# Patient Record
Sex: MALE | Race: BLACK OR AFRICAN AMERICAN | Employment: FULL TIME | URBAN - METROPOLITAN AREA
[De-identification: names, ages, dates, MRNs, and addresses within clinical notes are randomized per-mention and may not be internally consistent; named-entity substitution may affect disease eponyms.]

---

## 2017-01-12 ENCOUNTER — OFFICE VISIT (OUTPATIENT)
Dept: DERMATOLOGY | Age: 52
End: 2017-01-12

## 2017-01-12 DIAGNOSIS — L20.84 INTRINSIC ATOPIC DERMATITIS: Primary | ICD-10-CM

## 2017-01-12 PROCEDURE — 99202 OFFICE O/P NEW SF 15 MIN: CPT | Performed by: DERMATOLOGY

## 2017-01-12 RX ORDER — BETAMETHASONE DIPROPIONATE 0.05 %
OINTMENT (GRAM) TOPICAL
Qty: 45 G | Refills: 2 | Status: SHIPPED | OUTPATIENT
Start: 2017-01-12 | End: 2018-02-23 | Stop reason: SDUPTHER

## 2017-03-02 ENCOUNTER — TELEPHONE (OUTPATIENT)
Dept: CARDIOLOGY CLINIC | Age: 52
End: 2017-03-02

## 2017-08-03 DIAGNOSIS — I10 ESSENTIAL HYPERTENSION: ICD-10-CM

## 2017-08-04 RX ORDER — LOSARTAN POTASSIUM 100 MG/1
100 TABLET ORAL DAILY
Qty: 90 TABLET | Refills: 0 | Status: SHIPPED | OUTPATIENT
Start: 2017-08-04 | End: 2017-08-25 | Stop reason: SDUPTHER

## 2017-08-23 DIAGNOSIS — E79.0 ELEVATED URIC ACID IN BLOOD: ICD-10-CM

## 2017-08-23 RX ORDER — ALLOPURINOL 100 MG/1
TABLET ORAL
Qty: 90 TABLET | Refills: 2 | Status: SHIPPED | OUTPATIENT
Start: 2017-08-23 | End: 2018-09-26 | Stop reason: SDUPTHER

## 2017-08-23 RX ORDER — ASPIRIN 81 MG/1
TABLET, CHEWABLE ORAL
Qty: 90 TABLET | Refills: 3 | Status: SHIPPED | OUTPATIENT
Start: 2017-08-23 | End: 2019-01-25

## 2017-08-25 ENCOUNTER — OFFICE VISIT (OUTPATIENT)
Dept: FAMILY MEDICINE CLINIC | Age: 52
End: 2017-08-25

## 2017-08-25 VITALS
BODY MASS INDEX: 39.99 KG/M2 | OXYGEN SATURATION: 97 % | HEART RATE: 66 BPM | WEIGHT: 270 LBS | SYSTOLIC BLOOD PRESSURE: 120 MMHG | DIASTOLIC BLOOD PRESSURE: 84 MMHG | TEMPERATURE: 96.1 F | HEIGHT: 69 IN | RESPIRATION RATE: 16 BRPM

## 2017-08-25 DIAGNOSIS — Z76.0 ENCOUNTER FOR MEDICATION REFILL: ICD-10-CM

## 2017-08-25 DIAGNOSIS — E78.2 MIXED HYPERLIPIDEMIA: ICD-10-CM

## 2017-08-25 DIAGNOSIS — I10 ESSENTIAL HYPERTENSION: Primary | ICD-10-CM

## 2017-08-25 PROCEDURE — 99213 OFFICE O/P EST LOW 20 MIN: CPT | Performed by: FAMILY MEDICINE

## 2017-08-25 RX ORDER — ASPIRIN 325 MG
325 TABLET ORAL DAILY
COMMUNITY
End: 2017-08-25 | Stop reason: ALTCHOICE

## 2017-08-25 RX ORDER — ATORVASTATIN CALCIUM 10 MG/1
10 TABLET, FILM COATED ORAL DAILY
Qty: 90 TABLET | Refills: 3 | Status: SHIPPED | OUTPATIENT
Start: 2017-08-25 | End: 2018-09-27 | Stop reason: SDUPTHER

## 2017-08-25 RX ORDER — LOSARTAN POTASSIUM 100 MG/1
100 TABLET ORAL DAILY
Qty: 90 TABLET | Refills: 3 | Status: SHIPPED | OUTPATIENT
Start: 2017-08-25 | End: 2018-09-26 | Stop reason: SDUPTHER

## 2017-08-25 RX ORDER — PANTOPRAZOLE SODIUM 40 MG/1
40 TABLET, DELAYED RELEASE ORAL
Qty: 90 TABLET | Refills: 1 | Status: SHIPPED | OUTPATIENT
Start: 2017-08-25 | End: 2018-06-25 | Stop reason: SDUPTHER

## 2018-02-23 ENCOUNTER — OFFICE VISIT (OUTPATIENT)
Dept: FAMILY MEDICINE CLINIC | Age: 53
End: 2018-02-23

## 2018-02-23 VITALS
SYSTOLIC BLOOD PRESSURE: 122 MMHG | HEIGHT: 69 IN | DIASTOLIC BLOOD PRESSURE: 82 MMHG | WEIGHT: 285.4 LBS | BODY MASS INDEX: 42.27 KG/M2 | OXYGEN SATURATION: 97 % | HEART RATE: 66 BPM

## 2018-02-23 DIAGNOSIS — R10.32 LEFT LOWER QUADRANT ABDOMINAL PAIN OF UNKNOWN ETIOLOGY: Primary | ICD-10-CM

## 2018-02-23 DIAGNOSIS — I10 ESSENTIAL HYPERTENSION: Chronic | ICD-10-CM

## 2018-02-23 LAB
BILIRUBIN, POC: NORMAL
BLOOD URINE, POC: NORMAL
CLARITY, POC: NORMAL
COLOR, POC: YELLOW
GLUCOSE URINE, POC: NORMAL
KETONES, POC: NORMAL
LEUKOCYTE EST, POC: NORMAL
NITRITE, POC: NORMAL
PH, POC: 5.5
PROTEIN, POC: NORMAL
SPECIFIC GRAVITY, POC: 1.03
UROBILINOGEN, POC: 0.2

## 2018-02-23 PROCEDURE — G8427 DOCREV CUR MEDS BY ELIG CLIN: HCPCS | Performed by: FAMILY MEDICINE

## 2018-02-23 PROCEDURE — 1036F TOBACCO NON-USER: CPT | Performed by: FAMILY MEDICINE

## 2018-02-23 PROCEDURE — 3017F COLORECTAL CA SCREEN DOC REV: CPT | Performed by: FAMILY MEDICINE

## 2018-02-23 PROCEDURE — G8417 CALC BMI ABV UP PARAM F/U: HCPCS | Performed by: FAMILY MEDICINE

## 2018-02-23 PROCEDURE — G8484 FLU IMMUNIZE NO ADMIN: HCPCS | Performed by: FAMILY MEDICINE

## 2018-02-23 PROCEDURE — 99213 OFFICE O/P EST LOW 20 MIN: CPT | Performed by: FAMILY MEDICINE

## 2018-02-23 PROCEDURE — 81002 URINALYSIS NONAUTO W/O SCOPE: CPT | Performed by: FAMILY MEDICINE

## 2018-02-23 RX ORDER — BETAMETHASONE DIPROPIONATE 0.05 %
OINTMENT (GRAM) TOPICAL
Qty: 45 G | Refills: 2 | Status: SHIPPED | OUTPATIENT
Start: 2018-02-23 | End: 2018-12-14 | Stop reason: SDUPTHER

## 2018-02-23 ASSESSMENT — PATIENT HEALTH QUESTIONNAIRE - PHQ9
SUM OF ALL RESPONSES TO PHQ9 QUESTIONS 1 & 2: 0
SUM OF ALL RESPONSES TO PHQ QUESTIONS 1-9: 0
2. FEELING DOWN, DEPRESSED OR HOPELESS: 0
1. LITTLE INTEREST OR PLEASURE IN DOING THINGS: 0

## 2018-02-23 NOTE — PROGRESS NOTES
Rodrigo Pagan is a 47 y.o. male. HPI:  LLQ abd discomfort since wed, better today , seems resolved  No fever, no nausea or vomiting or diarrhea, further abdominal pain  Colonoscopy in 2015 showed left sided mild diverticulosis  Diverticulosis and diverticulitis discussed with patient  Taking meds  Pressure well controlled  Weight is up he is aware and going to work on it      [id number] Readings from Last 3 Encounters:   02/23/18 285 lb 6.4 oz (129.5 kg)   08/25/17 270 lb (122.5 kg)   11/21/16 275 lb (124.7 kg)     Meds, vitamins and allergies reviewed with Patient    ROS:  Gen:  No fever  HEENT:  No cold symptoms, sore throat. CV:  Denies chest pain or palpitations. Pulm:  Denies shortness of breath, cough. Abd:  Denies abdominal pain, nausea and vomiting today  Skin: no rash    Allergies   Allergen Reactions    Aleve [Naproxen Sodium] Rash    Cherry Swelling    Norvasc [Amlodipine Besylate] Other (See Comments)     Palpitations/chest pressure       Prior to Visit Medications    Medication Sig Taking? Authorizing Provider   pantoprazole (PROTONIX) 40 MG tablet Take 1 tablet by mouth every morning (before breakfast) Yes Ivon Burns MD   losartan (COZAAR) 100 MG tablet Take 1 tablet by mouth daily Yes Ivon Burns MD   atorvastatin (LIPITOR) 10 MG tablet Take 1 tablet by mouth daily Yes Ivon Burns MD   allopurinol (ZYLOPRIM) 100 MG tablet TAKE ONE TABLET BY MOUTH DAILY Yes Ivon Burns MD   aspirin 81 MG chewable tablet CHEW ONE TABLET BY MOUTH DAILY Yes Ivon Burns MD   triamcinolone (KENALOG) 0.1 % ointment Apply to affected areas on the chin twice daily for up to 2 weeks or until improved. Yes Flex Ty MD   betamethasone dipropionate (DIPROLENE) 0.05 % ointment Apply to affected areas on the arms, chest and thighs twice daily for up to 2 weeks or until improved.  Yes Flex Ty MD   sildenafil (VIAGRA) 50 MG tablet Take 50 mg by mouth as needed  Yes Historical Provider, MD

## 2018-04-03 ENCOUNTER — TELEPHONE (OUTPATIENT)
Dept: FAMILY MEDICINE CLINIC | Age: 53
End: 2018-04-03

## 2018-06-25 RX ORDER — PANTOPRAZOLE SODIUM 40 MG/1
40 TABLET, DELAYED RELEASE ORAL
Qty: 90 TABLET | Refills: 1 | Status: SHIPPED | OUTPATIENT
Start: 2018-06-25 | End: 2019-01-25 | Stop reason: SDUPTHER

## 2018-07-23 ENCOUNTER — OFFICE VISIT (OUTPATIENT)
Dept: FAMILY MEDICINE CLINIC | Age: 53
End: 2018-07-23

## 2018-07-23 VITALS
RESPIRATION RATE: 16 BRPM | BODY MASS INDEX: 39.55 KG/M2 | HEART RATE: 72 BPM | WEIGHT: 267 LBS | HEIGHT: 69 IN | OXYGEN SATURATION: 98 % | SYSTOLIC BLOOD PRESSURE: 130 MMHG | DIASTOLIC BLOOD PRESSURE: 80 MMHG

## 2018-07-23 DIAGNOSIS — I10 ESSENTIAL HYPERTENSION: ICD-10-CM

## 2018-07-23 DIAGNOSIS — M17.0 PRIMARY OSTEOARTHRITIS OF BOTH KNEES: Primary | ICD-10-CM

## 2018-07-23 PROCEDURE — 3017F COLORECTAL CA SCREEN DOC REV: CPT | Performed by: FAMILY MEDICINE

## 2018-07-23 PROCEDURE — 1036F TOBACCO NON-USER: CPT | Performed by: FAMILY MEDICINE

## 2018-07-23 PROCEDURE — G8427 DOCREV CUR MEDS BY ELIG CLIN: HCPCS | Performed by: FAMILY MEDICINE

## 2018-07-23 PROCEDURE — 99213 OFFICE O/P EST LOW 20 MIN: CPT | Performed by: FAMILY MEDICINE

## 2018-07-23 PROCEDURE — G8417 CALC BMI ABV UP PARAM F/U: HCPCS | Performed by: FAMILY MEDICINE

## 2018-07-23 NOTE — PROGRESS NOTES
Shama Oconnell is a 47 y.o. male. HPI:  At work Wednesday last week 7/18/18 - hose let loose and was sprayed in face by a chemical /cancer med ? Went to [facility] - flushed eye /decontamninated   Has eye appt today at   Mild HA today  Asking for renewal of his disability placard/letter  Working on weight loss    Wt Readings from Last 3 Encounters:   07/23/18 267 lb (121.1 kg)   02/23/18 285 lb 6.4 oz (129.5 kg)   08/25/17 270 lb (122.5 kg)     Meds, vitamins and allergies reviewed with Patient    ROS:  Gen:  No fever  HEENT:  No cold symptoms, sore throat. CV:  Denies chest pain or palpitations. Pulm:  Denies shortness of breath, cough. Abd:  Denies abdominal pain, nausea and vomiting. Skin: no rash    Allergies   Allergen Reactions    Aleve [Naproxen Sodium] Rash    Cherry Swelling    Norvasc [Amlodipine Besylate] Other (See Comments)     Palpitations/chest pressure       Prior to Visit Medications    Medication Sig Taking? Authorizing Provider   pantoprazole (PROTONIX) 40 MG tablet Take 1 tablet by mouth every morning (before breakfast) Yes Kentrell Cazares MD   triamcinolone (KENALOG) 0.1 % ointment Apply to affected areas on the chin twice daily for up to 2 weeks or until improved. Yes Kentrell Cazares MD   betamethasone dipropionate (DIPROLENE) 0.05 % ointment Apply to affected areas on the arms, chest and thighs twice daily for up to 2 weeks or until improved.  Yes Kentrell Cazares MD   aspirin 81 MG chewable tablet CHEW ONE TABLET BY MOUTH DAILY Yes Kentrell Cazares MD   losartan (COZAAR) 100 MG tablet Take 1 tablet by mouth daily Yes Kentrell Cazares MD   atorvastatin (LIPITOR) 10 MG tablet Take 1 tablet by mouth daily Yes Kentrell Cazares MD   allopurinol (ZYLOPRIM) 100 MG tablet TAKE ONE TABLET BY MOUTH DAILY Yes Kentrell Cazares MD   aspirin 81 MG chewable tablet CHEW ONE TABLET BY MOUTH DAILY Yes Kentrell Cazares MD   sildenafil (VIAGRA) 50 MG tablet Take 50 mg by mouth as needed  Yes Historical Provider, MD

## 2018-08-03 ENCOUNTER — TELEPHONE (OUTPATIENT)
Dept: FAMILY MEDICINE CLINIC | Age: 53
End: 2018-08-03

## 2018-08-30 ENCOUNTER — HOSPITAL ENCOUNTER (OUTPATIENT)
Dept: ULTRASOUND IMAGING | Age: 53
Discharge: HOME | End: 2018-08-30
Attending: INTERNAL MEDICINE | Admitting: INTERNAL MEDICINE

## 2018-08-30 DIAGNOSIS — N18.30 CHRONIC KIDNEY DISEASE, STAGE III (MODERATE) (HCC): ICD-10-CM

## 2018-08-30 DIAGNOSIS — N18.30 CKD (CHRONIC KIDNEY DISEASE), STAGE III (HCC): ICD-10-CM

## 2018-08-30 DIAGNOSIS — I10 ESSENTIAL HYPERTENSION: ICD-10-CM

## 2018-08-30 LAB
ANION GAP SERPL CALCULATED.3IONS-SCNC: 12 MMOL/L (ref 3–16)
BUN BLDV-MCNC: 12 MG/DL (ref 7–20)
CALCIUM SERPL-MCNC: 8.6 MG/DL (ref 8.3–10.6)
CHLORIDE BLD-SCNC: 103 MMOL/L (ref 99–110)
CO2: 28 MMOL/L (ref 21–32)
CREAT SERPL-MCNC: 1.7 MG/DL (ref 0.9–1.3)
CREATININE URINE: 288.3 MG/DL (ref 39–259)
GFR AFRICAN AMERICAN: 51
GFR NON-AFRICAN AMERICAN: 43
GLUCOSE BLD-MCNC: 82 MG/DL (ref 70–99)
POTASSIUM SERPL-SCNC: 4.2 MMOL/L (ref 3.5–5.1)
PROTEIN PROTEIN: 18 MG/DL
SODIUM BLD-SCNC: 143 MMOL/L (ref 136–145)

## 2018-09-26 DIAGNOSIS — I10 ESSENTIAL HYPERTENSION: ICD-10-CM

## 2018-09-26 DIAGNOSIS — E79.0 ELEVATED URIC ACID IN BLOOD: ICD-10-CM

## 2018-09-26 NOTE — TELEPHONE ENCOUNTER
Medication and Quantity requested: Atorvastatin 10 MG Qty 90     Last Visit  7/23/18    Pharmacy and phone number updated in EPIC:  yes

## 2018-09-27 RX ORDER — ATORVASTATIN CALCIUM 10 MG/1
10 TABLET, FILM COATED ORAL DAILY
Qty: 90 TABLET | Refills: 3 | Status: SHIPPED | OUTPATIENT
Start: 2018-09-27 | End: 2020-02-21 | Stop reason: SDUPTHER

## 2018-09-28 RX ORDER — ALLOPURINOL 100 MG/1
TABLET ORAL
Qty: 90 TABLET | Refills: 2 | Status: SHIPPED | OUTPATIENT
Start: 2018-09-28 | End: 2019-01-28 | Stop reason: SDUPTHER

## 2018-09-28 RX ORDER — LOSARTAN POTASSIUM 100 MG/1
TABLET ORAL
Qty: 90 TABLET | Refills: 2 | Status: SHIPPED | OUTPATIENT
Start: 2018-09-28 | End: 2019-08-19 | Stop reason: SDUPTHER

## 2018-11-06 RX ORDER — ASPIRIN 81 MG/1
TABLET, CHEWABLE ORAL
Qty: 90 TABLET | Refills: 1 | Status: SHIPPED | OUTPATIENT
Start: 2018-11-06 | End: 2019-08-19 | Stop reason: SDUPTHER

## 2018-12-14 RX ORDER — BETAMETHASONE DIPROPIONATE 0.05 %
OINTMENT (GRAM) TOPICAL
Qty: 45 G | Refills: 2 | Status: ON HOLD | OUTPATIENT
Start: 2018-12-14 | End: 2022-03-01

## 2019-01-24 ENCOUNTER — TELEPHONE (OUTPATIENT)
Dept: FAMILY MEDICINE CLINIC | Age: 54
End: 2019-01-24

## 2019-01-24 ENCOUNTER — NURSE TRIAGE (OUTPATIENT)
Dept: OTHER | Facility: CLINIC | Age: 54
End: 2019-01-24

## 2019-01-25 ENCOUNTER — OFFICE VISIT (OUTPATIENT)
Dept: FAMILY MEDICINE CLINIC | Age: 54
End: 2019-01-25
Payer: COMMERCIAL

## 2019-01-25 VITALS
BODY MASS INDEX: 41.03 KG/M2 | DIASTOLIC BLOOD PRESSURE: 60 MMHG | HEIGHT: 69 IN | SYSTOLIC BLOOD PRESSURE: 110 MMHG | OXYGEN SATURATION: 93 % | HEART RATE: 70 BPM | TEMPERATURE: 97.5 F | WEIGHT: 277 LBS

## 2019-01-25 DIAGNOSIS — Z11.4 ENCOUNTER FOR SCREENING FOR HIV: ICD-10-CM

## 2019-01-25 DIAGNOSIS — E78.2 MIXED HYPERLIPIDEMIA: ICD-10-CM

## 2019-01-25 DIAGNOSIS — E79.0 ELEVATED URIC ACID IN BLOOD: ICD-10-CM

## 2019-01-25 DIAGNOSIS — Z23 FLU VACCINE NEED: ICD-10-CM

## 2019-01-25 DIAGNOSIS — R07.89 CHEST PAIN, ATYPICAL: Primary | Chronic | ICD-10-CM

## 2019-01-25 LAB
CHOLESTEROL, TOTAL: 157 MG/DL (ref 0–199)
HDLC SERPL-MCNC: 48 MG/DL (ref 40–60)
LDL CHOLESTEROL CALCULATED: 97 MG/DL
TRIGL SERPL-MCNC: 62 MG/DL (ref 0–150)
URIC ACID, SERUM: 7.3 MG/DL (ref 3.5–7.2)
VLDLC SERPL CALC-MCNC: 12 MG/DL

## 2019-01-25 PROCEDURE — 90471 IMMUNIZATION ADMIN: CPT | Performed by: FAMILY MEDICINE

## 2019-01-25 PROCEDURE — 90682 RIV4 VACC RECOMBINANT DNA IM: CPT | Performed by: FAMILY MEDICINE

## 2019-01-25 PROCEDURE — 99214 OFFICE O/P EST MOD 30 MIN: CPT | Performed by: FAMILY MEDICINE

## 2019-01-25 RX ORDER — PANTOPRAZOLE SODIUM 40 MG/1
40 TABLET, DELAYED RELEASE ORAL
Qty: 90 TABLET | Refills: 3 | Status: SHIPPED | OUTPATIENT
Start: 2019-01-25 | End: 2020-02-06

## 2019-01-26 LAB
HIV AG/AB: NORMAL
HIV ANTIGEN: NORMAL
HIV-1 ANTIBODY: NORMAL
HIV-2 AB: NORMAL

## 2019-01-28 DIAGNOSIS — E79.0 ELEVATED URIC ACID IN BLOOD: ICD-10-CM

## 2019-01-28 RX ORDER — ALLOPURINOL 100 MG/1
200 TABLET ORAL DAILY
Qty: 180 TABLET | Refills: 3 | Status: SHIPPED | OUTPATIENT
Start: 2019-01-28 | End: 2020-02-21 | Stop reason: SDUPTHER

## 2019-02-04 ENCOUNTER — TELEPHONE (OUTPATIENT)
Dept: FAMILY MEDICINE CLINIC | Age: 54
End: 2019-02-04

## 2019-10-31 ENCOUNTER — TELEPHONE (OUTPATIENT)
Dept: FAMILY MEDICINE CLINIC | Age: 54
End: 2019-10-31

## 2020-02-21 ENCOUNTER — HOSPITAL ENCOUNTER (OUTPATIENT)
Dept: GENERAL RADIOLOGY | Age: 55
Discharge: HOME | End: 2020-02-21
Payer: COMMERCIAL

## 2020-02-21 ENCOUNTER — OFFICE VISIT (OUTPATIENT)
Dept: FAMILY MEDICINE CLINIC | Age: 55
End: 2020-02-21
Payer: COMMERCIAL

## 2020-02-21 ENCOUNTER — HOSPITAL ENCOUNTER (OUTPATIENT)
Age: 55
Discharge: HOME | End: 2020-02-21
Payer: COMMERCIAL

## 2020-02-21 VITALS
WEIGHT: 267 LBS | SYSTOLIC BLOOD PRESSURE: 118 MMHG | BODY MASS INDEX: 40.47 KG/M2 | OXYGEN SATURATION: 98 % | DIASTOLIC BLOOD PRESSURE: 76 MMHG | HEIGHT: 68 IN | HEART RATE: 69 BPM

## 2020-02-21 DIAGNOSIS — E78.2 MIXED HYPERLIPIDEMIA: ICD-10-CM

## 2020-02-21 DIAGNOSIS — I10 ESSENTIAL HYPERTENSION: ICD-10-CM

## 2020-02-21 DIAGNOSIS — E79.0 ELEVATED URIC ACID IN BLOOD: ICD-10-CM

## 2020-02-21 LAB
A/G RATIO: 1.7 (ref 1.1–2.2)
ALBUMIN SERPL-MCNC: 4 G/DL (ref 3.4–5)
ALP BLD-CCNC: 56 U/L (ref 40–129)
ALT SERPL-CCNC: 19 U/L (ref 10–40)
ANION GAP SERPL CALCULATED.3IONS-SCNC: 12 MMOL/L (ref 3–16)
AST SERPL-CCNC: 25 U/L (ref 15–37)
BACTERIA URINE, POC: NORMAL
BASOPHILS ABSOLUTE: 0 K/UL (ref 0–0.2)
BASOPHILS RELATIVE PERCENT: 0.4 %
BILIRUB SERPL-MCNC: 1.5 MG/DL (ref 0–1)
BILIRUBIN URINE: 0 MG/DL
BLOOD, URINE: NORMAL
BUN BLDV-MCNC: 14 MG/DL (ref 7–20)
CALCIUM SERPL-MCNC: 9.3 MG/DL (ref 8.3–10.6)
CASTS URINE, POC: NORMAL
CHLORIDE BLD-SCNC: 103 MMOL/L (ref 99–110)
CHOLESTEROL, TOTAL: 160 MG/DL (ref 0–199)
CLARITY: CLEAR
CO2: 26 MMOL/L (ref 21–32)
COLOR: YELLOW
CREAT SERPL-MCNC: 1.5 MG/DL (ref 0.9–1.3)
CRYSTALS URINE, POC: NORMAL
EOSINOPHILS ABSOLUTE: 0.1 K/UL (ref 0–0.6)
EOSINOPHILS RELATIVE PERCENT: 1.5 %
EPI CELLS URINE, POC: NORMAL
GFR AFRICAN AMERICAN: 59
GFR NON-AFRICAN AMERICAN: 49
GLOBULIN: 2.4 G/DL
GLUCOSE BLD-MCNC: 86 MG/DL (ref 70–99)
GLUCOSE URINE: NORMAL
HCT VFR BLD CALC: 41.7 % (ref 40.5–52.5)
HDLC SERPL-MCNC: 54 MG/DL (ref 40–60)
HEMOGLOBIN: 14.2 G/DL (ref 13.5–17.5)
KETONES, URINE: NEGATIVE
LDL CHOLESTEROL CALCULATED: 92 MG/DL
LEUKOCYTE EST, POC: NORMAL
LYMPHOCYTES ABSOLUTE: 3.4 K/UL (ref 1–5.1)
LYMPHOCYTES RELATIVE PERCENT: 44.3 %
MCH RBC QN AUTO: 32.7 PG (ref 26–34)
MCHC RBC AUTO-ENTMCNC: 34.2 G/DL (ref 31–36)
MCV RBC AUTO: 95.6 FL (ref 80–100)
MONOCYTES ABSOLUTE: 0.7 K/UL (ref 0–1.3)
MONOCYTES RELATIVE PERCENT: 8.4 %
NEUTROPHILS ABSOLUTE: 3.5 K/UL (ref 1.7–7.7)
NEUTROPHILS RELATIVE PERCENT: 45.4 %
NITRITE, URINE: NEGATIVE
PDW BLD-RTO: 14.3 % (ref 12.4–15.4)
PH UA: 6 (ref 4.5–8)
PLATELET # BLD: 194 K/UL (ref 135–450)
PMV BLD AUTO: 9 FL (ref 5–10.5)
POTASSIUM SERPL-SCNC: 4.4 MMOL/L (ref 3.5–5.1)
PROTEIN UA: NORMAL
RBC # BLD: 4.36 M/UL (ref 4.2–5.9)
RBC URINE, POC: NORMAL
SODIUM BLD-SCNC: 141 MMOL/L (ref 136–145)
SPECIFIC GRAVITY UA: 1.02 (ref 1–1.03)
TOTAL PROTEIN: 6.4 G/DL (ref 6.4–8.2)
TRIGL SERPL-MCNC: 71 MG/DL (ref 0–150)
TSH REFLEX: 1.67 UIU/ML (ref 0.27–4.2)
URIC ACID, SERUM: 5.7 MG/DL (ref 3.5–7.2)
UROBILINOGEN, URINE: NORMAL
VLDLC SERPL CALC-MCNC: 14 MG/DL
WBC # BLD: 7.8 K/UL (ref 4–11)
WBC URINE, POC: NORMAL
YEAST URINE, POC: NORMAL

## 2020-02-21 PROCEDURE — 90471 IMMUNIZATION ADMIN: CPT | Performed by: FAMILY MEDICINE

## 2020-02-21 PROCEDURE — 90686 IIV4 VACC NO PRSV 0.5 ML IM: CPT | Performed by: FAMILY MEDICINE

## 2020-02-21 PROCEDURE — 73502 X-RAY EXAM HIP UNI 2-3 VIEWS: CPT

## 2020-02-21 PROCEDURE — 99396 PREV VISIT EST AGE 40-64: CPT | Performed by: FAMILY MEDICINE

## 2020-02-21 PROCEDURE — 81000 URINALYSIS NONAUTO W/SCOPE: CPT | Performed by: FAMILY MEDICINE

## 2020-02-21 RX ORDER — ATORVASTATIN CALCIUM 10 MG/1
10 TABLET, FILM COATED ORAL NIGHTLY
Qty: 90 TABLET | Refills: 3 | Status: SHIPPED | OUTPATIENT
Start: 2020-02-21 | End: 2021-03-04

## 2020-02-21 RX ORDER — LOSARTAN POTASSIUM 100 MG/1
100 TABLET ORAL DAILY
Qty: 90 TABLET | Refills: 3 | Status: SHIPPED | OUTPATIENT
Start: 2020-02-21 | End: 2020-05-21

## 2020-02-21 RX ORDER — ALLOPURINOL 100 MG/1
200 TABLET ORAL DAILY
Qty: 180 TABLET | Refills: 3 | Status: SHIPPED | OUTPATIENT
Start: 2020-02-21 | End: 2021-03-04

## 2020-02-21 ASSESSMENT — PATIENT HEALTH QUESTIONNAIRE - PHQ9
SUM OF ALL RESPONSES TO PHQ QUESTIONS 1-9: 0
SUM OF ALL RESPONSES TO PHQ QUESTIONS 1-9: 0
1. LITTLE INTEREST OR PLEASURE IN DOING THINGS: 0
SUM OF ALL RESPONSES TO PHQ9 QUESTIONS 1 & 2: 0
2. FEELING DOWN, DEPRESSED OR HOPELESS: 0

## 2020-02-21 NOTE — PROGRESS NOTES
Vaccine Information Sheet, \"Influenza - Inactivated\"  given to Brandi Jenkins, or parent/legal guardian of  Brandi Jenkins and verbalized understanding. Patient responses:    Have you ever had a reaction to a flu vaccine? No  Do you have any current illness? No  Have you ever had Guillian Burket Syndrome? No  Do you have a serious allergy to any of the follow: Neomycin, Polymyxin, Thimerosal, eggs or egg products? No    Flu vaccine given per order. Please see immunization tab. Risks and benefits explained. Current VIS given.       Immunizations Administered     Name Date Dose Route    Influenza, Quadv, IM, PF (6 mo and older Fluzone, Flulaval, Fluarix, and 3 yrs and older Afluria) 2/21/2020 0.5 mL Intramuscular    Site: Deltoid- Right    Lot: [id number]    [address]: 89034-403-23
(ZYLOPRIM) 100 MG tablet; Take 2 tablets by mouth daily  Dispense: 180 tablet; Refill: 3  - URIC ACID; Future    6. Mixed hyperlipidemia  Stable, continue Lipitor  - atorvastatin (LIPITOR) 10 MG tablet; Take 1 tablet by mouth nightly  Dispense: 90 tablet; Refill: 3  - CBC Auto Differential; Future  - Comprehensive Metabolic Panel; Future  - Lipid Panel; Future    7. BMI 39.0-39.9,adult  Healthy diet, regular exercise  - Hemoglobin A1C; Future  - TSH with Reflex; Future    8. Encounter for medication refill  reFill meds    9.  Flu vaccine need  Vaccine today  - INFLUENZA, QUADV, 3 YRS AND OLDER, IM PF, PREFILL SYR OR SDV, 0.5ML (AFLURIA QUADV, PF)

## 2020-02-21 NOTE — PATIENT INSTRUCTIONS
[facility]  Multiple locations  [place] office      [phone]  Fax  [phone]    [facility] office  Phone  [phone]  Fax       [phone]    [facility]  Phone  [phone]  Fax       [phone]    [employer]  [phone]  Fax      [phone]

## 2020-02-22 LAB
ESTIMATED AVERAGE GLUCOSE: 96.8 MG/DL
HBA1C MFR BLD: 5 %

## 2020-05-21 RX ORDER — LOSARTAN POTASSIUM 100 MG/1
TABLET ORAL
Qty: 90 TABLET | Refills: 1 | Status: SHIPPED | OUTPATIENT
Start: 2020-05-21 | End: 2021-03-04

## 2020-05-21 NOTE — TELEPHONE ENCOUNTER
Medication:   Requested Prescriptions     Pending Prescriptions Disp Refills    losartan (COZAAR) 100 MG tablet [Pharmacy Med Name: LOSARTAN POTASSIUM 100 MG TAB] 30 tablet 2     Sig: TAKE ONE TABLET BY MOUTH DAILY       Last Filled:  2/21/20 #90, 3 - per pharmacist no refill on file    Patient Phone Number: [phone] (home) [phone] (work)    Last appt: 2/21/2020   Next appt: Visit date not found    Lab Results   Component Value Date     02/21/2020    K 4.4 02/21/2020     02/21/2020    CO2 26 02/21/2020    BUN 14 02/21/2020    CREATININE 1.5 (H) 02/21/2020    GLUCOSE 86 02/21/2020    CALCIUM 9.3 02/21/2020    PROT 6.4 02/21/2020    LABALBU 4.0 02/21/2020    BILITOT 1.5 (H) 02/21/2020    ALKPHOS 56 02/21/2020    AST 25 02/21/2020    ALT 19 02/21/2020    LABGLOM 49 (A) 02/21/2020    GFRAA 59 (A) 02/21/2020    AGRATIO 1.7 02/21/2020    GLOB 2.4 02/21/2020

## 2020-07-30 RX ORDER — ASPIRIN 81 MG/1
TABLET, CHEWABLE ORAL
Qty: 30 TABLET | Refills: 0 | Status: SHIPPED | OUTPATIENT
Start: 2020-07-30 | End: 2020-08-31

## 2020-08-31 RX ORDER — PANTOPRAZOLE SODIUM 40 MG/1
TABLET, DELAYED RELEASE ORAL
Qty: 30 TABLET | Refills: 4 | Status: SHIPPED | OUTPATIENT
Start: 2020-08-31 | End: 2021-03-04

## 2020-08-31 RX ORDER — ASPIRIN 81 MG/1
TABLET, CHEWABLE ORAL
Qty: 30 TABLET | Refills: 5 | Status: SHIPPED | OUTPATIENT
Start: 2020-08-31 | End: 2021-04-01

## 2020-08-31 NOTE — TELEPHONE ENCOUNTER
Medication:   Requested Prescriptions     Pending Prescriptions Disp Refills    pantoprazole (PROTONIX) 40 MG tablet [Pharmacy Med Name: PANTOPRAZOLE SOD DR 40 MG TAB] 30 tablet 4     Sig: TAKE ONE TABLET BY MOUTH EVERY MORNING BEFORE BREAKFAST        Last Filled:  02/06/2020  #30 5 rf     Patient Phone Number: [phone] (home) [phone] (work)    Last appt: 2/21/2020   Next appt: Visit date not found    Last OARRS: No flowsheet data found.

## 2020-08-31 NOTE — TELEPHONE ENCOUNTER
Medication:   Requested Prescriptions     Pending Prescriptions Disp Refills    aspirin 81 MG chewable tablet [Pharmacy Med Name: ASPIRIN 81 MG CHEWABLE TABLET] 30 tablet 0     Sig: CHEW ONE TABLET BY MOUTH DAILY        Last Filled:  07/30/2020 #30 0 rf     Patient Phone Number: [phone] (home) [phone] (work)    Last appt: 02/21/2020  Next appt: Visit date not found    Last OARRS: No flowsheet data found.

## 2020-09-16 ENCOUNTER — TELEPHONE (OUTPATIENT)
Dept: FAMILY MEDICINE CLINIC | Age: 55
End: 2020-09-16

## 2020-09-16 NOTE — TELEPHONE ENCOUNTER
Patient called to schedule appt. for medication check, and check on symptoms listed below  Patient is having a lot of drainage, cough-slight productive-clear in color  No fever, no chills  Patient has not been in contact with anyone having Covid  Patient is also due for a medication check  Contact patient to sched. appt

## 2020-09-17 ENCOUNTER — VIRTUAL VISIT (OUTPATIENT)
Dept: FAMILY MEDICINE CLINIC | Age: 55
End: 2020-09-17
Payer: COMMERCIAL

## 2020-09-17 ENCOUNTER — TELEPHONE (OUTPATIENT)
Dept: FAMILY MEDICINE CLINIC | Age: 55
End: 2020-09-17

## 2020-09-17 PROCEDURE — 99213 OFFICE O/P EST LOW 20 MIN: CPT | Performed by: FAMILY MEDICINE

## 2020-09-17 RX ORDER — LORATADINE 10 MG/1
10 TABLET ORAL DAILY
Qty: 30 TABLET | Refills: 2 | Status: SHIPPED | OUTPATIENT
Start: 2020-09-17

## 2020-09-17 RX ORDER — FLUTICASONE PROPIONATE 50 MCG
1 SPRAY, SUSPENSION (ML) NASAL DAILY
Qty: 2 BOTTLE | Refills: 2 | Status: ON HOLD | OUTPATIENT
Start: 2020-09-17 | End: 2022-03-01

## 2020-09-17 NOTE — PROGRESS NOTES
Osorio Bardales is a 49 y.o. male. HPI:  Due to the current coronavirus pandemic, this telephone/video visit was insisted, with patient's consent, to reduce the patient's risk of exposure to COVID-19 and provide continuity of care for an established patient. The patient was at home while the provider was either at home or at the clinic. Services were provided through a synchronous discussion over the telephone and/or video chat to substitute for in person clinic visit, and coded as such. Video visit with this patient  Started last Wednesday with URI sxs associated with drainage and hoarseness  Minimal cough may be some body aches  Has had no covid  exposure    Improved today  Breathing okay, no fever, minimal sinus congestion    Meds, vitamins and allergies reviewed with pt  Doing well on all medication  Last labs 2/2020   all acceptable, monitoring creatinine of 1.5    Wt Readings from Last 3 Encounters:   02/21/20 267 lb (121.1 kg)   01/25/19 277 lb (125.6 kg)   10/22/18 276 lb 12.8 oz (125.6 kg)       REVIEW OF SYSTEMS:   CONSTITUTIONAL: See history of present illness,   Weight noted   HEENT: No new vision difficulties or ringing in the ears. RESPIRATORY: No new SOB, PND, orthopnea or cough. CARDIOVASCULAR: no CP, palpitations or SOB with exertion  GI: No nausea, vomiting, diarrhea, constipation, abdominal pain or changes in bowel habits. : No urinary frequency, urgency, incontinence hematuria or dysuria. SKIN: No cyanosis or skin lesions. MUSCULOSKELETAL: No new muscle or joint pain. NEUROLOGICAL: No syncope or TIA-like symptoms. PSYCHIATRIC: No anxiety, insomnia or depression     Allergies   Allergen Reactions    Aleve [Naproxen Sodium] Rash    Cherry Swelling    Norvasc [Amlodipine Besylate] Other (See Comments)     Palpitations/chest pressure       Prior to Visit Medications    Medication Sig Taking?  Authorizing Provider   fluticasone (FLONASE) 50 MCG/ACT nasal spray 1 spray by Each Nostril route daily Yes Mary Aguirre MD   loratadine (CLARITIN) 10 MG tablet Take 1 tablet by mouth daily Yes Mary Aguirre MD   pantoprazole (PROTONIX) 40 MG tablet TAKE ONE TABLET BY MOUTH EVERY MORNING BEFORE BREAKFAST Yes Mary Aguirre MD   aspirin 81 MG chewable tablet CHEW ONE TABLET BY MOUTH DAILY Yes Mary Aguirre MD   losartan (COZAAR) 100 MG tablet TAKE ONE TABLET BY MOUTH DAILY Yes Mary Aguirre MD   allopurinol (ZYLOPRIM) 100 MG tablet Take 2 tablets by mouth daily Yes Mary Aguirre MD   atorvastatin (LIPITOR) 10 MG tablet Take 1 tablet by mouth nightly Yes Mary Aguirre MD   mometasone (ASMANEX 30 METERED DOSES) 110 MCG/INH AEPB Inhale 2 puffs into the lungs 2 times daily Yes Mary Aguirre MD   betamethasone dipropionate (DIPROLENE) 0.05 % ointment Apply to affected areas on the arms, chest and thighs twice daily for up to 2 weeks or until improved. Yes Mary Aguirre MD   triamcinolone (KENALOG) 0.1 % ointment Apply to affected areas on the chin twice daily for up to 2 weeks or until improved. Yes Mary Aguirre MD   sildenafil (VIAGRA) 50 MG tablet Take 50 mg by mouth as needed  Yes Historical Provider, MD   acetaminophen (TYLENOL) 325 MG tablet Take 650 mg by mouth every 6 hours as needed for Pain (last dose 8am) Yes Historical Provider, MD   hydrocortisone 2.5 % cream Apply topically 2 times daily.  Yes Mary Aguirre MD       Past Medical History:   Diagnosis Date    Arthritis     Bilateral renal cysts 6/3/2013    US   6/3/13     DJD (degenerative joint disease) 8/23/2011    Elevated uric acid in blood 5/22/2013    GERD (gastroesophageal reflux disease)     Gout 2013    HTN (hypertension) 5/22/2013    Hypertension     Movement disorder     gout; left knee along w/generallized arthritis    Prostate cancer ([facility] [address] 2/24/16    Renal insufficiency 6/1/2013    TIA (transient ischemic attack)        Social History     Tobacco Use    Smoking status: Never Smoker    Smokeless tobacco:

## 2020-12-01 ENCOUNTER — OFFICE VISIT (OUTPATIENT)
Dept: PRIMARY CARE CLINIC | Age: 55
End: 2020-12-01
Payer: COMMERCIAL

## 2020-12-01 PROCEDURE — 99211 OFF/OP EST MAY X REQ PHY/QHP: CPT | Performed by: NURSE PRACTITIONER

## 2020-12-02 ENCOUNTER — TELEPHONE (OUTPATIENT)
Dept: FAMILY MEDICINE CLINIC | Age: 55
End: 2020-12-02

## 2020-12-02 NOTE — TELEPHONE ENCOUNTER
As long as he is doing okay, no need to see him  Push fluids, rest, alternate Tylenol with Advil for any body aches or fever  He should quarantine so not to infect anyone else until he knows his test is positive

## 2020-12-02 NOTE — TELEPHONE ENCOUNTER
Patient had a Covid test done yesterday  Patient has chills/off and on  Fever has gone, achy has gone  Patient's coworker tested positive, last week  Patient is asking if Dr. Cheryl Dean needs to see him?   Contact patient

## 2020-12-04 ENCOUNTER — TELEPHONE (OUTPATIENT)
Dept: FAMILY MEDICINE CLINIC | Age: 55
End: 2020-12-04

## 2020-12-04 LAB — SARS-COV-2, NAA: DETECTED

## 2020-12-04 NOTE — TELEPHONE ENCOUNTER
Covid test is positive    Patient should isolate so not to infect anyone else    Follow CDC guidelines  Rest, fluids, alternate Tylenol with Advil for any fever or aches    Follow-up as needed

## 2020-12-07 ENCOUNTER — NURSE TRIAGE (OUTPATIENT)
Dept: OTHER | Facility: CLINIC | Age: 55
End: 2020-12-07

## 2020-12-07 ENCOUNTER — CARE COORDINATION (OUTPATIENT)
Dept: CARE COORDINATION | Age: 55
End: 2020-12-07

## 2020-12-07 ENCOUNTER — VIRTUAL VISIT (OUTPATIENT)
Dept: FAMILY MEDICINE CLINIC | Age: 55
End: 2020-12-07
Payer: COMMERCIAL

## 2020-12-07 PROCEDURE — 99213 OFFICE O/P EST LOW 20 MIN: CPT | Performed by: FAMILY MEDICINE

## 2020-12-07 RX ORDER — METHYLPREDNISOLONE 4 MG/1
TABLET ORAL
Qty: 1 KIT | Refills: 0 | Status: SHIPPED | OUTPATIENT
Start: 2020-12-07 | End: 2020-12-13

## 2020-12-07 NOTE — PROGRESS NOTES
Abigail Treviño is a 48 y.o. male. HPI:  Due to the current coronavirus pandemic, this telephone/video visit was insisted, with patient's consent, to reduce the patient's risk of exposure to COVID-19 and provide continuity of care for an established patient. The patient was at home while the provider was either at home or at the clinic. Services were provided through a synchronous discussion over the telephone and/or video chat to substitute for in person clinic visit, and coded as such. Vv , + covid has questions     + covid TEST 12/1/2020    Over the weekend felt like he got more short of breath with more cough and body aches  Fever off and on  Isolating at home, trying to push fluids and eat healthy  Rest does not feel that well  Meds, vitamins and allergies reviewed with pt    Wt Readings from Last 3 Encounters:   02/21/20 267 lb (121.1 kg)   01/25/19 277 lb (125.6 kg)   10/22/18 276 lb 12.8 oz (125.6 kg)       REVIEW OF SYSTEMS:   CONSTITUTIONAL: See history of present illness,   Weight noted   HEENT: No new vision difficulties or ringing in the ears. RESPIRATORY: No new SOB, PND, orthopnea or cough. CARDIOVASCULAR: no CP, palpitations or SOB with exertion  GI: No nausea, vomiting, diarrhea, constipation, abdominal pain or changes in bowel habits. : No urinary frequency, urgency, incontinence hematuria or dysuria. SKIN: No cyanosis or skin lesions. MUSCULOSKELETAL: No new muscle or joint pain. NEUROLOGICAL: No syncope or TIA-like symptoms. PSYCHIATRIC: No anxiety, insomnia or depression     Allergies   Allergen Reactions    Aleve [Naproxen Sodium] Rash    Cherry Swelling    Norvasc [Amlodipine Besylate] Other (See Comments)     Palpitations/chest pressure       Prior to Visit Medications    Medication Sig Taking? Authorizing Provider   methylPREDNISolone (MEDROL DOSEPACK) 4 MG tablet Take by mouth.  Yes Marc Alonso MD   fluticasone (FLONASE) 50 MCG/ACT nasal spray 1 spray by Each Nostril route daily Yes Ajit Martinez MD   loratadine (CLARITIN) 10 MG tablet Take 1 tablet by mouth daily Yes Ajit Martinez MD   pantoprazole (PROTONIX) 40 MG tablet TAKE ONE TABLET BY MOUTH EVERY MORNING BEFORE BREAKFAST Yes Ajit Martinez MD   aspirin 81 MG chewable tablet CHEW ONE TABLET BY MOUTH DAILY Yes Ajit Martinez MD   losartan (COZAAR) 100 MG tablet TAKE ONE TABLET BY MOUTH DAILY Yes Ajit Martinez MD   allopurinol (ZYLOPRIM) 100 MG tablet Take 2 tablets by mouth daily Yes Ajit Martinez MD   atorvastatin (LIPITOR) 10 MG tablet Take 1 tablet by mouth nightly Yes Ajit Martinez MD   mometasone (ASMANEX 30 METERED DOSES) 110 MCG/INH AEPB Inhale 2 puffs into the lungs 2 times daily Yes Ajit Martinez MD   betamethasone dipropionate (DIPROLENE) 0.05 % ointment Apply to affected areas on the arms, chest and thighs twice daily for up to 2 weeks or until improved. Yes Ajit Martinez MD   triamcinolone (KENALOG) 0.1 % ointment Apply to affected areas on the chin twice daily for up to 2 weeks or until improved. Yes Ajit Martinez MD   sildenafil (VIAGRA) 50 MG tablet Take 50 mg by mouth as needed  Yes Historical Provider, MD   acetaminophen (TYLENOL) 325 MG tablet Take 650 mg by mouth every 6 hours as needed for Pain (last dose 8am) Yes Historical Provider, MD   hydrocortisone 2.5 % cream Apply topically 2 times daily.  Yes Ajit Martinez MD       Past Medical History:   Diagnosis Date    Arthritis     Bilateral renal cysts 6/3/2013    US   6/3/13     DJD (degenerative joint disease) 8/23/2011    Elevated uric acid in blood 5/22/2013    GERD (gastroesophageal reflux disease)     Gout 2013    HTN (hypertension) 5/22/2013    Hypertension     Movement disorder     gout; left knee along w/generallized arthritis    Prostate cancer ([facility] [address] 2/24/16    Renal insufficiency 6/1/2013    TIA (transient ischemic attack)        Social History     Tobacco Use    Smoking status: Never Smoker    Smokeless tobacco:

## 2020-12-07 NOTE — CARE COORDINATION
Date/Time:  12/7/2020 8:48 AM  RN attempted to reach patient by telephone regarding red alert in [facility] remote symptom monitoring program. Left HIPPA compliant message requesting a return call. Will attempt to reach patient again. Comment left in Loop monitoring program with contact information.  Hello, my name is Theresa Worthington, RN with the [facility] [address] [place] [address] Loop Care Team. Thank you for checking in with us. I see that you triggered an Alert. I tried calling you, but it went to voicemail. Please let us know if your symptoms are worse today than yesterday or if you wish to speak to a nurse. You can either call me at [phone] or message us back. We are available between 8 am to 4 pm Mon-Fri. and 9 am to 1 pm on the weekends. Please also call our [facility] ([facility]) Nurse Triage Line (available 24/7) at [phone] any time for any worsening or new symptoms.  Thank you, Solis Alarcon

## 2020-12-14 ENCOUNTER — TELEPHONE (OUTPATIENT)
Dept: FAMILY MEDICINE CLINIC | Age: 55
End: 2020-12-14

## 2020-12-15 NOTE — PROGRESS NOTES
 Alcohol use: No       Family History   Problem Relation Age of Onset    Lupus Mother     Stroke Mother     Other Mother         COPD       OBJECTIVE:  There were no vitals taken for this visit. GEN:  alert and pleasant , in NAD, hoarseness over the phone and occasional cough  Oriented x3  Normal respiratory rate    ASSESSMENT/PLAN:  1.  COVID-19 virus infection  Overall improved,  Tested positive end of November  Return to work 12/21/2020    Follow up if sxs persist or worsen and for routine care with PCP  Flu vaccine when feeling better    12 minutes spent on telephone with this patient

## 2020-12-16 ENCOUNTER — TELEPHONE (OUTPATIENT)
Dept: FAMILY MEDICINE CLINIC | Age: 55
End: 2020-12-16

## 2020-12-16 ENCOUNTER — VIRTUAL VISIT (OUTPATIENT)
Dept: FAMILY MEDICINE CLINIC | Age: 55
End: 2020-12-16
Payer: COMMERCIAL

## 2020-12-16 PROCEDURE — 99442 PR PHYS/QHP TELEPHONE EVALUATION 11-20 MIN: CPT | Performed by: FAMILY MEDICINE

## 2020-12-16 NOTE — LETTER
NOTIFICATION RETURN TO WORK / SCHOOL    12/16/2020    Mr. Brandi Quijano [address]      To Whom It May Concern:    Brandi Montemayor was tested for COVID-19 on 12/1, and the result was positive     He may return to work on 12/21/2020. I recommend:return without restrictions    If there are questions or concerns, please have the patient contact our office.         Sincerely,      Duarte Martino MD

## 2021-03-04 DIAGNOSIS — E79.0 ELEVATED URIC ACID IN BLOOD: ICD-10-CM

## 2021-03-04 DIAGNOSIS — E78.2 MIXED HYPERLIPIDEMIA: ICD-10-CM

## 2021-03-04 DIAGNOSIS — I10 ESSENTIAL HYPERTENSION: ICD-10-CM

## 2021-03-04 RX ORDER — ALLOPURINOL 100 MG/1
TABLET ORAL
Qty: 180 TABLET | Refills: 2 | Status: SHIPPED | OUTPATIENT
Start: 2021-03-04 | End: 2022-02-15 | Stop reason: SDUPTHER

## 2021-03-04 RX ORDER — LOSARTAN POTASSIUM 100 MG/1
TABLET ORAL
Qty: 90 TABLET | Refills: 2 | Status: SHIPPED | OUTPATIENT
Start: 2021-03-04 | End: 2022-02-15 | Stop reason: SDUPTHER

## 2021-03-04 RX ORDER — ATORVASTATIN CALCIUM 10 MG/1
TABLET, FILM COATED ORAL
Qty: 90 TABLET | Refills: 2 | Status: SHIPPED | OUTPATIENT
Start: 2021-03-04 | End: 2022-02-15 | Stop reason: SDUPTHER

## 2021-03-04 RX ORDER — PANTOPRAZOLE SODIUM 40 MG/1
TABLET, DELAYED RELEASE ORAL
Qty: 90 TABLET | Refills: 2 | Status: SHIPPED | OUTPATIENT
Start: 2021-03-04 | End: 2022-02-15 | Stop reason: SDUPTHER

## 2021-03-04 NOTE — TELEPHONE ENCOUNTER
Medication:   Requested Prescriptions     Pending Prescriptions Disp Refills    atorvastatin (LIPITOR) 10 MG tablet [Pharmacy Med Name: ATORVASTATIN 10 MG TABLET] 30 tablet 2     Sig: TAKE ONE TABLET BY MOUTH ONCE NIGHTLY    losartan (COZAAR) 100 MG tablet [Pharmacy Med Name: LOSARTAN POTASSIUM 100 MG TAB] 30 tablet 2     Sig: TAKE ONE TABLET BY MOUTH DAILY    pantoprazole (PROTONIX) 40 MG tablet [Pharmacy Med Name: PANTOPRAZOLE SOD DR 40 MG TAB] 30 tablet 3     Sig: TAKE ONE TABLET BY MOUTH EVERY MORNING BEFORE BREAKFAST    allopurinol (ZYLOPRIM) 100 MG tablet [Pharmacy Med Name: ALLOPURINOL 100 MG TABLET] 60 tablet 2     Sig: TAKE TWO TABLETS BY MOUTH DAILY       Last Filled:  Allopurinol 2/21/20 #180, 3 RF  protonix 8/31/2020 #30, 4 RF  Losartan 5/21/2020 #90, 1 RF  Atorvastatin 2/21/20 #90, 3 RF     Patient Phone Number: [phone] (home) [phone] (work)    Last appt: 12/16/2020 COVID   Next appt: Visit date not found    Lab Results   Component Value Date     02/21/2020    K 4.4 02/21/2020     02/21/2020    CO2 26 02/21/2020    BUN 14 02/21/2020    CREATININE 1.5 (H) 02/21/2020    GLUCOSE 86 02/21/2020    CALCIUM 9.3 02/21/2020    PROT 6.4 02/21/2020    LABALBU 4.0 02/21/2020    BILITOT 1.5 (H) 02/21/2020    ALKPHOS 56 02/21/2020    AST 25 02/21/2020    ALT 19 02/21/2020    LABGLOM 49 (A) 02/21/2020    GFRAA 59 (A) 02/21/2020    AGRATIO 1.7 02/21/2020    GLOB 2.4 02/21/2020     Lab Results   Component Value Date    CHOL 160 02/21/2020    CHOL 157 01/25/2019    CHOL 152 07/23/2017     Lab Results   Component Value Date    TRIG 71 02/21/2020    TRIG 62 01/25/2019    TRIG 114 07/23/2017     Lab Results   Component Value Date    HDL 54 02/21/2020    HDL 48 01/25/2019    HDL 36 (L) 07/23/2017     Lab Results   Component Value Date    LDLCHOLESTEROL 106 (H) 10/19/2015    LDLCALC 92 02/21/2020    LDLCALC 97 01/25/2019    LDLCALC 93 07/23/2017     Lab Results   Component Value Date    LABVLDL 14

## 2021-03-09 ENCOUNTER — HOSPITAL ENCOUNTER (EMERGENCY)
Age: 56
Discharge: HOME | End: 2021-03-10
Attending: STUDENT IN AN ORGANIZED HEALTH CARE EDUCATION/TRAINING PROGRAM
Payer: COMMERCIAL

## 2021-03-09 DIAGNOSIS — M79.662 PAIN OF LEFT CALF: Primary | ICD-10-CM

## 2021-03-09 PROCEDURE — 99283 EMERGENCY DEPT VISIT LOW MDM: CPT

## 2021-03-09 ASSESSMENT — PAIN SCALES - GENERAL: PAINLEVEL_OUTOF10: 7

## 2021-03-10 VITALS
SYSTOLIC BLOOD PRESSURE: 124 MMHG | WEIGHT: 267 LBS | TEMPERATURE: 97 F | DIASTOLIC BLOOD PRESSURE: 85 MMHG | BODY MASS INDEX: 40.6 KG/M2 | RESPIRATION RATE: 18 BRPM | HEART RATE: 98 BPM | OXYGEN SATURATION: 100 %

## 2021-03-10 PROCEDURE — 6370000000 HC RX 637 (ALT 250 FOR IP): Performed by: STUDENT IN AN ORGANIZED HEALTH CARE EDUCATION/TRAINING PROGRAM

## 2021-03-10 RX ADMIN — APIXABAN 10 MG: 5 TABLET, FILM COATED ORAL at 00:33

## 2021-03-10 ASSESSMENT — ENCOUNTER SYMPTOMS
SHORTNESS OF BREATH: 0
CHOKING: 0
CHEST TIGHTNESS: 0

## 2021-03-10 NOTE — ED PROVIDER NOTES
[address] [facility]      Pt Name: Rupinder Panchal  MRN: [id number]  [place] 1965  Date of evaluation: 3/9/2021  Provider: Romeo Oscar MD    [address] [place] [address]       Chief Complaint   Patient presents with    Leg Pain     pt with pain to his left calf for several days now. pt states pain all the time. denies any injury. HISTORY OF PRESENT ILLNESS   (Location/Symptom, Timing/Onset, Context/Setting, Quality, Duration, Modifying Factors, Severity)  Note limiting factors. Rupinder Panchal is a 48 y.o. male who presents to the emergency department with left calf pain for the last 1 week. States that the pain is described as a sharp cramping pain localized to the mid left calf. Not identify any known trigger. He denies any known strain or injury. He has been able to walk on the calf without difficulty. He has been using massage at home without much relief. He is concerned about a blood clot. He is on aspirin therapy only. He denies any history of blood clots. He denies any history of severe bleeding. No associated chest pain, hemoptysis or shortness of breath. Nursing Notes were reviewed. REVIEW OF SYSTEMS    (2-9 systems for level 4, 10 or more for level 5)     Review of Systems   Respiratory: Negative for choking, chest tightness and shortness of breath. Cardiovascular: Positive for leg swelling. Negative for chest pain. Musculoskeletal: Positive for arthralgias. Negative for gait problem and joint swelling. Skin: Negative for rash and wound. All other systems reviewed and are negative. Except as noted above the remainder of the review of systems was reviewed and negative.        PAST MEDICAL HISTORY     Past Medical History:   Diagnosis Date    Arthritis     Bilateral renal cysts 6/3/2013       6/3/13     DJD (degenerative joint disease) 8/23/2011    Elevated uric acid in blood 5/22/2013    GERD (gastroesophageal reflux disease)     Gout 2013    HTN (hypertension) 5/22/2013    Hypertension     Movement disorder     gout; left knee along w/generallized arthritis    Prostate cancer ([facility] [address] 2/24/16    Renal insufficiency 6/1/2013    TIA (transient ischemic attack)          SURGICAL HISTORY       Past Surgical History:   Procedure Laterality Date    COLONOSCOPY  3/10/16    repeat 10 yrs    ELBOW SURGERY  2010    [facility]    ENDOSCOPY, COLON, DIAGNOSTIC      FOOT SURGERY  1999    left    PROSTATECTOMY  2016    [place]    PROSTATECTOMY  3/28/16    robotic laparoscopic radical     WRIST SURGERY           CURRENT MEDICATIONS       Previous Medications    ACETAMINOPHEN (TYLENOL) 325 MG TABLET    Take 650 mg by mouth every 6 hours as needed for Pain (last dose 8am)    ALLOPURINOL (ZYLOPRIM) 100 MG TABLET    TAKE TWO TABLETS BY MOUTH DAILY    ASPIRIN 81 MG CHEWABLE TABLET    CHEW ONE TABLET BY MOUTH DAILY    ATORVASTATIN (LIPITOR) 10 MG TABLET    TAKE ONE TABLET BY MOUTH ONCE NIGHTLY    BETAMETHASONE DIPROPIONATE (DIPROLENE) 0.05 % OINTMENT    Apply to affected areas on the arms, chest and thighs twice daily for up to 2 weeks or until improved. FLUTICASONE (FLONASE) 50 MCG/ACT NASAL SPRAY    1 spray by Each Nostril route daily    HYDROCORTISONE 2.5 % CREAM    Apply topically 2 times daily. LORATADINE (CLARITIN) 10 MG TABLET    Take 1 tablet by mouth daily    LOSARTAN (COZAAR) 100 MG TABLET    TAKE ONE TABLET BY MOUTH DAILY    MOMETASONE (ASMANEX, 30 METERED DOSES,) 110 MCG/INH AEPB    Inhale 2 puffs into the lungs 2 times daily    PANTOPRAZOLE (PROTONIX) 40 MG TABLET    TAKE ONE TABLET BY MOUTH EVERY MORNING BEFORE BREAKFAST    SILDENAFIL (VIAGRA) 50 MG TABLET    Take 50 mg by mouth as needed     TRIAMCINOLONE (KENALOG) 0.1 % OINTMENT    Apply to affected areas on the chin twice daily for up to 2 weeks or until improved.        ALLERGIES     Aleve [naproxen sodium], Orellana, and Norvasc [amlodipine besylate]    FAMILY HISTORY       Family History   Problem Relation Age of Onset    Lupus Mother     Stroke Mother     Other Mother         COPD          SOCIAL HISTORY       Social History     Socioeconomic History    Marital status: Legally      Spouse name: Mckenna Castelan Number of children: 2    Years of education: None    Highest education level: None   Occupational History    None   Social Needs    Financial resource strain: None    Food insecurity     Worry: None     Inability: None    Transportation needs     Medical: None     Non-medical: None   Tobacco Use    Smoking status: Never Smoker    Smokeless tobacco: Never Used   Substance and Sexual Activity    Alcohol use: No    Drug use: No    Sexual activity: Yes     Partners: Female   Lifestyle    Physical activity     Days per week: None     Minutes per session: None    Stress: None   Relationships    Social connections     Talks on phone: None     Gets together: None     Attends Jain service: None     Active member of club or organization: None     Attends meetings of clubs or organizations: None     Relationship status: None    Intimate partner violence     Fear of current or ex partner: None     Emotionally abused: None     Physically abused: None     Forced sexual activity: None   Other Topics Concern    None   Social History Narrative    None       SCREENINGS                        PHYSICAL EXAM    (up to 7 for level 4, 8 or more for level 5)     ED Triage Vitals [03/09/21 2258]   BP Temp Temp Source Pulse Resp SpO2 Height Weight   (!) 145/100 97 °F (36.1 °C) Oral 79 18 98 % -- 267 lb (121.1 kg)       Physical Exam  Constitutional:       Appearance: Normal appearance. HENT:      Head: Normocephalic and atraumatic. Eyes:      General:         Right eye: No discharge. Left eye: No discharge. Conjunctiva/sclera: Conjunctivae normal.   Cardiovascular:      Rate and Rhythm: Normal rate and regular rhythm.       Heart sounds: Normal heart sounds. No murmur. Pulmonary:      Effort: Pulmonary effort is normal. No respiratory distress. Breath sounds: Normal breath sounds. Abdominal:      Palpations: Abdomen is soft. Tenderness: There is no abdominal tenderness. Musculoskeletal:         General: Tenderness present. No swelling. Right lower leg: No edema. Left lower leg: Edema present. Comments: Mild left calf edema when compared to the right. +Left calf tenderness. Positive Homans' sign on left. 2+ PT and DP pulses on the left. Able to range the L knee and ankle without difficulty. Able to bear weight on the left extremity without difficulty. Calf compartment is soft    Skin:     General: Skin is warm and dry. Capillary Refill: Capillary refill takes less than 2 seconds. Neurological:      Mental Status: He is alert and oriented to person, place, and time. Psychiatric:         Mood and Affect: Mood normal.         Behavior: Behavior normal.         DIAGNOSTIC RESULTS         RADIOLOGY:   Non-plain film images such as CT, Ultrasound and MRI are read by the radiologist. Plain radiographic images are visualized and preliminarily interpreted by the emergency physician with the below findings:        Interpretation per the Radiologist below, if available at the time of this note:    US DUP LOWER EXTREMITY LEFT FABIAN    (Results Pending)         LABS:  Labs Reviewed - No data to display    All other labs were within normal range or not returned as of this dictation.     EMERGENCY DEPARTMENT COURSE and DIFFERENTIAL DIAGNOSIS/MDM:   Vitals:    Vitals:    03/09/21 2258   BP: (!) 145/100   Pulse: 79   Resp: 18   Temp: 97 °F (36.1 °C)   TempSrc: Oral   SpO2: 98%   Weight: 267 lb (121.1 kg)     Medications   apixaban (ELIQUIS) tablet 10 mg (has no administration in time range)     Followed by   apixaban (ELIQUIS) tablet 10 mg (has no administration in time range)     Course and MDM:  Patient is 60-year-old male presenting to the emergency room for left lower leg pain and swelling over the last 1 week. On my exam he is comfortable and hemodynamically stable. There is no overlying signs of trauma. The left lower extremity is neurovascularly intact and is able to range the joints without difficulty. Lollie Wendi' sign is positive. No history of blood clots or significant bleeding. There is  significant suspicion for DVT and I start Eliquis therapy here and provided the starter pack. Presentation is not consistent with compartment syndrome, acute fracture or dislocation, cellulitis, arterial occlusion or a septic joint. Unfortunately I am unable to obtain a DVT study at this hour and this was ordered for him to obtain as an outpatient this week. He understands he must return to the ER if any bleeding, shortness of breath or chest pain. Otherwise he is stable for PCP follow-up within the next 3 days. He is agreeable to plan. PROCEDURES:  Unless otherwise noted below, none     Procedures        FINAL IMPRESSION      1. Pain of left calf          DISPOSITION/PLAN   DISPOSITION Discharge - Pending Orders Complete 03/10/2021 12:21:46 AM      PATIENT REFERRED TO:  David Lewis MD  [address] [place] [address]. [address] [place], [place] [address]    In 1 week        DISCHARGE MEDICATIONS:  New Prescriptions    No medications on file     Controlled Substances Monitoring:     No flowsheet data found.     (Please note that portions of this note were completed with a voice recognition program.  Efforts were made to edit the dictations but occasionally words are mis-transcribed.)    Ammon Metz MD (electronically signed)  Attending Emergency Physician         Willie Ruiz MD  03/10/21 9188

## 2021-03-10 NOTE — PROGRESS NOTES
Please call this patient and make sure he will proceed with vascular study to rule out blood clot in his leg

## 2021-03-15 ENCOUNTER — TELEPHONE (OUTPATIENT)
Dept: FAMILY MEDICINE CLINIC | Age: 56
End: 2021-03-15

## 2021-03-15 NOTE — TELEPHONE ENCOUNTER
If he has had Covid, he should isolate for 1 week to make sure he does not develop symptoms    Has  patient had his Doppler study to rule out blood clot in his leg?

## 2021-03-15 NOTE — LETTER
[address]  Phone: [phone]  Fax: [phone]    Haritha Reaves MD        March 15, 2021     Patient: Sruthi Laws   YOB: 1965   Date of Visit: 3/15/2021       To Whom It May Concern: It is my medical opinion that Peterson Scott needs to isolate for one week from 03/15/2021 thru 03/22/2021 due to him being exposed to Covid. If you have any questions or concerns, please don't hesitate to call.     Sincerely,        Haritha Reaves MD Jael Nino is a 7year old male. HPI:     HPI     EP/ 7 yr old here for a complete exam. LDE 3/23/15 with mild hyperopia OU; no glasses needed. Pt denies any blurred vision and mom has not noticed any vision problems.      Last edited by Angelito Duarte 7/9            Slit Lamp and Fundus Exam     External Exam       Right Left    External Normal Normal          Slit Lamp Exam       Right Left    Lids/Lashes Normal Normal    Conjunctiva/Sclera Normal Normal    Cornea Clear Clear    Anterior Chamber Deep a

## 2021-03-15 NOTE — TELEPHONE ENCOUNTER
Pt letter is printed needs to be signed.   Once letter is signed need to e-mail to patient  Also, pt did not get his vaccine yet

## 2021-03-15 NOTE — TELEPHONE ENCOUNTER
Pt needs a note for work and e-mail the note [email or web address]  Also, patient had to cancel the test it was scheduled for tomorrow and he will call them back to reschedule the test for a different date.  Please advise

## 2021-03-15 NOTE — TELEPHONE ENCOUNTER
Write a note that patient needs to isolate for 1 week . ..as he was exposed to Covid again     He did have covid in December. .. Has he had vaccine ?

## 2021-03-19 ENCOUNTER — OFFICE VISIT (OUTPATIENT)
Dept: PRIMARY CARE CLINIC | Age: 56
End: 2021-03-19
Payer: COMMERCIAL

## 2021-03-19 DIAGNOSIS — Z20.828 EXPOSURE TO SARS-ASSOCIATED CORONAVIRUS: Primary | ICD-10-CM

## 2021-03-19 LAB — SARS-COV-2: NOT DETECTED

## 2021-03-19 PROCEDURE — 99211 OFF/OP EST MAY X REQ PHY/QHP: CPT | Performed by: NURSE PRACTITIONER

## 2021-03-22 NOTE — PATIENT INSTRUCTIONS

## 2021-03-22 NOTE — PROGRESS NOTES
Maribell Eisenberg received a viral test for COVID-19. They were educated on isolation and quarantine as appropriate. For any symptoms, they were directed to seek care from their PCP, given contact information to establish with a doctor, directed to an urgent care or the emergency room.

## 2021-03-24 ENCOUNTER — TELEPHONE (OUTPATIENT)
Dept: FAMILY MEDICINE CLINIC | Age: 56
End: 2021-03-24

## 2021-03-29 ENCOUNTER — TELEPHONE (OUTPATIENT)
Dept: FAMILY MEDICINE CLINIC | Age: 56
End: 2021-03-29

## 2021-03-29 DIAGNOSIS — E78.2 MIXED HYPERLIPIDEMIA: ICD-10-CM

## 2021-03-29 DIAGNOSIS — C61 PROSTATE CANCER (HCC): ICD-10-CM

## 2021-03-29 DIAGNOSIS — I10 ESSENTIAL HYPERTENSION: ICD-10-CM

## 2021-03-29 DIAGNOSIS — I15.0 RENOVASCULAR HYPERTENSION: Primary | Chronic | ICD-10-CM

## 2021-03-29 NOTE — TELEPHONE ENCOUNTER
Lvmtcb. Needs appointment with Dr. Jennifer Fairchild in the next 2 weeks for yearly check up and labs. Also, ask patient if he was able to get his doppler done? Please schedule pt when he calls back.

## 2021-12-09 ENCOUNTER — NURSE ONLY (OUTPATIENT)
Dept: FAMILY MEDICINE CLINIC | Age: 56
End: 2021-12-09

## 2021-12-09 ENCOUNTER — TELEPHONE (OUTPATIENT)
Dept: FAMILY MEDICINE CLINIC | Age: 56
End: 2021-12-09

## 2021-12-09 DIAGNOSIS — R68.89 FLU-LIKE SYMPTOMS: Primary | ICD-10-CM

## 2021-12-09 DIAGNOSIS — R09.81 NASAL SINUS CONGESTION: Primary | ICD-10-CM

## 2021-12-09 DIAGNOSIS — R09.81 NASAL SINUS CONGESTION: ICD-10-CM

## 2021-12-09 DIAGNOSIS — R05.9 COUGH: ICD-10-CM

## 2021-12-09 DIAGNOSIS — R52 BODY ACHES: ICD-10-CM

## 2021-12-09 LAB
INFLUENZA A ANTIGEN, POC: NORMAL
INFLUENZA B ANTIGEN, POC: NORMAL

## 2021-12-09 PROCEDURE — 87804 INFLUENZA ASSAY W/OPTIC: CPT | Performed by: FAMILY MEDICINE

## 2021-12-09 NOTE — TELEPHONE ENCOUNTER
Pt would like to receive a Covid test. He is congested, has a sore throat, and some one in his household testes positive for Covid. Please advise. Aurora Abhilashies

## 2021-12-10 LAB — SARS-COV-2: NOT DETECTED

## 2022-02-15 ENCOUNTER — NURSE TRIAGE (OUTPATIENT)
Dept: OTHER | Facility: CLINIC | Age: 57
End: 2022-02-15

## 2022-02-15 ENCOUNTER — OFFICE VISIT (OUTPATIENT)
Dept: FAMILY MEDICINE CLINIC | Age: 57
End: 2022-02-15
Payer: COMMERCIAL

## 2022-02-15 ENCOUNTER — TELEPHONE (OUTPATIENT)
Dept: FAMILY MEDICINE CLINIC | Age: 57
End: 2022-02-15

## 2022-02-15 VITALS
DIASTOLIC BLOOD PRESSURE: 88 MMHG | WEIGHT: 294 LBS | OXYGEN SATURATION: 99 % | BODY MASS INDEX: 44.7 KG/M2 | SYSTOLIC BLOOD PRESSURE: 137 MMHG | HEART RATE: 68 BPM

## 2022-02-15 DIAGNOSIS — E79.0 ELEVATED URIC ACID IN BLOOD: ICD-10-CM

## 2022-02-15 DIAGNOSIS — I10 ESSENTIAL HYPERTENSION: ICD-10-CM

## 2022-02-15 DIAGNOSIS — F43.23 ADJUSTMENT REACTION WITH ANXIETY AND DEPRESSION: Primary | ICD-10-CM

## 2022-02-15 DIAGNOSIS — E78.2 MIXED HYPERLIPIDEMIA: ICD-10-CM

## 2022-02-15 PROCEDURE — 99214 OFFICE O/P EST MOD 30 MIN: CPT | Performed by: FAMILY MEDICINE

## 2022-02-15 RX ORDER — ATORVASTATIN CALCIUM 10 MG/1
10 TABLET, FILM COATED ORAL NIGHTLY
Qty: 90 TABLET | Refills: 2 | Status: SHIPPED | OUTPATIENT
Start: 2022-02-15 | End: 2022-03-16

## 2022-02-15 RX ORDER — ALLOPURINOL 100 MG/1
200 TABLET ORAL DAILY
Qty: 180 TABLET | Refills: 2 | Status: SHIPPED | OUTPATIENT
Start: 2022-02-15

## 2022-02-15 RX ORDER — TRAZODONE HYDROCHLORIDE 50 MG/1
50 TABLET ORAL NIGHTLY PRN
Qty: 30 TABLET | Refills: 5 | Status: SHIPPED
Start: 2022-02-15 | End: 2022-03-29 | Stop reason: ALTCHOICE

## 2022-02-15 RX ORDER — PANTOPRAZOLE SODIUM 40 MG/1
40 TABLET, DELAYED RELEASE ORAL
Qty: 90 TABLET | Refills: 3 | Status: SHIPPED | OUTPATIENT
Start: 2022-02-15 | End: 2022-03-16

## 2022-02-15 RX ORDER — LOSARTAN POTASSIUM 100 MG/1
100 TABLET ORAL DAILY
Qty: 90 TABLET | Refills: 3 | Status: SHIPPED | OUTPATIENT
Start: 2022-02-15 | End: 2022-03-16

## 2022-02-15 SDOH — ECONOMIC STABILITY: TRANSPORTATION INSECURITY
IN THE PAST 12 MONTHS, HAS THE LACK OF TRANSPORTATION KEPT YOU FROM MEDICAL APPOINTMENTS OR FROM GETTING MEDICATIONS?: NO

## 2022-02-15 SDOH — ECONOMIC STABILITY: TRANSPORTATION INSECURITY
IN THE PAST 12 MONTHS, HAS LACK OF TRANSPORTATION KEPT YOU FROM MEETINGS, WORK, OR FROM GETTING THINGS NEEDED FOR DAILY LIVING?: NO

## 2022-02-15 SDOH — ECONOMIC STABILITY: FOOD INSECURITY: WITHIN THE PAST 12 MONTHS, YOU WORRIED THAT YOUR FOOD WOULD RUN OUT BEFORE YOU GOT MONEY TO BUY MORE.: NEVER TRUE

## 2022-02-15 SDOH — ECONOMIC STABILITY: FOOD INSECURITY: WITHIN THE PAST 12 MONTHS, THE FOOD YOU BOUGHT JUST DIDN'T LAST AND YOU DIDN'T HAVE MONEY TO GET MORE.: NEVER TRUE

## 2022-02-15 ASSESSMENT — PATIENT HEALTH QUESTIONNAIRE - PHQ9
7. TROUBLE CONCENTRATING ON THINGS, SUCH AS READING THE NEWSPAPER OR WATCHING TELEVISION: 3
SUM OF ALL RESPONSES TO PHQ QUESTIONS 1-9: 25
3. TROUBLE FALLING OR STAYING ASLEEP: 3
8. MOVING OR SPEAKING SO SLOWLY THAT OTHER PEOPLE COULD HAVE NOTICED. OR THE OPPOSITE, BEING SO FIGETY OR RESTLESS THAT YOU HAVE BEEN MOVING AROUND A LOT MORE THAN USUAL: 3
2. FEELING DOWN, DEPRESSED OR HOPELESS: 3
10. IF YOU CHECKED OFF ANY PROBLEMS, HOW DIFFICULT HAVE THESE PROBLEMS MADE IT FOR YOU TO DO YOUR WORK, TAKE CARE OF THINGS AT HOME, OR GET ALONG WITH OTHER PEOPLE: 3
9. THOUGHTS THAT YOU WOULD BE BETTER OFF DEAD, OR OF HURTING YOURSELF: 1
5. POOR APPETITE OR OVEREATING: 3
SUM OF ALL RESPONSES TO PHQ QUESTIONS 1-9: 25
SUM OF ALL RESPONSES TO PHQ QUESTIONS 1-9: 24
4. FEELING TIRED OR HAVING LITTLE ENERGY: 3
6. FEELING BAD ABOUT YOURSELF - OR THAT YOU ARE A FAILURE OR HAVE LET YOURSELF OR YOUR FAMILY DOWN: 3
SUM OF ALL RESPONSES TO PHQ QUESTIONS 1-9: 25
SUM OF ALL RESPONSES TO PHQ9 QUESTIONS 1 & 2: 6
1. LITTLE INTEREST OR PLEASURE IN DOING THINGS: 3

## 2022-02-15 ASSESSMENT — SOCIAL DETERMINANTS OF HEALTH (SDOH): HOW HARD IS IT FOR YOU TO PAY FOR THE VERY BASICS LIKE FOOD, HOUSING, MEDICAL CARE, AND HEATING?: NOT HARD AT ALL

## 2022-02-15 NOTE — LETTER
[address]  Phone: [phone]  Fax: [phone]    Jerrell Lundborg, MD        February 15, 2022     Patient: Sal Contreras   YOB: 1965   Date of Visit: 2/15/2022       To Whom It May Concern: It is my medical opinion that Ras Conde should remain out of work until further notice , grieving death of his mother. .    If you have any questions or concerns, please don't hesitate to call.     Sincerely,        Jerrell Lundborg, MD

## 2022-02-15 NOTE — PROGRESS NOTES
breakfast) Yes Lynne Escobar MD   allopurinol (ZYLOPRIM) 100 MG tablet Take 2 tablets by mouth daily Yes Lynne Escobar MD   ASPIRIN LOW DOSE 81 MG chewable tablet CHEW ONE TABLET BY MOUTH DAILY Yes Rosa Maria Eduardo MD   mometasone (ASMANEX, 30 METERED DOSES,) 110 MCG/INH AEPB Inhale 2 puffs into the lungs 2 times daily Yes Lynne Escobar MD   fluticasone (FLONASE) 50 MCG/ACT nasal spray 1 spray by Each Nostril route daily Yes Lynne Escobar MD   loratadine (CLARITIN) 10 MG tablet Take 1 tablet by mouth daily Yes Lynne Escobar MD   betamethasone dipropionate (DIPROLENE) 0.05 % ointment Apply to affected areas on the arms, chest and thighs twice daily for up to 2 weeks or until improved. Yes Lynne Escobar MD   triamcinolone (KENALOG) 0.1 % ointment Apply to affected areas on the chin twice daily for up to 2 weeks or until improved. Yes Lynne Escobar MD   sildenafil (VIAGRA) 50 MG tablet Take 50 mg by mouth as needed  Yes Historical Provider, MD   acetaminophen (TYLENOL) 325 MG tablet Take 650 mg by mouth every 6 hours as needed for Pain (last dose 8am) Yes Historical Provider, MD   hydrocortisone 2.5 % cream Apply topically 2 times daily.  Yes Lynne Escobar MD       Past Medical History:   Diagnosis Date    Arthritis     Bilateral renal cysts 6/3/2013    US   6/3/13     DJD (degenerative joint disease) 8/23/2011    Elevated uric acid in blood 5/22/2013    GERD (gastroesophageal reflux disease)     Gout 2013    HTN (hypertension) 5/22/2013    Hypertension     Movement disorder     gout; left knee along w/generallized arthritis    Prostate cancer ([facility] [address] 2/24/16    Renal insufficiency 6/1/2013    TIA (transient ischemic attack)        Social History     Tobacco Use    Smoking status: Never Smoker    Smokeless tobacco: Never Used   Substance Use Topics    Alcohol use: No       Family History   Problem Relation Age of Onset    Lupus Mother     Stroke Mother    Bhatt Other Mother         COPD OBJECTIVE:  /88   Pulse 68   Wt 294 lb (133.4 kg)   SpO2 99%   BMI 44.70 kg/m²   GEN:  in NAD  HEENT:  NCAT, TMs:normal and throat: clear  NECK:  Supple without adenopathy. CV:  Regular rate and rhythm, S1 and S2 normal, no murmurs, clicks  PULM:  Chest is clear, no wheezing ,  symmetric air entry throughout both lung fields. ABD: Soft, NT  EXT: No rash or edema  NEURO: Alert oriented ×3, nonfocal     ASHLI 7=17    PHQ9= 25    ASSESSMENT/PLAN:  1. Adjustment reaction with anxiety and depression  Counseling ASAP through insurance, 1 800 number or teledoc number on his insurance   Trazodone at bedtime for sleep    Close follow-up  [facility] Amando Diallo end of the week or early next  Note given To continue to be out of work until feeling well enough to work    2. Mixed hyperlipidemia  Stable continue, refill  - atorvastatin (LIPITOR) 10 MG tablet; Take 1 tablet by mouth nightly  Dispense: 90 tablet; Refill: 2    3. Essential hypertension  Stable continue, refill   - losartan (COZAAR) 100 MG tablet; Take 1 tablet by mouth daily  Dispense: 90 tablet; Refill: 3    4. Elevated uric acid in blood  Stable continue,  refill  - allopurinol (ZYLOPRIM) 100 MG tablet; Take 2 tablets by mouth daily  Dispense: 180 tablet;  Refill: 2      30 Total Minutes spent pre charting (reviewing problem list, meds, any test results, consultant and hospital notes ) and  obtaining present visit history, performing appropriate medical exam/evaluation, counseling and educating the patient (and family), ordering medications ,tests, and procedures as needed, refilling medication(s), placing referral(s) when needed in addition to coordinating care for this patient and documenting in electronic health record

## 2022-02-15 NOTE — TELEPHONE ENCOUNTER
.Received call from Teo Gabriel at [facility] with Red Flag Complaint. Subjective: Caller states \"Going through a major depression right now\"   Has suicidal ideations    Current Symptoms: Buried mother this past week and that has opened up old wounds  No counseling in the past and none now  Mother was the one that I would talk to, don't have anyone   Pt crying  Does not feel like he wants to go to the ER  Father was killed, sister is dead and now mom. Has a brother in [place] to go back to work yesterday and ended up having to leave, overwhelming    Seeing as Inder Ansari is refusing the ER I am going to try and get him into the office today as soon as possible. He is agreeable to this and also agrees to wait   Call to [facility] and explained that I need to talk to the office urgently @ [phone]  Call picked up by Robert Urbina and she will get the nurse for me to speak to, speaking with Jolene Booker RN and she is aware of the situation and trying to get him squeezed into the schedule. Office was able to speak with Dr Tammy Khan and she is able to approve an appointment for [phone] today. Informed Inder Ansari of the appt time and also discussed with him some national hotlines if he needs immediate help. He states that he is better since the call started and he will be fine to wait for the 1115 appt. Reassured him that he can call nurse triage of one of the hotlines anytime. Recommended disposition: ER    Care advice provided, patient verbalizes understanding; denies any other questions or concerns; instructed to call back for any new or worsening symptoms. Patient/caller to follow-up with PCP Inder Fripedroharriet has an appt today at 1115     Attention Provider: Thank you for allowing me to participate in the care of your patient. The patient was connected to triage in response to information provided to the ECC/PSC. Please do not respond through this encounter as the response is not directed to a shared pool.     Reason for Disposition   [1]

## 2022-02-16 NOTE — TELEPHONE ENCOUNTER
Call received from nurse triage patient with SI thoughts and depression no plans and wanting an appointment  MD informed patient had appt scheduled to be seen

## 2022-02-21 ENCOUNTER — TELEMEDICINE (OUTPATIENT)
Dept: FAMILY MEDICINE CLINIC | Age: 57
End: 2022-02-21
Payer: COMMERCIAL

## 2022-02-21 DIAGNOSIS — F43.23 ADJUSTMENT DISORDER WITH MIXED ANXIETY AND DEPRESSED MOOD: ICD-10-CM

## 2022-02-21 DIAGNOSIS — F51.02 ADJUSTMENT INSOMNIA: ICD-10-CM

## 2022-02-21 PROCEDURE — 99442 PR PHYS/QHP TELEPHONE EVALUATION 11-20 MIN: CPT | Performed by: FAMILY MEDICINE

## 2022-02-21 NOTE — PROGRESS NOTES
Kerwin Vyas is a 55 y.o. male. HPI:  Kerwin Vyas, was evaluated through a synchronous (real-time) audio-video encounter. The patient (or guardian if applicable) is aware that this is a billable service, which includes applicable co-pays. This Virtual Visit was conducted with patient's (and/or legal guardian's) consent. The visit was conducted pursuant to the emergency declaration under the [address] [place] [address], [address] [place] [address] authority and the [employer] Act. Patient identification was verified, and a caregiver was present when appropriate. The patient was located in a state where the provider was licensed to provide care. --Dominic Rivero MD on 2/21/2022 at 12:24 PM    An electronic signature was used to authenticate this note. Seeing counselor . .. went ok /first visit, encouraged him to keep going   trazodone starting to help with sleep . Mari Hernandez 4-5 hrs of sleep last night      Wt Readings from Last 3 Encounters:   02/15/22 294 lb (133.4 kg)   03/09/21 267 lb (121.1 kg)   02/21/20 267 lb (121.1 kg)       REVIEW OF SYSTEMS:   CONSTITUTIONAL: See history of present illness,   Weight noted   HEENT: No new vision difficulties or ringing in the ears. RESPIRATORY: No new SOB, PND, orthopnea or cough. CARDIOVASCULAR: no CP, palpitations or SOB with exertion  GI: No nausea, vomiting, diarrhea, constipation, abdominal pain or changes in bowel habits. : No urinary frequency, urgency, incontinence hematuria or dysuria. SKIN: No cyanosis or skin lesions. MUSCULOSKELETAL: No new muscle or joint pain. NEUROLOGICAL: No syncope or TIA-like symptoms.    PSYCHIATRIC: Depressed about mother's recent death, not sleeping    Allergies   Allergen Reactions    Aleve [Naproxen Sodium] Rash    Cherry Swelling    Norvasc [Amlodipine Besylate] Other (See Comments)     Palpitations/chest pressure       Prior to Visit Medications Medication Sig Taking? Authorizing Provider   traZODone (DESYREL) 50 MG tablet Take 1 tablet by mouth nightly as needed for Sleep Yes Delroy Frank MD   atorvastatin (LIPITOR) 10 MG tablet Take 1 tablet by mouth nightly Yes Delroy Frank MD   losartan (COZAAR) 100 MG tablet Take 1 tablet by mouth daily Yes Delroy Frank MD   pantoprazole (PROTONIX) 40 MG tablet Take 1 tablet by mouth every morning (before breakfast) Yes Delroy Frank MD   allopurinol (ZYLOPRIM) 100 MG tablet Take 2 tablets by mouth daily Yes Delroy Frank MD   ASPIRIN LOW DOSE 81 MG chewable tablet CHEW ONE TABLET BY MOUTH DAILY Yes Delroy Frank MD   mometasone (ASMANEX, 30 METERED DOSES,) 110 MCG/INH AEPB Inhale 2 puffs into the lungs 2 times daily Yes Delroy Frank MD   fluticasone (FLONASE) 50 MCG/ACT nasal spray 1 spray by Each Nostril route daily Yes Delroy Frank MD   loratadine (CLARITIN) 10 MG tablet Take 1 tablet by mouth daily Yes Delroy Frank MD   betamethasone dipropionate (DIPROLENE) 0.05 % ointment Apply to affected areas on the arms, chest and thighs twice daily for up to 2 weeks or until improved. Yes Delroy Frank MD   triamcinolone (KENALOG) 0.1 % ointment Apply to affected areas on the chin twice daily for up to 2 weeks or until improved. Yes Delroy Frank MD   sildenafil (VIAGRA) 50 MG tablet Take 50 mg by mouth as needed  Yes Historical Provider, MD   acetaminophen (TYLENOL) 325 MG tablet Take 650 mg by mouth every 6 hours as needed for Pain (last dose 8am) Yes Historical Provider, MD   hydrocortisone 2.5 % cream Apply topically 2 times daily.  Yes Delroy Frank MD       Past Medical History:   Diagnosis Date    Arthritis     Bilateral renal cysts 6/3/2013    US   6/3/13     DJD (degenerative joint disease) 8/23/2011    Elevated uric acid in blood 5/22/2013    GERD (gastroesophageal reflux disease)     Gout 2013    HTN (hypertension) 5/22/2013    Hypertension     Movement disorder     gout; left knee along w/generallized arthritis    Prostate cancer ([facility] [address] 2/24/16    Renal insufficiency 6/1/2013    TIA (transient ischemic attack)        Social History     Tobacco Use    Smoking status: Never Smoker    Smokeless tobacco: Never Used   Substance Use Topics    Alcohol use: No       Family History   Problem Relation Age of Onset    Lupus Mother     Stroke Mother     Other Mother         COPD       OBJECTIVE:  There were no vitals taken for this visit. GEN:  alert and pleasant , more vocal over the phone   Normal  respiratory distress, normal breath sounds, no rales, no wheezing    ASSESSMENT/PLAN:  1. Adjustment disorder with mixed anxiety and depressed mood  Continue with counseling    2.  Adjustment insomnia  Increase trazodone to 100 mg nightly  Recheck with Dr. Dia Davis this week thru mychart       15 Total Minutes spent pre charting (reviewing problem list, reviewing health maintenance items , meds, any test results, consultant and hospital notes ) and  obtaining present visit history, performing appropriate medical exam/evaluation, counseling and educating the patient (and family), ordering medications ,tests, and procedures as needed, refilling medication(s), placing referral(s) when needed in addition to coordinating care for this patient and documenting in electronic health record

## 2022-02-25 ENCOUNTER — TELEPHONE (OUTPATIENT)
Dept: FAMILY MEDICINE CLINIC | Age: 57
End: 2022-02-25

## 2022-02-25 NOTE — TELEPHONE ENCOUNTER
Left voicemail to callback. Please transfer patient to [facility] when he calls back.  Need to ask patient how he is feeling and follow up with patient

## 2022-02-28 ENCOUNTER — HOSPITAL ENCOUNTER (EMERGENCY)
Age: 57
Discharge: PSYCHIATRIC OR SUBSTANCE USE FACILITY | End: 2022-02-28
Attending: EMERGENCY MEDICINE
Payer: COMMERCIAL

## 2022-02-28 ENCOUNTER — HOSPITAL ENCOUNTER (INPATIENT)
Age: 57
LOS: 2 days | Discharge: HOME | DRG: 885 | End: 2022-03-02
Attending: PSYCHIATRY & NEUROLOGY | Admitting: PSYCHIATRY & NEUROLOGY
Payer: COMMERCIAL

## 2022-02-28 ENCOUNTER — APPOINTMENT (OUTPATIENT)
Dept: GENERAL RADIOLOGY | Age: 57
End: 2022-02-28
Payer: COMMERCIAL

## 2022-02-28 VITALS
HEART RATE: 61 BPM | SYSTOLIC BLOOD PRESSURE: 151 MMHG | BODY MASS INDEX: 45.03 KG/M2 | HEIGHT: 69 IN | WEIGHT: 304 LBS | RESPIRATION RATE: 14 BRPM | TEMPERATURE: 98 F | DIASTOLIC BLOOD PRESSURE: 95 MMHG | OXYGEN SATURATION: 98 %

## 2022-02-28 DIAGNOSIS — F32.A DEPRESSION WITH SUICIDAL IDEATION: Primary | ICD-10-CM

## 2022-02-28 DIAGNOSIS — I10 ESSENTIAL HYPERTENSION: ICD-10-CM

## 2022-02-28 DIAGNOSIS — R45.851 DEPRESSION WITH SUICIDAL IDEATION: Primary | ICD-10-CM

## 2022-02-28 PROBLEM — F33.9 MDD (MAJOR DEPRESSIVE DISORDER), RECURRENT EPISODE (HCC): Status: ACTIVE | Noted: 2022-02-28

## 2022-02-28 PROBLEM — F32.9 MAJOR DEPRESSIVE DISORDER, SINGLE EPISODE: Status: ACTIVE | Noted: 2022-02-28

## 2022-02-28 LAB
ACETAMINOPHEN LEVEL: <5 UG/ML (ref 10–30)
AMPHETAMINE SCREEN, URINE: NORMAL
ANION GAP SERPL CALCULATED.3IONS-SCNC: 9 MMOL/L (ref 3–16)
BARBITURATE SCREEN URINE: NORMAL
BASOPHILS ABSOLUTE: 0 K/UL (ref 0–0.2)
BASOPHILS RELATIVE PERCENT: 0.5 %
BENZODIAZEPINE SCREEN, URINE: NORMAL
BILIRUBIN URINE: NEGATIVE
BLOOD, URINE: ABNORMAL
BUN BLDV-MCNC: 19 MG/DL (ref 7–20)
CALCIUM SERPL-MCNC: 9.3 MG/DL (ref 8.3–10.6)
CANNABINOID SCREEN URINE: NORMAL
CHLORIDE BLD-SCNC: 103 MMOL/L (ref 99–110)
CLARITY: CLEAR
CO2: 25 MMOL/L (ref 21–32)
COCAINE METABOLITE SCREEN URINE: NORMAL
COLOR: YELLOW
CREAT SERPL-MCNC: 1.4 MG/DL (ref 0.9–1.3)
EKG ATRIAL RATE: 63 BPM
EKG DIAGNOSIS: NORMAL
EKG P AXIS: 36 DEGREES
EKG P-R INTERVAL: 204 MS
EKG Q-T INTERVAL: 376 MS
EKG QRS DURATION: 86 MS
EKG QTC CALCULATION (BAZETT): 384 MS
EKG R AXIS: 21 DEGREES
EKG T AXIS: 11 DEGREES
EKG VENTRICULAR RATE: 63 BPM
EOSINOPHILS ABSOLUTE: 0.1 K/UL (ref 0–0.6)
EOSINOPHILS RELATIVE PERCENT: 1.2 %
EPITHELIAL CELLS, UA: 0 /HPF (ref 0–5)
ETHANOL: NORMAL MG/DL (ref 0–0.08)
GFR AFRICAN AMERICAN: >60
GFR NON-AFRICAN AMERICAN: 53
GLUCOSE BLD-MCNC: 105 MG/DL (ref 70–99)
GLUCOSE URINE: NEGATIVE MG/DL
HCT VFR BLD CALC: 40.6 % (ref 40.5–52.5)
HEMOGLOBIN: 13.5 G/DL (ref 13.5–17.5)
HYALINE CASTS: 0 /LPF (ref 0–8)
KETONES, URINE: NEGATIVE MG/DL
LEUKOCYTE ESTERASE, URINE: NEGATIVE
LYMPHOCYTES ABSOLUTE: 2.6 K/UL (ref 1–5.1)
LYMPHOCYTES RELATIVE PERCENT: 44.7 %
Lab: NORMAL
MCH RBC QN AUTO: 31.8 PG (ref 26–34)
MCHC RBC AUTO-ENTMCNC: 33.3 G/DL (ref 31–36)
MCV RBC AUTO: 95.6 FL (ref 80–100)
METHADONE SCREEN, URINE: NORMAL
MICROSCOPIC EXAMINATION: YES
MONOCYTES ABSOLUTE: 0.6 K/UL (ref 0–1.3)
MONOCYTES RELATIVE PERCENT: 11 %
NEUTROPHILS ABSOLUTE: 2.4 K/UL (ref 1.7–7.7)
NEUTROPHILS RELATIVE PERCENT: 42.6 %
NITRITE, URINE: NEGATIVE
OPIATE SCREEN URINE: NORMAL
OXYCODONE URINE: NORMAL
PDW BLD-RTO: 13.2 % (ref 12.4–15.4)
PH UA: 6.5
PH UA: 6.5 (ref 5–8)
PHENCYCLIDINE SCREEN URINE: NORMAL
PLATELET # BLD: 190 K/UL (ref 135–450)
PMV BLD AUTO: 8.6 FL (ref 5–10.5)
POTASSIUM REFLEX MAGNESIUM: 4.8 MMOL/L (ref 3.5–5.1)
PRO-BNP: 26 PG/ML (ref 0–124)
PROPOXYPHENE SCREEN: NORMAL
PROTEIN UA: NEGATIVE MG/DL
RBC # BLD: 4.25 M/UL (ref 4.2–5.9)
RBC UA: 5 /HPF (ref 0–4)
SALICYLATE, SERUM: <0.3 MG/DL (ref 15–30)
SARS-COV-2, NAAT: NOT DETECTED
SODIUM BLD-SCNC: 137 MMOL/L (ref 136–145)
SPECIFIC GRAVITY UA: 1.02 (ref 1–1.03)
TSH REFLEX: 1.39 UIU/ML (ref 0.27–4.2)
URINE REFLEX TO CULTURE: ABNORMAL
URINE TYPE: ABNORMAL
UROBILINOGEN, URINE: 1 E.U./DL
WBC # BLD: 5.7 K/UL (ref 4–11)
WBC UA: 1 /HPF (ref 0–5)

## 2022-02-28 PROCEDURE — 6370000000 HC RX 637 (ALT 250 FOR IP): Performed by: PHYSICIAN ASSISTANT

## 2022-02-28 PROCEDURE — 99285 EMERGENCY DEPT VISIT HI MDM: CPT

## 2022-02-28 PROCEDURE — 93010 ELECTROCARDIOGRAM REPORT: CPT | Performed by: INTERNAL MEDICINE

## 2022-02-28 PROCEDURE — 84443 ASSAY THYROID STIM HORMONE: CPT

## 2022-02-28 PROCEDURE — 80048 BASIC METABOLIC PNL TOTAL CA: CPT

## 2022-02-28 PROCEDURE — 80179 DRUG ASSAY SALICYLATE: CPT

## 2022-02-28 PROCEDURE — 80143 DRUG ASSAY ACETAMINOPHEN: CPT

## 2022-02-28 PROCEDURE — 85025 COMPLETE CBC W/AUTO DIFF WBC: CPT

## 2022-02-28 PROCEDURE — 83880 ASSAY OF NATRIURETIC PEPTIDE: CPT

## 2022-02-28 PROCEDURE — 93005 ELECTROCARDIOGRAM TRACING: CPT | Performed by: EMERGENCY MEDICINE

## 2022-02-28 PROCEDURE — 1240000000 HC EMOTIONAL WELLNESS R&B

## 2022-02-28 PROCEDURE — 87635 SARS-COV-2 COVID-19 AMP PRB: CPT

## 2022-02-28 PROCEDURE — 36415 COLL VENOUS BLD VENIPUNCTURE: CPT

## 2022-02-28 PROCEDURE — 80307 DRUG TEST PRSMV CHEM ANLYZR: CPT

## 2022-02-28 PROCEDURE — 71045 X-RAY EXAM CHEST 1 VIEW: CPT

## 2022-02-28 PROCEDURE — 81001 URINALYSIS AUTO W/SCOPE: CPT

## 2022-02-28 PROCEDURE — 82077 ASSAY SPEC XCP UR&BREATH IA: CPT

## 2022-02-28 RX ORDER — DIPHENHYDRAMINE HYDROCHLORIDE 50 MG/ML
50 INJECTION INTRAMUSCULAR; INTRAVENOUS EVERY 4 HOURS PRN
Status: DISCONTINUED | OUTPATIENT
Start: 2022-02-28 | End: 2022-03-02 | Stop reason: HOSPADM

## 2022-02-28 RX ORDER — HYDROXYZINE PAMOATE 25 MG/1
25 CAPSULE ORAL ONCE
Status: COMPLETED | OUTPATIENT
Start: 2022-02-28 | End: 2022-02-28

## 2022-02-28 RX ORDER — ACETAMINOPHEN 325 MG/1
650 TABLET ORAL EVERY 4 HOURS PRN
Status: DISCONTINUED | OUTPATIENT
Start: 2022-02-28 | End: 2022-03-02 | Stop reason: HOSPADM

## 2022-02-28 RX ORDER — POLYETHYLENE GLYCOL 3350 17 G
2 POWDER IN PACKET (EA) ORAL
Status: DISCONTINUED | OUTPATIENT
Start: 2022-02-28 | End: 2022-03-02 | Stop reason: HOSPADM

## 2022-02-28 RX ORDER — OLANZAPINE 10 MG/1
10 INJECTION, POWDER, LYOPHILIZED, FOR SOLUTION INTRAMUSCULAR EVERY 8 HOURS PRN
Status: DISCONTINUED | OUTPATIENT
Start: 2022-02-28 | End: 2022-03-02 | Stop reason: HOSPADM

## 2022-02-28 RX ORDER — MAGNESIUM HYDROXIDE/ALUMINUM HYDROXICE/SIMETHICONE 120; 1200; 1200 MG/30ML; MG/30ML; MG/30ML
30 SUSPENSION ORAL EVERY 6 HOURS PRN
Status: DISCONTINUED | OUTPATIENT
Start: 2022-02-28 | End: 2022-03-02 | Stop reason: HOSPADM

## 2022-02-28 RX ORDER — TRAZODONE HYDROCHLORIDE 50 MG/1
50 TABLET ORAL NIGHTLY PRN
Status: DISCONTINUED | OUTPATIENT
Start: 2022-02-28 | End: 2022-03-02 | Stop reason: HOSPADM

## 2022-02-28 RX ORDER — HYDROXYZINE 50 MG/1
50 TABLET, FILM COATED ORAL 3 TIMES DAILY PRN
Status: DISCONTINUED | OUTPATIENT
Start: 2022-02-28 | End: 2022-03-02 | Stop reason: HOSPADM

## 2022-02-28 RX ORDER — OLANZAPINE 10 MG/1
10 TABLET ORAL EVERY 8 HOURS PRN
Status: DISCONTINUED | OUTPATIENT
Start: 2022-02-28 | End: 2022-03-02 | Stop reason: HOSPADM

## 2022-02-28 RX ADMIN — HYDROXYZINE PAMOATE 25 MG: 25 CAPSULE ORAL at 10:13

## 2022-02-28 ASSESSMENT — LIFESTYLE VARIABLES: HISTORY_ALCOHOL_USE: YES

## 2022-02-28 ASSESSMENT — SLEEP AND FATIGUE QUESTIONNAIRES
DIFFICULTY STAYING ASLEEP: YES
DO YOU HAVE DIFFICULTY SLEEPING: YES
RESTFUL SLEEP: NO
DO YOU USE A SLEEP AID: YES
SLEEP PATTERN: DIFFICULTY FALLING ASLEEP;DIFFICULTY ARISING
AVERAGE NUMBER OF SLEEP HOURS: 3
DIFFICULTY ARISING: YES
DIFFICULTY FALLING ASLEEP: YES

## 2022-02-28 ASSESSMENT — PAIN SCALES - GENERAL
PAINLEVEL_OUTOF10: 4
PAINLEVEL_OUTOF10: 0
PAINLEVEL_OUTOF10: 0

## 2022-02-28 ASSESSMENT — PAIN DESCRIPTION - PAIN TYPE: TYPE: CHRONIC PAIN

## 2022-02-28 NOTE — ED PROVIDER NOTES
In addition to the advanced practice provider, I personally saw Facundo Castellanos and performed a substantive portion of the visit including all aspects of the medical decision making. Briefly, this is a 55 y.o. male here for depressed mood. Patient states his mother recently passed away and he has been having difficulty with worsening symptoms since then. He has been feeling increasingly hopeless. Patient reports thoughts of suicide, although he cannot describe a plan to me. He denies any history of suicide attempts in the past.  Patient also reports feeling generally weak and is concerned about leg swelling. On exam, patient afebrile and nontoxic. No distress. Heart RRR. Lungs CTAB. Abdomen soft, nondistended, nontender to palpation in all quadrants. A&Ox4. Speech clear, face symmetric. CN 2-12 intact. 5/5 motor and sensation grossly intact all extremities. No pronator drift. Intermittently tearful during evaluation. EKG  EKG was reviewed by emergency department physician in the absence of a cardiologist    Narrow complex sinus rhythm, rate 63, normal axis, mildly prolonged PA and normal QRS intervals, normal Qtc, no ST elevations or depressions, normal t-wave morphology, impression SR with borderline 1st degree AV block, no STEMI, no significant change from comparison 7/23/17      Screenings   [place] Coma Scale  Eye Opening: Spontaneous  Best Verbal Response: Oriented  Best Motor Response: Obeys commands  [place] Coma Scale Score: 15        MDM    Patient afebrile and nontoxic. He appears with greatly depressed mood and tearful during evaluation, however in no distress. He is calm and cooperative. EKG without evidence of acute ischemia. No obvious lower extremity edema on my exam, nothing to suggest CHF exacerbation. No evidence of infection. No clinical evidence of intoxication. Toxicology work-up is unremarkable.   Patient unable to contract for safety with ongoing thoughts of suicide, although no plan. Patient was placed on a psychiatric hold. He is medically cleared for transfer for psychiatric evaluation. Patient Referrals:  No follow-up provider specified. Discharge Medications:  New Prescriptions    No medications on file       FINAL IMPRESSION  1. Depression with suicidal ideation    2. Essential hypertension        Blood pressure (!) 151/95, pulse 61, temperature 98 °F (36.7 °C), resp. rate 14, height 5' 9\" (1.753 m), weight (!) 304 lb (137.9 kg), SpO2 98 %. For further details of [facility] emergency department encounter, please see documentation by advanced practice provider, DIMITRI Medina.     Chet Leal DO (electronically signed)  Attending Emergency Physician       Chet Leal DO  02/28/22 3731

## 2022-02-28 NOTE — PLAN OF CARE
[facility]  Admission Note     Admission Type: involuntary       Reason for admission: Major Depression SI       PATIENT STRENGTHS: Desire to seek treatment       Patient Strengths and Limitations: Emotional state       Addictive Behavior:  Deferred       Medical Problems:   Past Medical History:   Diagnosis Date    Arthritis     Bilateral renal cysts 6/3/2013    US   6/3/13     DJD (degenerative joint disease) 8/23/2011    Elevated uric acid in blood 5/22/2013    GERD (gastroesophageal reflux disease)     Gout 2013    HTN (hypertension) 5/22/2013    Hypertension     Movement disorder     gout; left knee along w/generallized arthritis    Prostate cancer ([facility] [address] 2/24/16    Renal insufficiency 6/1/2013    TIA (transient ischemic attack)        Status EXAM:       Tobacco Screening:  Practical Counseling, on admission, teresa X, if applicable and completed (first 3 are required if patient doesn't refuse):            ( )  Recognizing danger situations (included triggers and roadblocks)                    ( )  Coping skills (new ways to manage stress, exercise, relaxation techniques, changing routine, distraction)                                                           ( )  Basic information about quitting (benefits of quitting, techniques in how to quit, available resources  ( ) Referral for counseling faxed to [facility]                                           ( ) Patient refused counseling  ( ) Patient has not smoked in the last 30 days    Metabolic Screening:    Lab Results   Component Value Date    LABA1C 5.0 02/21/2020       Lab Results   Component Value Date    CHOL 160 02/21/2020    CHOL 157 01/25/2019    CHOL 152 07/23/2017    CHOL 167 08/11/2016    CHOL 176 11/05/2015    CHOL 173 10/19/2015    CHOL 158 01/23/2015    CHOL 132 09/26/2014    CHOL 155 09/12/2014    CHOL 163 06/02/2013    CHOL 155 06/04/2012     Lab Results   Component Value Date    TRIG 71 02/21/2020 TRIG 62 01/25/2019    TRIG 114 07/23/2017    TRIG 69 08/11/2016    TRIG 58 11/05/2015    TRIG 62 10/19/2015    TRIG 50 01/23/2015    TRIG 48 09/26/2014    TRIG 66 09/12/2014    TRIG 108 06/02/2013    TRIG 110 06/04/2012     Lab Results   Component Value Date    HDL 54 02/21/2020    HDL 48 01/25/2019    HDL 36 (L) 07/23/2017    HDL 51 08/11/2016    HDL 57 11/05/2015    HDL 55 10/19/2015    HDL 45 01/23/2015    HDL 41 09/26/2014    HDL 50 09/12/2014    HDL 41 06/02/2013    HDL 51 06/04/2012     No components found for: LDLCAL  Lab Results   Component Value Date    LABVLDL 14 02/21/2020    LABVLDL 12 01/25/2019    LABVLDL 12 11/05/2015    LABVLDL 10 01/23/2015    LABVLDL 13 09/12/2014    LABVLDL 22 06/02/2013    LABVLDL 22 06/04/2012         There is no height or weight on file to calculate BMI. BP Readings from Last 2 Encounters:   02/28/22 (!) 151/95   02/15/22 137/88           Pt admitted with followings belongings:        Patient's home medications were n/a. Patient oriented to surroundings and program expectations and copy of patient rights given. Received admission packet:  yes. Consents reviewed, signed n/a. Refused n/a. Patient verbalize understanding:  yes.   Patient education on precautions: yes                   Tre Justin RN

## 2022-02-28 NOTE — PLAN OF CARE
Pt. Presented from [facility] via ambulance service with EMTs with his belongings with him accounted for and documented. Pt presented to [facility] stating he was experiencing Suicidal Ideation and depressed since his mother  a few days ago. Pt. Is tearful, quiet and wants to be in his bedroom. Orders received assessments started.

## 2022-02-28 NOTE — ED PROVIDER NOTES
[address] ENCOUNTER        Pt Name: Sangeetha Cruz  MRN: [id number]  [place] 1965  Date of evaluation: 2022  Provider: Camila Henry PA-C  PCP: Kathrene Dandy, MD  Note Started: 9:50 AM EST        I have seen and evaluated this patient with my supervising physician Giovany Marcano DO.    CHIEF COMPLAINT       Chief Complaint   Patient presents with   [address] Problem     patient states worsening depression and increasing thoughts of self harm       HISTORY OF PRESENT ILLNESS   (Location, Timing/Onset, Context/Setting, Quality, Duration, Modifying Factors, Severity, Associated Signs and Symptoms)  Note limiting factors. Chief Complaint: Mental health issue    Sangeetha Cruz is a 55 y.o. male who presents complaining concern depression and suicidal thoughts. Patient states he is done well throughout life. Rare ups and downs. Patient reports that his mother for whom he cared passed away a couple weeks ago. Patient has had increasing sadness and depression since her passing. He also indicates that her brought up his father's death in  and he  at this facility. The patient is tearful, despondent, gaze downward. He continuously is rubbing his right thigh with right hand. He is obviously emotional.  He is not aggressive. There is no homicidal thoughts or concerns. Patient had phone call to PCP on February 15 and physically seen by office visit by PCP on February 15. Started on trazodone 50 mg at bedtime. There was a phone call to PCP on  with a virtual visit on . Follow-up phone call to PCP on . The patient presents to ED today because of increasing suicidal thought without plan. The cannot tearful, flat affect, soft-spoken voice appears genuinely struggling with depression. This likely secondary to loss of his remaining parent.         Nursing Notes were all reviewed and agreed with or any disagreements were addressed in the HPI. REVIEW OF SYSTEMS    (2-9 systems for level 4, 10 or more for level 5)     Review of Systems    Positives and Pertinent negatives as per HPI. Except as noted above in the ROS, all other systems were reviewed and negative.        PAST MEDICAL HISTORY     Past Medical History:   Diagnosis Date    Arthritis     Bilateral renal cysts 6/3/2013    US   6/3/13     DJD (degenerative joint disease) 8/23/2011    Elevated uric acid in blood 5/22/2013    GERD (gastroesophageal reflux disease)     Gout 2013    HTN (hypertension) 5/22/2013    Hypertension     Movement disorder     gout; left knee along w/generallized arthritis    Prostate cancer ([facility] [address] 2/24/16    Renal insufficiency 6/1/2013    TIA (transient ischemic attack)          SURGICAL HISTORY     Past Surgical History:   Procedure Laterality Date    COLONOSCOPY  3/10/16    repeat 10 yrs    ELBOW SURGERY  2010    [facility]    ENDOSCOPY, COLON, DIAGNOSTIC      FOOT SURGERY  1999    left    PROSTATECTOMY  2016    [place]    PROSTATECTOMY  3/28/16    robotic laparoscopic radical    [facility]       Discharge Medication List as of 2/28/2022  5:16 PM      CONTINUE these medications which have NOT CHANGED    Details   traZODone (DESYREL) 50 MG tablet Take 1 tablet by mouth nightly as needed for Sleep, Disp-30 tablet, R-5Normal      atorvastatin (LIPITOR) 10 MG tablet Take 1 tablet by mouth nightly, Disp-90 tablet, R-2Normal      losartan (COZAAR) 100 MG tablet Take 1 tablet by mouth daily, Disp-90 tablet, R-3Normal      pantoprazole (PROTONIX) 40 MG tablet Take 1 tablet by mouth every morning (before breakfast), Disp-90 tablet, R-3Normal      allopurinol (ZYLOPRIM) 100 MG tablet Take 2 tablets by mouth daily, Disp-180 tablet, R-2Normal      ASPIRIN LOW DOSE 81 MG chewable tablet CHEW ONE TABLET BY MOUTH DAILY, Disp-90 tablet, R-3Normal      mometasone (ASMANEX, 30 METERED DOSES,) 110 MCG/INH AEPB Inhale 2 puffs into the lungs 2 times daily, Inhalation, 2 TIMES DAILY Starting Tue 1/26/2021, Disp-1 Inhaler, R-2, Normal      loratadine (CLARITIN) 10 MG tablet Take 1 tablet by mouth daily, Disp-30 tablet,R-2Normal      fluticasone (FLONASE) 50 MCG/ACT nasal spray 1 spray by Each Nostril route daily, Disp-2 Bottle,R-2Normal      betamethasone dipropionate (DIPROLENE) 0.05 % ointment Apply to affected areas on the arms, chest and thighs twice daily for up to 2 weeks or until improved. , Disp-45 g, R-2, Normal      triamcinolone (KENALOG) 0.1 % ointment Apply to affected areas on the chin twice daily for up to 2 weeks or until improved. , Disp-30 g, R-2, Normal      sildenafil (VIAGRA) 50 MG tablet Take 50 mg by mouth as needed       acetaminophen (TYLENOL) 325 MG tablet Take 650 mg by mouth every 6 hours as needed for Pain (last dose 8am)      hydrocortisone 2.5 % cream Apply topically 2 times daily. , Disp-60 g, R-1, Normal               ALLERGIES     Aleve [naproxen sodium], Orellana, and Norvasc [amlodipine besylate]    FAMILYHISTORY       Family History   Problem Relation Age of Onset    Lupus Mother     Stroke Mother     Other Mother         COPD          SOCIAL HISTORY       Social History     Tobacco Use    Smoking status: Never Smoker    Smokeless tobacco: Never Used   Vaping Use    Vaping Use: Never used   Substance Use Topics    Alcohol use: No    Drug use: No       SCREENINGS    [place] Coma Scale  Eye Opening: Spontaneous  Best Verbal Response: Oriented  Best Motor Response: Obeys commands  [place] Coma Scale Score: 15        PHYSICAL EXAM    (up to 7 for level 4, 8 or more for level 5)     ED Triage Vitals [02/28/22 0934]   BP Temp Temp src Pulse Resp SpO2 Height Weight   (!) 156/106 98 °F (36.7 °C) -- 70 16 99 % 5' 9\" (1.753 m) (!) 304 lb (137.9 kg)       Physical Exam  Vitals and nursing note reviewed. Constitutional:       Appearance: Normal appearance.  He is well-developed. He is obese. HENT:      Head: Normocephalic and atraumatic. Right Ear: External ear normal.      Left Ear: External ear normal.   Eyes:      General: No scleral icterus. Right eye: No discharge. Left eye: No discharge. Conjunctiva/sclera: Conjunctivae normal.   Cardiovascular:      Rate and Rhythm: Normal rate and regular rhythm. Heart sounds: Normal heart sounds. Pulmonary:      Effort: Pulmonary effort is normal.      Breath sounds: Normal breath sounds. Musculoskeletal:         General: Normal range of motion. Cervical back: Normal range of motion and neck supple. Skin:     General: Skin is warm and dry. Neurological:      General: No focal deficit present. Mental Status: He is alert and oriented to person, place, and time. Mental status is at baseline. Psychiatric:         Mood and Affect: Mood normal.         Behavior: Behavior normal.         Thought Content:  Thought content normal.         Judgment: Judgment normal.         DIAGNOSTIC RESULTS   LABS:    Labs Reviewed   BASIC METABOLIC PANEL W/ REFLEX TO MG FOR LOW K - Abnormal; Notable for the following components:       Result Value    Glucose 105 (*)     CREATININE 1.4 (*)     GFR Non- 53 (*)     All other components within normal limits    Narrative:     Performed at:  [facility]  [address] [facility] [employer]   Phone [phone]   URINALYSIS WITH REFLEX TO CULTURE - Abnormal; Notable for the following components:    Blood, Urine TRACE (*)     All other components within normal limits    Narrative:     Performed at:  [facility]  [address] [facility], [address]   Phone [phone]   ACETAMINOPHEN LEVEL - Abnormal; Notable for the following components:    Acetaminophen Level <5 (*)     All other components within normal limits    Narrative:     Performed at:  [facility]  [address],  [place], [address]   Phone [phone]   SALICYLATE LEVEL - Abnormal; Notable for the following components:    Salicylate, Serum <4.8 (*)     All other components within normal limits    Narrative:     Performed at:  [facility]  [address],  [place], [address]   Phone [phone]   MICROSCOPIC URINALYSIS - Abnormal; Notable for the following components:    RBC, UA 5 (*)     All other components within normal limits    Narrative:     Performed at:  [facility]  [address],  [place], [address]   Phone [phone], RAPID    Narrative:     Performed at:  [facility]  [address],  [place], [address]   Phone [phone]   CBC WITH AUTO DIFFERENTIAL    Narrative:     Performed at:  [facility] [address],  [place], [address]   Phone [phone]   ETHANOL    Narrative:     Performed at:  [facility]  [address],  [place], [address]   Phone [phone]   URINE DRUG SCREEN    Narrative:     Performed at:  [facility]  [address],  [place], [address]   Phone [phone]   TSH WITH REFLEX    Narrative:     Performed at:  [facility]  [address] [address]   Phone [phone]   BRAIN NATRIURETIC PEPTIDE    Narrative:     Performed at:  [facility]  [address], [address]   Phone [phone]       When ordered only abnormal lab results are displayed. All other labs were within normal range or not returned as of this dictation. EKG: When ordered, EKG's are interpreted by the Emergency Department Physician in the absence of a cardiologist.  Please see their note for interpretation of EKG.     RADIOLOGY:   Non-plain film images such as CT, Ultrasound and MRI are read by the radiologist. Plain radiographic images are visualized and preliminarily interpreted by the ED Provider with the below findings:        Interpretation per the Radiologist below, if available at the time of this note:    XR CHEST PORTABLE   Final Result   No radiographic evidence of acute pulmonary disease. No results found. PROCEDURES   Unless otherwise noted below, none     Procedures    CRITICAL CARE TIME       CONSULTS:  IP CONSULT TO PSYCHIATRY      EMERGENCY DEPARTMENT COURSE and DIFFERENTIAL DIAGNOSIS/MDM:   Vitals:    Vitals:    02/28/22 0934 02/28/22 1209   BP: (!) 156/106 (!) 151/95   Pulse: 70 61   Resp: 16 14   Temp: 98 °F (36.7 °C)    SpO2: 99% 98%   Weight: (!) 304 lb (137.9 kg)    Height: 5' 9\" (1.753 m)        Patient was given the following medications:  Medications   hydrOXYzine (VISTARIL) capsule 25 mg (25 mg Oral Given 2/28/22 1013)           Patient presenting with signs of progressive depression over the past 2 weeks. Beginning on or about February 15 when he may contact with PCP. Patient appears depressed with a degree of emotional instability. Patient expresses suicidal ideation with progression. No suicidal plan. Patient placed on hold. Patient will be transferred to psychiatric care facility for further evaluation and treatment. ED treatment Vistaril 25 mg p.o. EKG obtained showing no acute process. X-ray showed no acute cardiopulmonary abnormality. Rapid Covid study negative. UA negative. UDS negative. Patient WBC 5.7 hemoglobin 13.5. The patient's BMP showing BUN 19, creatinine 1.4 with a GFR greater than 60. EtOH negative. Patient's acetaminophen level and salicylate levels are both not elevated. The patient's TSH pending. The patient's BNP 26.    Case reviewed with attending physician who agrees with transfer as the patient expresses suicidal ideation with progression and is clinically depressed. The patient lives alone. I believe the patient is high risk for suicide secondary to depression. Is agreeable with transfer to a facility    This patient is medically cleared to proceed with transfer to mental health facility. FINAL IMPRESSION      1. Depression with suicidal ideation    2. Essential hypertension          DISPOSITION/PLAN   DISPOSITION Decision To Transfer 02/28/2022 12:23:42 PM      PATIENT REFERRED TO:  No follow-up provider specified. DISCHARGE MEDICATIONS:  Discharge Medication List as of 2/28/2022  5:16 PM          DISCONTINUED MEDICATIONS:  Discharge Medication List as of 2/28/2022  5:16 PM                 (Please note that portions of this note were completed with a voice recognition program.  Efforts were made to edit the dictations but occasionally words are mis-transcribed. )    Cam Hernandez PA-C (electronically signed)           Cam Hernandez PA-C  03/01/22 1415

## 2022-02-28 NOTE — TELEPHONE ENCOUNTER
Called and spoke with the patient, he is sitting at the Er now at [facility].   Informed patient to call the office if he has any questions or concerns

## 2022-03-01 PROBLEM — R45.851 SUICIDAL IDEATION: Status: ACTIVE | Noted: 2022-03-01

## 2022-03-01 PROBLEM — F43.10 PTSD (POST-TRAUMATIC STRESS DISORDER): Status: ACTIVE | Noted: 2022-03-01

## 2022-03-01 PROBLEM — F33.2 MAJOR DEPRESSIVE DISORDER, RECURRENT SEVERE WITHOUT PSYCHOTIC FEATURES (HCC): Status: ACTIVE | Noted: 2022-02-28

## 2022-03-01 LAB
CHOLESTEROL, TOTAL: 143 MG/DL (ref 0–199)
HDLC SERPL-MCNC: 44 MG/DL (ref 40–60)
LDL CHOLESTEROL CALCULATED: 83 MG/DL
TRIGL SERPL-MCNC: 81 MG/DL (ref 0–150)
TSH SERPL DL<=0.05 MIU/L-ACNC: 0.57 UIU/ML (ref 0.27–4.2)
VLDLC SERPL CALC-MCNC: 16 MG/DL

## 2022-03-01 PROCEDURE — 83036 HEMOGLOBIN GLYCOSYLATED A1C: CPT

## 2022-03-01 PROCEDURE — 6370000000 HC RX 637 (ALT 250 FOR IP)

## 2022-03-01 PROCEDURE — 99221 1ST HOSP IP/OBS SF/LOW 40: CPT | Performed by: NURSE PRACTITIONER

## 2022-03-01 PROCEDURE — 6370000000 HC RX 637 (ALT 250 FOR IP): Performed by: NURSE PRACTITIONER

## 2022-03-01 PROCEDURE — 80061 LIPID PANEL: CPT

## 2022-03-01 PROCEDURE — 36415 COLL VENOUS BLD VENIPUNCTURE: CPT

## 2022-03-01 PROCEDURE — 99223 1ST HOSP IP/OBS HIGH 75: CPT | Performed by: PSYCHIATRY & NEUROLOGY

## 2022-03-01 PROCEDURE — 1240000000 HC EMOTIONAL WELLNESS R&B

## 2022-03-01 PROCEDURE — 84443 ASSAY THYROID STIM HORMONE: CPT

## 2022-03-01 RX ORDER — ATORVASTATIN CALCIUM 10 MG/1
10 TABLET, FILM COATED ORAL NIGHTLY
Status: DISCONTINUED | OUTPATIENT
Start: 2022-03-01 | End: 2022-03-02 | Stop reason: HOSPADM

## 2022-03-01 RX ORDER — ASPIRIN 81 MG/1
81 TABLET, CHEWABLE ORAL DAILY
Status: DISCONTINUED | OUTPATIENT
Start: 2022-03-01 | End: 2022-03-02 | Stop reason: HOSPADM

## 2022-03-01 RX ORDER — ALLOPURINOL 100 MG/1
200 TABLET ORAL DAILY
Status: DISCONTINUED | OUTPATIENT
Start: 2022-03-01 | End: 2022-03-02 | Stop reason: HOSPADM

## 2022-03-01 RX ORDER — PANTOPRAZOLE SODIUM 40 MG/1
40 TABLET, DELAYED RELEASE ORAL
Status: DISCONTINUED | OUTPATIENT
Start: 2022-03-02 | End: 2022-03-02 | Stop reason: HOSPADM

## 2022-03-01 RX ORDER — FLUTICASONE PROPIONATE 50 MCG
1 SPRAY, SUSPENSION (ML) NASAL DAILY
Status: DISCONTINUED | OUTPATIENT
Start: 2022-03-01 | End: 2022-03-02 | Stop reason: HOSPADM

## 2022-03-01 RX ORDER — FLUTICASONE PROPIONATE 110 UG/1
2 AEROSOL, METERED RESPIRATORY (INHALATION) 2 TIMES DAILY
Refills: 2 | Status: DISCONTINUED | OUTPATIENT
Start: 2022-03-01 | End: 2022-03-02 | Stop reason: HOSPADM

## 2022-03-01 RX ORDER — CETIRIZINE HYDROCHLORIDE 10 MG/1
10 TABLET ORAL DAILY
Refills: 2 | Status: DISCONTINUED | OUTPATIENT
Start: 2022-03-01 | End: 2022-03-02 | Stop reason: HOSPADM

## 2022-03-01 RX ORDER — LOSARTAN POTASSIUM 100 MG/1
100 TABLET ORAL DAILY
Status: DISCONTINUED | OUTPATIENT
Start: 2022-03-01 | End: 2022-03-02 | Stop reason: HOSPADM

## 2022-03-01 RX ADMIN — ATORVASTATIN CALCIUM 10 MG: 10 TABLET, FILM COATED ORAL at 22:02

## 2022-03-01 RX ADMIN — CETIRIZINE HYDROCHLORIDE 10 MG: 10 TABLET ORAL at 15:14

## 2022-03-01 RX ADMIN — ALLOPURINOL 200 MG: 100 TABLET ORAL at 15:14

## 2022-03-01 RX ADMIN — FLUTICASONE PROPIONATE 1 SPRAY: 50 SPRAY, METERED NASAL at 15:14

## 2022-03-01 RX ADMIN — OLANZAPINE 10 MG: 10 TABLET, FILM COATED ORAL at 10:40

## 2022-03-01 RX ADMIN — LOSARTAN POTASSIUM 100 MG: 100 TABLET, FILM COATED ORAL at 15:14

## 2022-03-01 RX ADMIN — ASPIRIN 81 MG: 81 TABLET, CHEWABLE ORAL at 15:14

## 2022-03-01 ASSESSMENT — SLEEP AND FATIGUE QUESTIONNAIRES
RESTFUL SLEEP: NO
DIFFICULTY ARISING: YES
DO YOU USE A SLEEP AID: YES
DO YOU HAVE DIFFICULTY SLEEPING: YES
DIFFICULTY STAYING ASLEEP: YES
DIFFICULTY FALLING ASLEEP: YES
SLEEP PATTERN: DIFFICULTY FALLING ASLEEP;DIFFICULTY ARISING
AVERAGE NUMBER OF SLEEP HOURS: 3

## 2022-03-01 ASSESSMENT — PAIN SCALES - GENERAL: PAINLEVEL_OUTOF10: 0

## 2022-03-01 ASSESSMENT — LIFESTYLE VARIABLES: HISTORY_ALCOHOL_USE: YES

## 2022-03-01 ASSESSMENT — PAIN DESCRIPTION - PAIN TYPE: TYPE: OTHER (COMMENT)

## 2022-03-01 NOTE — FLOWSHEET NOTE
Purposeful Rounding    Patient concerns reported:None reported, patient currently resting in bed     Nurse made aware:N/A     Patient Turned and repositioned: Independent     Patient Toileted: Continent     Fall Precautions in Place: Yellow non-skid socks on      Electronically signed by Renae Martinez on 3/1/22 at 12:30 AM EST
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
## 2022-03-01 NOTE — GROUP NOTE
Group Therapy Note    Date: 3/1/2022    Group Start Time: 1100  Group End Time: 8667  Group Topic: [address], MSW        Group Therapy Note    Live Music & Reminiscence/Orientation    Attendees: 10         Notes:  Bruno Irwin did not attend morning group therapy sessions, observed resting in room.     Status After Intervention:  Unchanged    Participation Level: None    Discipline Responsible: Psychoeducational Specialist      Signature:  Gregory Singleton MM, MT-BC

## 2022-03-01 NOTE — PROGRESS NOTES
Called [phone] HR services to call pt in. Was on hold forever then disconnected. Then called back.  Left message for then to call us back and the the patient would be out for a few days and that he was at [facility] and requested a call back to our facility to confirm they got the message

## 2022-03-01 NOTE — PLAN OF CARE
Problem: Depressive Behavior With or Without Suicide Precautions:  Goal: Able to verbalize acceptance of life and situations over which he or she has no control  Description: Able to verbalize acceptance of life and situations over which he or she has no control  Outcome: Ongoing  Goal: Able to verbalize and/or display a decrease in depressive symptoms  Description: Able to verbalize and/or display a decrease in depressive symptoms  Outcome: Ongoing  Goal: Able to verbalize support systems  Description: Able to verbalize support systems  Outcome: Ongoing     Pt was tearful and pensive throughout shift, Finally got the correct number for his boss and left a message. Pt did not get out of bed today except to use the bathroom. Clarified it was his mother who passed away. He did not want to start Zoloft until tomorrow. Was irritable in late afternoon. He ate very little today. No active SI, no HI/AVH and no RTIS observed or heard.  His bosses number [phone]

## 2022-03-01 NOTE — PROGRESS NOTES
Behavioral Services  Medicare Certification Upon Admission    I certify that this patient's inpatient psychiatric hospital admission is medically necessary for:    [x] (1) Treatment which could reasonably be expected to improve this patient's condition,       [x] (2) Or for diagnostic study;     AND     [x](2) The inpatient psychiatric services are provided while the individual is under the care of a physician and are included in the individualized plan of care.     Estimated length of stay/service 4-5 days    Plan for post-hospital care [facility] or [facility] mental health    Electronically signed by Yaya Jett MD on 3/1/2022 at 3:48 PM

## 2022-03-01 NOTE — FLOWSHEET NOTE
22 1504   Psychiatric History   Psychiatric history treatment   (deniex all psych hx)   Are there any medication issues? No   Support System   Support system None  (\"No one gets close to me anymore. \")   Problems in support system Alienated/estranged; Lack of friends/family   Current Living Situation   Home Living Adequate   Living information Lives alone   Problems with living situation  No   Lack of basic needs No   SSDI/SSI SSI   Other government assistance denies   Problems with environment denies   Current abuse issues denies   Supervised setting None   Relationship problems No   Medical and Self-Care Issues   Relevant medical problems denies   Relevant self-care issues \"I just don't want to. \"   Barriers to treatment No   Family Constellation   Spouse/partner-name/age single   Children-names/ages denies   Parents parents are    Siblings 1 sibling, refused to provide further info   Support services   (N/A)   Childhood   Raised by Biological mother;Biological father   Biological mother refused to disclose   Biological father refused to disclose   Relevant family history \"I'm sure there was, but I care about me right now. \"   History of abuse No   Legal History   Legal history No   Juvenile legal history No    Abuse Assessment   Verbal Abuse Denies   Emotional abuse Denies   Financial Abuse Denies   Sexual abuse Denies   Elder abuse No   Substance Use   Use of substances  No   Motivation for SA Treatment   Stage of engagement   (N/A)   Motivation for treatment No   Current barriers to treatment   (N/A)   Education   Education HS graduate -GED   Special education   (N/A)   Work History   Currently employed No   Recent job loss or change   (denies)    service   (denies)   /VA involvement denies   Leisure/Activity   Past interests \"I don't do anything. I never have. \"   Present interests \"I don't do anything. I never have. \"   Current daily activity \"Nothing. My life is nothing. \"   Social with friends/family No   Cultural and Spiritual   Spiritual concerns No   Cultural concerns No     Completed by Ivania Bernal, MM, MT-BC

## 2022-03-01 NOTE — FLOWSHEET NOTE
Purposeful Rounding    Patient concerns reported:None noted    Nurse made aware:N/A    Patient Turned and repositioned: Independent    Patient Toileted: Continent    Fall Precautions in Place: Yellow non-skid socks on      Electronically signed by Ayla Chacon on 3/1/22 at 12:28 AM EST

## 2022-03-01 NOTE — H&P
Hospital Medicine History & Physical      PCP: Sana Rand MD    Date of Admission: 2/28/2022    Date of Service: Pt seen/examined on 3/1/2022     Chief Complaint:  Worsening depression, thoughts of self harm    History Of Present Illness: The patient is a 55 y.o. male with hypertension, Gout, Arthritis, h/o TIA, renal insufficiency, GERD, h/o prostate cancer who presented to [facility] ED with complaint of worsening depression, thoughts of self harm. Patient was seen and evaluated in the ED by the ED medical provider, patient was medically cleared for admission to [facility]. This note serves as an admission medical H&P.    PCP: Sana Rand MD  Tobacco use: never  ETOH use: occasional  Illicit drug use: denies    Patient denies any symptoms/complaints. Past Medical History:        Diagnosis Date    Arthritis     Bilateral renal cysts 6/3/2013    US   6/3/13     DJD (degenerative joint disease) 8/23/2011    Elevated uric acid in blood 5/22/2013    GERD (gastroesophageal reflux disease)     Gout 2013    HTN (hypertension) 5/22/2013    Hypertension     Movement disorder     gout; left knee along w/generallized arthritis    Prostate cancer ([facility] [address] 2/24/16    Renal insufficiency 6/1/2013    TIA (transient ischemic attack)        Past Surgical History:        Procedure Laterality Date    COLONOSCOPY  3/10/16    repeat 10 yrs    ELBOW SURGERY  2010    [facility]    ENDOSCOPY, COLON, DIAGNOSTIC      FOOT SURGERY  1999    left    PROSTATECTOMY  2016    [facility]    PROSTATECTOMY  3/28/16    robotic laparoscopic radical     WRIST SURGERY         Medications Prior to Admission:    Prior to Admission medications    Medication Sig Start Date End Date Taking?  Authorizing Provider   traZODone (DESYREL) 50 MG tablet Take 1 tablet by mouth nightly as needed for Sleep 2/15/22  Yes Sana Rand MD   atorvastatin (LIPITOR) 10 MG tablet Take 1 tablet by mouth nightly 2/15/22 Yes Stacy Burrell MD   losartan (COZAAR) 100 MG tablet Take 1 tablet by mouth daily 2/15/22  Yes Stacy Burrell MD   pantoprazole (PROTONIX) 40 MG tablet Take 1 tablet by mouth every morning (before breakfast) 2/15/22  Yes Stacy Burrell MD   allopurinol (ZYLOPRIM) 100 MG tablet Take 2 tablets by mouth daily 2/15/22  Yes Stacy Burrell MD   ASPIRIN LOW DOSE 81 MG chewable tablet CHEW ONE TABLET BY MOUTH DAILY 4/2/21  Yes Stacy Burrell MD   mometasone (ASMANEX, 30 METERED DOSES,) 110 MCG/INH AEPB Inhale 2 puffs into the lungs 2 times daily 1/26/21  Yes Stacy Burrell MD   loratadine (CLARITIN) 10 MG tablet Take 1 tablet by mouth daily 9/17/20  Yes Stacy Burrell MD   acetaminophen (TYLENOL) 325 MG tablet Take 650 mg by mouth every 6 hours as needed for Pain (last dose 8am)   Yes Historical Provider, MD   fluticasone (FLONASE) 50 MCG/ACT nasal spray 1 spray by Each Nostril route daily 9/17/20   Stacy Burrell MD   betamethasone dipropionate (DIPROLENE) 0.05 % ointment Apply to affected areas on the arms, chest and thighs twice daily for up to 2 weeks or until improved. 12/14/18   Stacy Burrell MD   triamcinolone (KENALOG) 0.1 % ointment Apply to affected areas on the chin twice daily for up to 2 weeks or until improved. 2/23/18   Stacy Burrell MD   sildenafil (VIAGRA) 50 MG tablet Take 50 mg by mouth as needed     Historical Provider, MD   hydrocortisone 2.5 % cream Apply topically 2 times daily. 11/4/15   Stacy Burrell MD       Allergies:  Aleve [naproxen sodium], Orellana, and Norvasc [amlodipine besylate]    Social History:    TOBACCO:   reports that he has never smoked. He has never used smokeless tobacco.  ETOH:   reports no history of alcohol use.       Family History:   Positive as follows:        Problem Relation Age of Onset    Lupus Mother     Stroke Mother     Other Mother         COPD       REVIEW OF SYSTEMS:       Constitutional: Negative for fever   HENT: Negative for sore throat   Respiratory: Negative  for dyspnea, cough   Cardiovascular: Negative for chest pain   Gastrointestinal: Negative for vomiting, diarrhea   Genitourinary: Negative for dysuria  Musculoskeletal: Negative for arthralgias   Skin: Negative for rash   Neurological: Negative for syncope   Psychiatric/Behavorial: per psychiatric evaluation    PHYSICAL EXAM:    BP (!) 130/91   Pulse 71   Temp 97.3 °F (36.3 °C) (Oral)   Resp 18   Ht 5' 9\" (1.753 m)   Wt (!) 304 lb (137.9 kg)   SpO2 96%   BMI 44.89 kg/m²     Gen: No distress. Alert. Eyes: PERRL. No sclera icterus. No conjunctival injection. ENT: No discharge. Pharynx clear. Neck:  Trachea midline. Resp: No accessory muscle use. No crackles. No wheezes. No rhonchi. CV: Regular rate. Regular rhythm. No murmur. No rub. No edema. GI: Non-tender. Non-distended. Normal bowel sounds. Skin: Warm and dry. No nodule on exposed extremities. No rash on exposed extremities. M/S: No cyanosis. No joint deformity. No clubbing. Neuro: Awake. No focal neurologic deficit on exam.  Cranial nerves II through XII intact. Patient is able to ambulate without difficulty. Psych: Per psychiatry team evaluation       CBC:   Recent Labs     02/28/22  0950   WBC 5.7   HGB 13.5   HCT 40.6   MCV 95.6        BMP:   Recent Labs     02/28/22  0950      K 4.8      CO2 25   BUN 19   CREATININE 1.4*     UA:  Recent Labs     02/28/22  0955   COLORU YELLOW   PHUR 6.5  6.5   WBCUA 1   RBCUA 5*   CLARITYU Clear   SPECGRAV 1.024   LEUKOCYTESUR Negative   UROBILINOGEN 1.0   BILIRUBINUR Negative   BLOODU TRACE*   GLUCOSEU Negative       CULTURES  COVID: not detected    EKG:  I have reviewed the EKG with the following interpretation:   Normal sinus rhythm, Possible Inferior infarct, age undetermined    RADIOLOGY  CXR 2/28/22  No radiographic evidence of acute pulmonary disease. ASSESSMENT/PLAN:  Renal insufficiency  CKD stage 3a  - stable. Hypertension  - BP stable.  Continue Losartan    H/o prostate cancer  - S/p surgery. Denies radiation    Gout  - stable. No acute flare  - continue Allopurinol. GERD  - on PPI    H/o TIA  - cont aspirin, Atorvastatin    Hyperlipidemia  - on Atorvastatin. Depression  - management per psychiatry     Pt has no medical complaints at this time. They were informed that should a medical concern arise during their admission they may have [facility] contact us.     Alexandra Leslie FNP-C  3/1/2022 11:36 AM

## 2022-03-01 NOTE — PROGRESS NOTES
`[facility] [place]  Admission Note     Admission Type:        Reason for admission:  Reason for Admission: suicidal ideation without intent or plan    PATIENT STRENGTHS:  Strengths: Medication Compliance    Patient Strengths and Limitations:       Addictive Behavior:   Addictive Behavior  In the past 3 months, have you felt or has someone told you that you have a problem with:  : None  Do you have a history of Chemical Use?: No  Do you have a history of Alcohol Use?: Yes (drinking a little bit more lateley, about every other day)  Do you have a history of Street Drug Abuse?: No  Histroy of Prescripton Drug Abuse?: No    Medical Problems:   Past Medical History:   Diagnosis Date    Arthritis     Bilateral renal cysts 6/3/2013    US   6/3/13     DJD (degenerative joint disease) 8/23/2011    Elevated uric acid in blood 5/22/2013    GERD (gastroesophageal reflux disease)     Gout 2013    HTN (hypertension) 5/22/2013    Hypertension     Movement disorder     gout; left knee along w/generallized arthritis    Prostate cancer ([facility] [address] 2/24/16    Renal insufficiency 6/1/2013    TIA (transient ischemic attack)        Status EXAM:  Status and Exam  Normal: No  Facial Expression: Avoids Gaze,Flat,Sad  Affect: Inappropriate (flat affect)  Level of Consciousness: Alert  Mood:Normal: No  Mood: Depressed,Sad  Motor Activity:Normal: Yes  Interview Behavior: Cooperative  Preception: [place] to Person,[place] to Time,[place] to Standard [place] to Place  Attention:Normal: No  Attention: Others(See comment) (withdrawn)  Thought Content:Normal: No  Thought Content: Other(See Comment) (depressed)  Hallucinations: None  Delusions: No  Memory:Normal: Yes  Insight and Judgment: No  Insight and Judgment: Unmotivated  Present Suicidal Ideation: No  Present Homicidal Ideation: No    Tobacco Screening:  Practical Counseling, on admission, teresa X, if applicable and completed (first 3 are required if patient doesn't refuse): ( )  Recognizing danger situations (included triggers and roadblocks)                    ( )  Coping skills (new ways to manage stress, exercise, relaxation techniques, changing routine, distraction)                                                           ( )  Basic information about quitting (benefits of quitting, techniques in how to quit, available resources  ( ) Referral for counseling faxed to [facility]                                           ( ) Patient refused counseling  ( ) Patient has not smoked in the last 30 days    Metabolic Screening:    Lab Results   Component Value Date    LABA1C 5.0 02/21/2020       No results for input(s): CHOL, TRIG, HDL, LDLCALC, LABVLDL in the last 72 hours. Body mass index is 44.89 kg/m². BP Readings from Last 2 Encounters:   02/28/22 123/81   02/28/22 (!) 151/95           Pt admitted with followings belongings:   cell phone, wallet, keys and other items that were already in sealed envelope. These items were placed in the safe on unit. Pair of pants, two shirts, socks and hat given to patient. Tennis shoes with strings, sweat pants with string placed in locker. Patient's home medications were reviewed with patient. Patient oriented to surroundings and program expectations and copy of patient rights given. Received admission packet:  yes. Consents reviewed, signed yes. Refused no. Patient verbalize understanding:  yes.     Patient education on precautions: yes                   Leon Webb RN

## 2022-03-01 NOTE — PROGRESS NOTES
Patient denies any suicidal intent. States was suicidal prior due to grieving from loss of loved one and they just buried her recently. Alert and oriented. Independent on ADLs. Oriented to room, unit. Denies any needs at this time.

## 2022-03-01 NOTE — PROGRESS NOTES
4 Eyes Skin Assessment     The patient is being assessed for  Admission    I agree that 2 RN's have performed a thorough Head to Toe Skin Assessment on the patient. ALL assessment sites listed below have been assessed. Areas assessed for pressure by both nurses  [x]   Head, Face, and Ears   [x]   Shoulders, Back, and Chest  [x]   Arms, Elbows, and Hands   [x]   Coccyx, Sacrum, and Ischum  [x]   Legs, Feet, and Heels                                Skin Assessed Under all Medical Devices by both nurses:  na              All Mepilex Borders were peeled back and area peeked at by both nurses:  na                 Does the Patient have Skin Breakdown related to pressure?   No              Gucci Prevention initiated:  NA   Wound Care Orders initiated:  NA      WOC nurse consulted for Pressure Injury (Stage 3,4, Unstageable, DTI, NWPT, Complex wounds)and New or Established Ostomies:  na         Nurse 1 eSignature: Electronically signed by Alin Shafer RN on 2/28/22 at 10:06 PM EST    **SHARE this note so that the co-signing nurse is able to place an eSignature**    Nurse 2 eSignature: Electronically signed by Sascha Almanzar RN on 2/28/22 at 10:07 PM EST

## 2022-03-02 VITALS
WEIGHT: 304 LBS | HEIGHT: 69 IN | SYSTOLIC BLOOD PRESSURE: 159 MMHG | RESPIRATION RATE: 16 BRPM | BODY MASS INDEX: 45.03 KG/M2 | DIASTOLIC BLOOD PRESSURE: 98 MMHG | OXYGEN SATURATION: 97 % | HEART RATE: 63 BPM | TEMPERATURE: 98.6 F

## 2022-03-02 PROBLEM — R45.851 SUICIDAL IDEATION: Status: RESOLVED | Noted: 2022-03-01 | Resolved: 2022-03-02

## 2022-03-02 LAB
ESTIMATED AVERAGE GLUCOSE: 119.8 MG/DL
HBA1C MFR BLD: 5.8 %

## 2022-03-02 PROCEDURE — 5130000000 HC BRIDGE APPOINTMENT

## 2022-03-02 PROCEDURE — 6370000000 HC RX 637 (ALT 250 FOR IP): Performed by: NURSE PRACTITIONER

## 2022-03-02 PROCEDURE — 99231 SBSQ HOSP IP/OBS SF/LOW 25: CPT | Performed by: NURSE PRACTITIONER

## 2022-03-02 PROCEDURE — 99238 HOSP IP/OBS DSCHRG MGMT 30/<: CPT | Performed by: PSYCHIATRY & NEUROLOGY

## 2022-03-02 PROCEDURE — 6370000000 HC RX 637 (ALT 250 FOR IP): Performed by: PSYCHIATRY & NEUROLOGY

## 2022-03-02 RX ADMIN — ASPIRIN 81 MG: 81 TABLET, CHEWABLE ORAL at 09:40

## 2022-03-02 RX ADMIN — ALLOPURINOL 200 MG: 100 TABLET ORAL at 09:39

## 2022-03-02 RX ADMIN — LOSARTAN POTASSIUM 100 MG: 100 TABLET, FILM COATED ORAL at 09:40

## 2022-03-02 RX ADMIN — PANTOPRAZOLE SODIUM 40 MG: 40 TABLET, DELAYED RELEASE ORAL at 06:48

## 2022-03-02 RX ADMIN — SERTRALINE HYDROCHLORIDE 50 MG: 50 TABLET ORAL at 09:40

## 2022-03-02 NOTE — PROGRESS NOTES
Senior Purposeful Rounding    Position:Back    Physical Environment:Room free from clutter, Clear path to bathroom  and No safety hazards noted    Pain Rating/ Nonverbal Pain Behaviors: Asleep    Pain interventions Attempted: N/A    Patient Toileted:Independent

## 2022-03-02 NOTE — PROGRESS NOTES
Progress Note    Admit Date:  2/28/2022    Subjective:  Mr. Uche Blanca is withdrawn. Denies any complaints. Discussed with RN. No medical concerns. Objective:   Patient Vitals for the past 4 hrs:   BP Temp Temp src Pulse Resp SpO2   03/02/22 0845 (!) 159/98 98.6 °F (37 °C) Oral 63 16 97 %        No intake or output data in the 24 hours ending 03/02/22 1116    Physical Exam:    Gen: No distress. Alert. Eyes: PERRL. No sclera icterus. No conjunctival injection. ENT: No discharge. Pharynx clear. Neck:  Trachea midline. Resp: No accessory muscle use. No crackles. No wheezes. No rhonchi. CV: Regular rate. Regular rhythm. No murmur.  No rub. No edema. GI: Non-tender. Non-distended. Normal bowel sounds. Skin: Warm and dry. No nodule on exposed extremities. No rash on exposed extremities. M/S: No cyanosis. No joint deformity. No clubbing. Neuro: Awake. No focal neurologic deficit on exam.  Cranial nerves II through XII intact.  Patient is able to ambulate without difficulty. Psych: Per psychiatry team evaluation     Data:  CBC:   Recent Labs     02/28/22  0950   WBC 5.7   HGB 13.5   HCT 40.6   MCV 95.6        BMP:   Recent Labs     02/28/22  0950      K 4.8      CO2 25   BUN 19   CREATININE 1.4*       CULTURES  COVID: not detected    RADIOLOGY  CXR 2/28/22  No radiographic evidence of acute pulmonary disease. Assessment/Plan:  Renal insufficiency  CKD stage 3a  - stable.      Hypertension  - BP stable. Continue Losartan     H/o prostate cancer  - S/p surgery. Denies radiation     Gout  - stable. No acute flare  - continue Allopurinol.      GERD  - on PPI     H/o TIA  - cont aspirin, Atorvastatin     Hyperlipidemia  - on Atorvastatin.      Depression  - management per psychiatry     Diet: ADULT DIET;  Regular  Code Status: Full Code    Ra Cuellar [facility]  3/2/2022

## 2022-03-02 NOTE — FLOWSHEET NOTE
Purposeful Rounding    Patient concerns reported:None reported.  Patient currently resting in bed.     Nurse made aware: N/A     Patient Turned and repositioned: Independent     Patient Toileted: Continent     Fall Precautions in Place: Yellow non-skid socks on      Electronically signed by Maggie Sinha on 3/2/22 at 4:20 AM EST

## 2022-03-02 NOTE — FLOWSHEET NOTE
Purposeful Rounding    Patient concerns reported:None reported. Patient currently resting in bed.     Nurse made aware: N/A    Patient Turned and repositioned: Independent    Patient Toileted: Continent    Fall Precautions in Place: Yellow non-skid socks on      Electronically signed by Sharif Stephenson on 3/1/22 at 10:50 PM EST

## 2022-03-02 NOTE — FLOWSHEET NOTE
Purposeful Rounding    Patient concerns reported:None reported.  Patient currently resting in bed.     Nurse made aware: N/A     Patient Turned and repositioned: Independent     Patient Toileted: Continent     Fall Precautions in Place: Yellow non-skid socks on    Electronically signed by Sujata Kimbrough on 3/2/22 at 2:40 AM EST

## 2022-03-02 NOTE — PROGRESS NOTES
[facility]  Discharge Note    Pt discharged with followings belongings:   Dental Appliances: None  Vision - Corrective Lenses: Other (Comment) (patient reports wears glasses but none noted in belongings.)  Hearing Aid: None  Jewelry: None  Body Piercings Removed: No  Clothing: Jacket / Binnie Grade (Comment) (shoes and sweat pants in locker)  Were All Patient Medications Collected?: Not Applicable  Other Valuables: Cell phone,Wallet,Keys (placed in safe)   Valuables sent home with patient. Patient education on aftercare instructions: Given Information faxed to [facility] by [facility]  at 4:53 PM .Patient verbalize understanding of AVS:  Yes.     Status EXAM upon discharge:  Status and Exam  Normal: No  Facial Expression: Avoids Gaze,Flat  Affect: Constricted  Level of Consciousness: Somnolent  Mood:Normal: No  Mood: Depressed,Despair  Motor Activity:Normal: No  Motor Activity: Decreased  Interview Behavior: Evasive (Not wanting to talk)  Preception:  (Declined to answer)  Attention:Normal: No  Attention: Unable to Concentrate  Thought Processes: Other(See comment) (OSMAN- not wanting to talk)  Thought Content:Normal: No  Thought Content: Poverty of Content  Hallucinations: None  Delusions: No (OSMAN-patient declined to talk)  Memory:Normal: No (OSMAN- patient declined to talk)  Memory:  (OSMAN- patient declined to talk)  Insight and Judgment: No  Insight and Judgment: Unmotivated  Present Suicidal Ideation: No  Present Homicidal Ideation: No      Metabolic Screening:    Lab Results   Component Value Date    LABA1C 5.8 03/01/2022       Lab Results   Component Value Date    CHOL 143 03/01/2022    CHOL 160 02/21/2020    CHOL 157 01/25/2019    CHOL 152 07/23/2017    CHOL 167 08/11/2016    CHOL 176 11/05/2015    CHOL 173 10/19/2015    CHOL 158 01/23/2015    CHOL 132 09/26/2014    CHOL 155 09/12/2014    CHOL 163 06/02/2013    CHOL 155 06/04/2012     Lab Results   Component Value Date    TRIG 81 03/01/2022    TRIG 71 02/21/2020    TRIG 62 01/25/2019    TRIG 114 07/23/2017    TRIG 69 08/11/2016    TRIG 58 11/05/2015    TRIG 62 10/19/2015    TRIG 50 01/23/2015    TRIG 48 09/26/2014    TRIG 66 09/12/2014    TRIG 108 06/02/2013    TRIG 110 06/04/2012     Lab Results   Component Value Date    HDL 44 03/01/2022    HDL 54 02/21/2020    HDL 48 01/25/2019    HDL 36 (L) 07/23/2017    HDL 51 08/11/2016    HDL 57 11/05/2015    HDL 55 10/19/2015    HDL 45 01/23/2015    HDL 41 09/26/2014    HDL 50 09/12/2014    HDL 41 06/02/2013    HDL 51 06/04/2012     No components found for: LDLCAL  Lab Results   Component Value Date    LABVLDL 16 03/01/2022    LABVLDL 14 02/21/2020    LABVLDL 12 01/25/2019    LABVLDL 12 11/05/2015    LABVLDL 10 01/23/2015    LABVLDL 13 09/12/2014    LABVLDL 22 06/02/2013    LABVLDL 22 06/04/2012     Bridge Appointment completed: Reviewed Discharge Instructions with patient. Patient verbalizes understanding and agreement with the discharge plan using the teachback method.      Referral for Outpatient Tobacco Cessation Counseling, upon discharge (teresa X if applicable and completed):    ( )  Hospital staff assisted patient to call Quit Line or faxed referral                                   during hospitalization                  ( )  Recognizing danger situations (included triggers and roadblocks), if not completed on admission                    ( )  Coping skills (new ways to manage stress, exercise, relaxation techniques, changing routine, distraction), if not completed on admission                                                           ( )  Basic information about quitting (benefits of quitting, techniques in how to quit, available resources, if not completed on admission  ( ) Referral for counseling [facility]ed to [facility]   (x ) Patient refused referral  ( ) Patient refused counseling  ( ) Patient refused smoking cessation medication upon discharge    Vaccinations (teresa X if applicable and completed):  ( ) Patient states already received influenza vaccine elsewhere  ( ) Patient received influenza vaccine during this hospitalization  ( x) Patient refused influenza vaccine at this time        Maggy Farris RN
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
## 2022-03-02 NOTE — PLAN OF CARE
Patient was regressed to his room & bed most of the shift. Patient denied having suicidal ideation, but declined to talk during 1:1. Patient with flat & depressed affect. Patient was upset that he had to change the hospital pants, that he had on, due to them having strings & the paper pants were too small. Staff was able to find a pair of pants, without strings.  Ruperto Sullivan R.N.

## 2022-03-02 NOTE — FLOWSHEET NOTE
Purposeful Rounding    Patient concerns reported:None reported.  Patient currently resting in bed.     Nurse made aware: N/A     Patient Turned and repositioned: Independent     Patient Toileted: Continent     Fall Precautions in Place: Yellow non-skid socks on        Electronically signed by Sharif Stephenson on 3/2/22 at 2:38 AM EST

## 2022-03-11 ENCOUNTER — OFFICE VISIT (OUTPATIENT)
Dept: FAMILY MEDICINE CLINIC | Age: 57
End: 2022-03-11
Payer: COMMERCIAL

## 2022-03-11 VITALS
DIASTOLIC BLOOD PRESSURE: 90 MMHG | OXYGEN SATURATION: 97 % | SYSTOLIC BLOOD PRESSURE: 140 MMHG | HEART RATE: 84 BPM | BODY MASS INDEX: 44.3 KG/M2 | WEIGHT: 300 LBS

## 2022-03-11 DIAGNOSIS — I10 HYPERTENSION, ESSENTIAL: ICD-10-CM

## 2022-03-11 DIAGNOSIS — F33.2 MAJOR DEPRESSIVE DISORDER, RECURRENT SEVERE WITHOUT PSYCHOTIC FEATURES (HCC): Primary | ICD-10-CM

## 2022-03-11 DIAGNOSIS — C61 PROSTATE CANCER (HCC): ICD-10-CM

## 2022-03-11 PROCEDURE — 99214 OFFICE O/P EST MOD 30 MIN: CPT | Performed by: FAMILY MEDICINE

## 2022-03-11 NOTE — PROGRESS NOTES
Malia Correia is a 55 y.o. male. HPI:  Here For follow-up after hospitalization/[facility]omenico Ward, for significant depression after his mother's death  Brought up all kinds of issues    Seems to be tolerating 50 mg Zoloft  On trazodone first    Seems somewhat better today not as tearful  Trying to  Find  outpatient counseling program  Program  [facility] referred him to was to expensive  Looking for something else, commend he uses one 800-number on the back of his insurance    FMLA forms completed    Meds, vitamins and allergies reviewed with pt    Wt Readings from Last 3 Encounters:   03/11/22 300 lb (136.1 kg)   02/28/22 (!) 304 lb (137.9 kg)   02/15/22 294 lb (133.4 kg)       REVIEW OF SYSTEMS:   CONSTITUTIONAL: See history of present illness,   Weight noted   HEENT: No new vision difficulties or ringing in the ears. RESPIRATORY: No new SOB, PND, orthopnea or cough. CARDIOVASCULAR: no CP, palpitations or SOB with exertion  GI: No nausea, vomiting, diarrhea, constipation, abdominal pain or changes in bowel habits. : No urinary frequency, urgency, incontinence hematuria or dysuria. SKIN: No cyanosis or skin lesions. MUSCULOSKELETAL: No new muscle or joint pain. NEUROLOGICAL: No syncope or TIA-like symptoms. PSYCHIATRIC: Significant anxiety and depression, states he is not suicidal    Allergies   Allergen Reactions    Aleve [Naproxen Sodium] Rash     \"pt takes aspirin at home\"    Orellana Swelling    Norvasc [Amlodipine Besylate] Other (See Comments)     Palpitations/chest pressure       Prior to Visit Medications    Medication Sig Taking?  Authorizing Provider   sertraline (ZOLOFT) 50 MG tablet Take 1 tablet by mouth daily Yes Pebbles Lopez MD   traZODone (DESYREL) 50 MG tablet Take 1 tablet by mouth nightly as needed for Sleep Yes Bailey Bravo MD   atorvastatin (LIPITOR) 10 MG tablet Take 1 tablet by mouth nightly Yes Bailey Bravo MD   losartan (COZAAR) 100 MG tablet Take 1 device    ASHLI 7= 16  PHQ-9=24    Lab Results   Component Value Date    CHOL 143 03/01/2022    CHOL 160 02/21/2020    CHOL 157 01/25/2019     Lab Results   Component Value Date    TRIG 81 03/01/2022    TRIG 71 02/21/2020    TRIG 62 01/25/2019     Lab Results   Component Value Date    HDL 44 03/01/2022    HDL 54 02/21/2020    HDL 48 01/25/2019     Lab Results   Component Value Date    LDLCHOLESTEROL 106 (H) 10/19/2015    LDLCALC 83 03/01/2022    LDLCALC 92 02/21/2020    LDLCALC 97 01/25/2019     Lab Results   Component Value Date    LABVLDL 16 03/01/2022    LABVLDL 14 02/21/2020    LABVLDL 12 01/25/2019    VLDL 14 08/11/2016     Lab Results   Component Value Date    CHOLHDLRATIO 3.3 08/11/2016       Lab Results   Component Value Date    LABA1C 5.8 03/01/2022     Lab Results   Component Value Date    .8 03/01/2022       ASSESSMENT/PLAN:  1. Major depressive disorder, recurrent severe without psychotic features (HCC)  Continue 50 mg of Zoloft  Find counseling, follow-up with Dr. Adeline Malik 2 weeks or as needed  Family forms completed today    2. Hypertension, essential  Healthy diet, exercise, continue blood pressure medic    3. Prostate cancer ([facility] [address]  Green  - PSA Screening;  Future      30 Total Minutes spent pre charting (reviewing problem list, meds, any test results, consultant and hospital notes ) and  obtaining present visit history, performing appropriate medical exam/evaluation, counseling and educating the patient (and family), ordering medications ,tests, and procedures as needed, refilling medication(s), placing referral(s) when needed in addition to coordinating care for this patient and documenting in electronic health record

## 2022-03-15 ENCOUNTER — FOLLOWUP TELEPHONE ENCOUNTER (OUTPATIENT)
Dept: PSYCHIATRY | Age: 57
End: 2022-03-15

## 2022-03-16 ENCOUNTER — HOSPITAL ENCOUNTER (OUTPATIENT)
Dept: PSYCHIATRY | Age: 57
Setting detail: THERAPIES SERIES
Discharge: HOME | End: 2022-03-16
Payer: COMMERCIAL

## 2022-03-16 DIAGNOSIS — I10 ESSENTIAL HYPERTENSION: ICD-10-CM

## 2022-03-16 DIAGNOSIS — E78.2 MIXED HYPERLIPIDEMIA: ICD-10-CM

## 2022-03-16 PROCEDURE — 90791 PSYCH DIAGNOSTIC EVALUATION: CPT

## 2022-03-16 RX ORDER — PANTOPRAZOLE SODIUM 40 MG/1
TABLET, DELAYED RELEASE ORAL
Qty: 90 TABLET | Refills: 3 | Status: SHIPPED | OUTPATIENT
Start: 2022-03-16

## 2022-03-16 RX ORDER — LOSARTAN POTASSIUM 100 MG/1
TABLET ORAL
Qty: 90 TABLET | Refills: 3 | Status: SHIPPED
Start: 2022-03-16 | End: 2022-05-10 | Stop reason: ALTCHOICE

## 2022-03-16 RX ORDER — ATORVASTATIN CALCIUM 10 MG/1
TABLET, FILM COATED ORAL
Qty: 90 TABLET | Refills: 3 | Status: SHIPPED | OUTPATIENT
Start: 2022-03-16

## 2022-03-16 ASSESSMENT — ANXIETY QUESTIONNAIRES
5. BEING SO RESTLESS THAT IT IS HARD TO SIT STILL: 3
4. TROUBLE RELAXING: 3
IF YOU CHECKED OFF ANY PROBLEMS ON THIS QUESTIONNAIRE, HOW DIFFICULT HAVE THESE PROBLEMS MADE IT FOR YOU TO DO YOUR WORK, TAKE CARE OF THINGS AT HOME, OR GET ALONG WITH OTHER PEOPLE: EXTREMELY DIFFICULT
GAD7 TOTAL SCORE: 19
3. WORRYING TOO MUCH ABOUT DIFFERENT THINGS: 3
2. NOT BEING ABLE TO STOP OR CONTROL WORRYING: 3
7. FEELING AFRAID AS IF SOMETHING AWFUL MIGHT HAPPEN: 1
6. BECOMING EASILY ANNOYED OR IRRITABLE: 3
1. FEELING NERVOUS, ANXIOUS, OR ON EDGE: 3

## 2022-03-16 ASSESSMENT — LIFESTYLE VARIABLES: HISTORY_ALCOHOL_USE: YES

## 2022-03-16 ASSESSMENT — SLEEP AND FATIGUE QUESTIONNAIRES
DIFFICULTY FALLING ASLEEP: YES
DIFFICULTY STAYING ASLEEP: YES
DIFFICULTY ARISING: YES
AVERAGE NUMBER OF SLEEP HOURS: 3
RESTFUL SLEEP: NO
DO YOU HAVE DIFFICULTY SLEEPING: YES
SLEEP PATTERN: DIFFICULTY FALLING ASLEEP;DIFFICULTY ARISING;RESTLESSNESS;NIGHTMARES/TERRORS

## 2022-03-16 ASSESSMENT — PATIENT HEALTH QUESTIONNAIRE - PHQ9: SUM OF ALL RESPONSES TO PHQ QUESTIONS 1-9: 19
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
## 2022-03-16 NOTE — TELEPHONE ENCOUNTER
Medication:   Requested Prescriptions     Pending Prescriptions Disp Refills    losartan (COZAAR) 100 MG tablet [Pharmacy Med Name: LOSARTAN POTASSIUM 100 MG TAB] 30 tablet      Sig: TAKE ONE TABLET BY MOUTH DAILY    pantoprazole (PROTONIX) 40 MG tablet [Pharmacy Med Name: PANTOPRAZOLE SOD DR 40 MG TAB] 30 tablet      Sig: TAKE ONE TABLET BY MOUTH EVERY MORNING BEFORE BREAKFAST    atorvastatin (LIPITOR) 10 MG tablet [Pharmacy Med Name: ATORVASTATIN 10 MG TABLET] 30 tablet      Sig: TAKE ONE TABLET BY MOUTH ONCE NIGHTLY       Last Filled:  02/15/2022 #[id number]    Patient Phone Number: [phone] (home) [phone] (work)    Last appt: 3/11/2022   Next appt: Visit date not found    Lab Results   Component Value Date     02/28/2022    K 4.8 02/28/2022     02/28/2022    CO2 25 02/28/2022    BUN 19 02/28/2022    CREATININE 1.4 (H) 02/28/2022    GLUCOSE 105 (H) 02/28/2022    CALCIUM 9.3 02/28/2022    PROT 6.4 02/21/2020    LABALBU 4.0 02/21/2020    BILITOT 1.5 (H) 02/21/2020    ALKPHOS 56 02/21/2020    AST 25 02/21/2020    ALT 19 02/21/2020    LABGLOM 53 (A) 02/28/2022    GFRAA >60 02/28/2022    AGRATIO 1.7 02/21/2020    GLOB 2.4 02/21/2020

## 2022-03-17 ENCOUNTER — FOLLOWUP TELEPHONE ENCOUNTER (OUTPATIENT)
Dept: PSYCHIATRY | Age: 57
End: 2022-03-17

## 2022-03-17 NOTE — TELEPHONE ENCOUNTER
Left VM for pt to see if he would be starting IOP on 3/18/2022. Pt needed to speak to financial aid about payments d/t deductible.

## 2022-03-18 ENCOUNTER — FOLLOWUP TELEPHONE ENCOUNTER (OUTPATIENT)
Dept: PSYCHIATRY | Age: 57
End: 2022-03-18

## 2022-03-18 ENCOUNTER — APPOINTMENT (OUTPATIENT)
Dept: PSYCHIATRY | Age: 57
End: 2022-03-18
Payer: COMMERCIAL

## 2022-03-18 NOTE — TELEPHONE ENCOUNTER
Pt given billing number [phone] to arrange a payment plan for IOP. Pt also given [place] [phone] referral phone number so his PCP can make an appointment for him.

## 2022-03-21 ENCOUNTER — HOSPITAL ENCOUNTER (OUTPATIENT)
Dept: PSYCHIATRY | Age: 57
Setting detail: THERAPIES SERIES
Discharge: HOME | End: 2022-03-21
Payer: COMMERCIAL

## 2022-03-21 VITALS
OXYGEN SATURATION: 95 % | TEMPERATURE: 97.1 F | DIASTOLIC BLOOD PRESSURE: 97 MMHG | RESPIRATION RATE: 17 BRPM | HEART RATE: 68 BPM | SYSTOLIC BLOOD PRESSURE: 150 MMHG

## 2022-03-21 DIAGNOSIS — F43.10 PTSD (POST-TRAUMATIC STRESS DISORDER): ICD-10-CM

## 2022-03-21 PROCEDURE — 90792 PSYCH DIAG EVAL W/MED SRVCS: CPT | Performed by: PSYCHIATRY & NEUROLOGY

## 2022-03-21 PROCEDURE — 90853 GROUP PSYCHOTHERAPY: CPT | Performed by: SOCIAL WORKER

## 2022-03-21 PROCEDURE — 90853 GROUP PSYCHOTHERAPY: CPT

## 2022-03-21 RX ORDER — MIRTAZAPINE 15 MG/1
15 TABLET, FILM COATED ORAL NIGHTLY
Qty: 30 TABLET | Refills: 0 | Status: SHIPPED | OUTPATIENT
Start: 2022-03-21 | End: 2022-03-29

## 2022-03-21 RX ORDER — SERTRALINE HYDROCHLORIDE 100 MG/1
100 TABLET, FILM COATED ORAL DAILY
Qty: 30 TABLET | Refills: 0 | Status: SHIPPED | OUTPATIENT
Start: 2022-03-21 | End: 2022-05-12

## 2022-03-21 NOTE — GROUP NOTE
Group Therapy Note    Date: 3/21/2022    Group Start Time: 10:15 AM  Group End Time: 11:00 AM  Group Topic: Psychoeducation    [facility]    [address]        Group Therapy Note    Attendees: 9    Participants learned about the stages of grief and completed a grief exercise handout. Notes:  Britney Stubbs attended group and was engaged throughout but did not join in group participation. Status After Intervention:  Unchanged    Participation Level: Active Listener    Participation Quality: Attentive      Speech:  mute      Thought Process/Content: Logical      Affective Functioning: Flat      Mood: Unknown due to patient not speaking during group.       Level of consciousness:  Alert and Attentive      Response to Learning: Resistant      Endings: None Reported    Modes of Intervention: Education and Activity      Discipline Responsible: /Counselor      Signature:  [address]

## 2022-03-21 NOTE — GROUP NOTE
iop                                                                        Group Therapy Note    Date: 3/21/2022    Group Start Time:  8:30 AM  Group End Time:  9:50 AM  Group Topic: Psychotherapy    [facility]        Group Therapy Note    Attendees: 8    Group members engaged in Psychotherapy agenda group; identifying personal agenda for group and engaging in group discussion of the Here and Now to create interpersonal connections among/between peers. Notes: Today was Keyon's first day of program. He was mostly quiet and displayed some difficulty identifying an agenda for group. He seemed engaged in active listening and was able to offer peers feedback with prompting and encouragement. He seems to struggle with building trust with others and engaging in open interpersonal communications with others. He did acknowledge that the group process was not as intimidating as he anticipated. Status After Intervention:  Improved    Participation Level:  Active Listener    Participation Quality: Appropriate, Attentive and Sharing (with encouragement)      Speech:  hesitant      Thought Process/Content: Logical  Linear      Affective Functioning: Constricted/Restricted      Mood: anxious      Level of consciousness:  Alert, Oriented x4 and Attentive      Response to Learning: Able to verbalize current knowledge/experience and Progressing to goal      Endings: None Reported    Modes of Intervention: Support, Exploration and Clarifying      Discipline Responsible: Registered and Board Certified Art Therapist      Signature:  Fabien Irvin MS, ATR-BC
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
## 2022-03-22 NOTE — BH NOTE
Outpatient Treatment Team Progress Note  Date of Review: 3/22/22      Multidisciplinary Outpatient Treatment Team met to discuss and review patient's progress in group and individual therapy sessions. Presenting Problem: Depression, Anxiety, PTSD    Patient's Current Treatment Status: Working on guilt, grief and positive coping skills related to alcohol use.     Treatment Start Date: 3/21/22    Treatment Step-Down Date: 4/15/22    Tentative Discharge Date: 4/15/22     [address]

## 2022-03-24 ENCOUNTER — HOSPITAL ENCOUNTER (OUTPATIENT)
Dept: PSYCHIATRY | Age: 57
Setting detail: THERAPIES SERIES
Discharge: HOME | End: 2022-03-24
Payer: COMMERCIAL

## 2022-03-24 DIAGNOSIS — F33.2 MAJOR DEPRESSIVE DISORDER, RECURRENT SEVERE WITHOUT PSYCHOTIC FEATURES (HCC): ICD-10-CM

## 2022-03-24 PROCEDURE — 90853 GROUP PSYCHOTHERAPY: CPT | Performed by: SOCIAL WORKER

## 2022-03-24 PROCEDURE — 90853 GROUP PSYCHOTHERAPY: CPT

## 2022-03-24 RX ORDER — MIRTAZAPINE 15 MG/1
15 TABLET, FILM COATED ORAL NIGHTLY
Qty: 30 TABLET | Refills: 0 | Status: SHIPPED | OUTPATIENT
Start: 2022-03-24 | End: 2022-05-11

## 2022-03-24 NOTE — GROUP NOTE
Group Therapy Note    Date: 3/24/2022    Group Start Time: 11:25 AM  Group End Time: 12:00 PM  Group Topic: Psychoeducation    [facility]    [address]        Group Therapy Note    Attendees: 9    Participants learned and practiced positive coping skills. Notes:  Elli Haynes attended group and was attentive and talked about coping skills that have worked for him and he practiced the abdominal deep breathing skill. Status After Intervention:  Improved    Participation Level:  Active Listener and Interactive    Participation Quality: Appropriate and Attentive      Speech:  normal      Thought Process/Content: Logical      Affective Functioning: Congruent      Mood: euthymic      Level of consciousness:  Alert, Oriented x4 and Attentive      Response to Learning: Able to verbalize current knowledge/experience, Able to verbalize/acknowledge new learning, Capable of insight and Progressing to goal      Endings: None Reported    Modes of Intervention: Education and Problem-solving      Discipline Responsible: /Counselor      Signature:  [address]

## 2022-03-24 NOTE — GROUP NOTE
Group Therapy Note    Date: 3/24/2022    Group Start Time: 10:10 AM  Group End Time: 11:10 AM  Group Topic: Psychoeducation    [facility]    JENNIFFER Dhillon        Group Therapy Note  Clinician introduced healthy ways to structure their daily routines. The group members listed their daily tasks and shared them with the group. They were able to take turns identifying tasks that they can improve by replacing with new or healthier tasks. The clinician had the group practice using grounding techniques and focus on being mindful in the mundane daily tasks to have a healthier routine. Attendees: 9         Patient's Goal:      Notes:  Patient was cooperative and engaged in the discussion and activity. He was able to verbalize the tasks on his daily schedule that he wanted to change to make a healthier schedule. He received validation from other group members. He was quiet but fully engaged in the activity. Status After Intervention:  Improved    Participation Level:  Active Listener    Participation Quality: Appropriate and Attentive      Speech:  normal      Thought Process/Content: Logical      Affective Functioning: Congruent      Mood: depressed      Level of consciousness:  Attentive      Response to Learning: Able to verbalize/acknowledge new learning      Endings: None Reported    Modes of Intervention: Education, Exploration and Activity      Discipline Responsible: /Counselor      Signature:  JENNIFFER Dhillon

## 2022-03-25 ENCOUNTER — HOSPITAL ENCOUNTER (OUTPATIENT)
Dept: PSYCHIATRY | Age: 57
Setting detail: THERAPIES SERIES
Discharge: HOME | End: 2022-03-25
Payer: COMMERCIAL

## 2022-03-25 DIAGNOSIS — F43.10 PTSD (POST-TRAUMATIC STRESS DISORDER): ICD-10-CM

## 2022-03-25 DIAGNOSIS — F33.2 MAJOR DEPRESSIVE DISORDER, RECURRENT SEVERE WITHOUT PSYCHOTIC FEATURES (HCC): ICD-10-CM

## 2022-03-25 PROCEDURE — 90853 GROUP PSYCHOTHERAPY: CPT | Performed by: SOCIAL WORKER

## 2022-03-25 PROCEDURE — 90853 GROUP PSYCHOTHERAPY: CPT

## 2022-03-25 NOTE — GROUP NOTE
Group Therapy Note    Date: 3/25/2022    Group Start Time:  8:30 AM  Group End Time:  9:50 AM  Group Topic: Psychotherapy    [facility]        Group Therapy Note    Attendees: 10    Group members engaged in an agenda setting psychotherapy group; identifying personal goal and using interpersonal communications and dialogue to create connections and build cohesion among group peers. Notes:  Ashia Villar continues to struggle with sharing within the large group. He will offer some verbal engagement when prompted but this is limited. His peers are supportive of his input and stated they will give him group space when he is ready to talk. Ashia Villar seemed reassured by this. He was able to state that his goal was \"learning how to deal with feelings of anger, depression and guilt (it's a lot of things)\". The group was supportive of this. Status After Intervention:  Unchanged    Participation Level:  Active Listener and Minimal    Participation Quality: Appropriate, Attentive and Sharing (minimal)      Speech:  hesitant      Thought Process/Content: Logical  Linear      Affective Functioning: Flat and Constricted/Restricted      Mood: depressed      Level of consciousness:  Alert, Oriented x4 and Attentive      Response to Learning: Able to verbalize current knowledge/experience, Able to retain information and Progressing to goal      Endings: None Reported    Modes of Intervention: Support, Exploration, Clarifying and Problem-solving      Discipline Responsible: Registered and Board Certified Art Therapist      Signature:  Rad Finney MS, ATR-BC

## 2022-03-28 ENCOUNTER — HOSPITAL ENCOUNTER (OUTPATIENT)
Dept: PSYCHIATRY | Age: 57
Setting detail: THERAPIES SERIES
Discharge: HOME | End: 2022-03-28
Payer: COMMERCIAL

## 2022-03-28 NOTE — BH NOTE
Pt left VM that he would not be in for IOP today d/t back pain. Pt states he has a doctor's appt tomorrow. This will serve as pt's first absence.

## 2022-03-29 ENCOUNTER — OFFICE VISIT (OUTPATIENT)
Dept: FAMILY MEDICINE CLINIC | Age: 57
End: 2022-03-29
Payer: COMMERCIAL

## 2022-03-29 VITALS
BODY MASS INDEX: 44.88 KG/M2 | SYSTOLIC BLOOD PRESSURE: 130 MMHG | HEART RATE: 75 BPM | WEIGHT: 303 LBS | DIASTOLIC BLOOD PRESSURE: 88 MMHG | OXYGEN SATURATION: 96 % | HEIGHT: 69 IN

## 2022-03-29 DIAGNOSIS — Z23 NEED FOR COVID-19 VACCINE: ICD-10-CM

## 2022-03-29 DIAGNOSIS — R29.898 DECREASED ROM OF NECK: ICD-10-CM

## 2022-03-29 DIAGNOSIS — F33.2 MAJOR DEPRESSIVE DISORDER, RECURRENT SEVERE WITHOUT PSYCHOTIC FEATURES (HCC): ICD-10-CM

## 2022-03-29 DIAGNOSIS — M25.69 DECREASED ROM OF TRUNK AND BACK: Primary | ICD-10-CM

## 2022-03-29 DIAGNOSIS — G47.9 SLEEP DIFFICULTIES: ICD-10-CM

## 2022-03-29 DIAGNOSIS — R06.83 SNORES: ICD-10-CM

## 2022-03-29 DIAGNOSIS — I10 PRIMARY HYPERTENSION: ICD-10-CM

## 2022-03-29 PROCEDURE — 99214 OFFICE O/P EST MOD 30 MIN: CPT | Performed by: FAMILY MEDICINE

## 2022-03-29 NOTE — PROGRESS NOTES
Mike Garner is a 55 y.o. male. HPI:  Here For follow-up of depression  Seeing psychiatry and also in group sessions, helping, yesterday was a bad day however    M-TH-Friday our dates of his group session    Complaining of feeling stiff and achy  Decreased range of motion of neck and back with discomfort  No radiating pain down the arms or leg, with tightness neck and back and legs  Weight gain noted    States his mattress is fairly new    Meds, vitamins and allergies reviewed with pt    Wt Readings from Last 3 Encounters:   03/29/22 (!) 303 lb (137.4 kg)   03/11/22 300 lb (136.1 kg)   02/28/22 (!) 304 lb (137.9 kg)       REVIEW OF SYSTEMS:   CONSTITUTIONAL: See history of present illness,   Weight noted   HEENT: No new vision difficulties or ringing in the ears. RESPIRATORY: No new SOB, PND, orthopnea or cough. CARDIOVASCULAR: no CP, palpitations or SOB with exertion  GI: No nausea, vomiting, diarrhea, constipation, abdominal pain or changes in bowel habits. : No urinary frequency, urgency, incontinence hematuria or dysuria. SKIN: No cyanosis or skin lesions. MUSCULOSKELETAL: No new muscle or joint pain. NEUROLOGICAL: No syncope or TIA-like symptoms. PSYCHIATRIC: Struggling with anxiety and depression, in treatment/counseling sessions, on med    Allergies   Allergen Reactions    Aleve [Naproxen Sodium] Rash     \"pt takes aspirin at home\"    Orellana Swelling    Norvasc [Amlodipine Besylate] Other (See Comments)     Palpitations/chest pressure       Prior to Visit Medications    Medication Sig Taking?  Authorizing Provider   mirtazapine (REMERON) 15 MG tablet Take 1 tablet by mouth nightly Yes Staci Kwok MD   sertraline (ZOLOFT) 100 MG tablet Take 1 tablet by mouth daily Yes Staci Kwok MD   losartan (COZAAR) 100 MG tablet TAKE ONE TABLET BY MOUTH DAILY Yes Jim Mina MD   pantoprazole (PROTONIX) 40 MG tablet TAKE ONE TABLET BY MOUTH EVERY MORNING BEFORE BREAKFAST Yes Rosa Maria LIND Anibal Thapa MD   atorvastatin (LIPITOR) 10 MG tablet TAKE ONE TABLET BY MOUTH ONCE NIGHTLY Yes Sky Feng MD   traZODone (DESYREL) 50 MG tablet Take 1 tablet by mouth nightly as needed for Sleep Yes Sky Feng MD   allopurinol (ZYLOPRIM) 100 MG tablet Take 2 tablets by mouth daily Yes Sky Feng MD   ASPIRIN LOW DOSE 81 MG chewable tablet CHEW ONE TABLET BY MOUTH DAILY Yes Sky Feng MD   mometasone (ASMANEX, 30 METERED DOSES,) 110 MCG/INH AEPB Inhale 2 puffs into the lungs 2 times daily Yes Sky Feng MD   loratadine (CLARITIN) 10 MG tablet Take 1 tablet by mouth daily Yes Sky Feng MD   acetaminophen (TYLENOL) 325 MG tablet Take 650 mg by mouth every 6 hours as needed for Pain (last dose 8am) Yes Historical Provider, MD       Past Medical History:   Diagnosis Date    Arthritis     Bilateral renal cysts 6/3/2013    US   6/3/13     DJD (degenerative joint disease) 8/23/2011    Elevated uric acid in blood 5/22/2013    GERD (gastroesophageal reflux disease)     Gout 2013    HTN (hypertension) 5/22/2013    Hypertension     Movement disorder     gout; left knee along w/generallized arthritis    Prostate cancer ([facility] [address] 2/24/16    Renal insufficiency 6/1/2013    TIA (transient ischemic attack)        Social History     Tobacco Use    Smoking status: Never Smoker    Smokeless tobacco: Never Used   Substance Use Topics    Alcohol use: No       Family History   Problem Relation Age of Onset    Lupus Mother     Stroke Mother     Other Mother         COPD       OBJECTIVE:  /88   Pulse 75   Ht 5' 9\" (1.753 m)   Wt (!) 303 lb (137.4 kg)   SpO2 96%   BMI 44.75 kg/m²   GEN: Quiet but better eye contact today  HEENT:  NCAT, TMs:normal and throat: clear  NECK: Decreased range of motion in all directions stiffness  CV:  Regular rate and rhythm, S1 and S2 normal, no murmurs, clicks  PULM:  Chest is clear, no wheezing ,  symmetric air entry throughout both lung fields.   ABD: Soft, NT obese, BMI noted  Back with decreased range of motion in all directions/stiff  EXT: No rash or edema  NEURO: Alert oriented ×3, tight hamstrings, decreased range of motion neck and back    Most recent labs reviewed with patient indeed    ASHLI 7= 19    PHQ-9= 24     ASSESSMENT/PLAN:  1. Decreased ROM of trunk and back  Trial of physical therapy to improve overall stiffness  Using Tylenol  - [facility]  - [place] [facility]    2. Decreased ROM of neck  Trial of physical therapy to improve overall stiffness  Using Tylenol  - [facility]  - [place] [facility]    3. Sleep difficulties  [facility] sleep specialist  [facility]    4. Snores  Refer to sleep specialist  [facility]    5. Primary hypertension  Mildly elevated blood pressure, secondary likely to weight gain and if he has sleep apnea  Screen for sleep apnea  [facility]    6. Need for COVID-19 vaccine  Recommend Covid booster now    7.   Adjustments disorder with anxiety and depression  Continue Zoloft 100 mg in the morning  Continue Remeron at bedtime  Follow-up with psych and counseling sessions  Follow-up 1 month or as needed with Dr. Encinas Barrier      32 Total Minutes spent pre charting (reviewing problem list, meds, any test results, consultant and hospital notes ) and  obtaining present visit history, performing appropriate medical exam/evaluation, counseling and educating the patient (and family), ordering medications ,tests, and procedures as needed, refilling medication(s), placing referral(s) when needed in addition to coordinating care for this patient and documenting in electronic health record

## 2022-03-31 ENCOUNTER — HOSPITAL ENCOUNTER (OUTPATIENT)
Dept: PSYCHIATRY | Age: 57
Setting detail: THERAPIES SERIES
Discharge: HOME | End: 2022-03-31
Payer: COMMERCIAL

## 2022-03-31 DIAGNOSIS — F43.23 ADJUSTMENT DISORDER WITH MIXED ANXIETY AND DEPRESSED MOOD: ICD-10-CM

## 2022-03-31 DIAGNOSIS — F33.2 MAJOR DEPRESSIVE DISORDER, RECURRENT SEVERE WITHOUT PSYCHOTIC FEATURES (HCC): ICD-10-CM

## 2022-03-31 PROCEDURE — 90853 GROUP PSYCHOTHERAPY: CPT | Performed by: SOCIAL WORKER

## 2022-03-31 PROCEDURE — 90853 GROUP PSYCHOTHERAPY: CPT

## 2022-03-31 NOTE — GROUP NOTE
Group Therapy Note    Date: 3/31/2022    Group Start Time: 11:00 AM  Group End Time: 12:00 PM  Group Topic: Psychoeducation    [facility]    JENNIFFER Sparks        Group Therapy Note  Clinician introduced the DBT skills ACCEPT to the group to help them distract while in crisis state. The group members took notes and clinician provided a hand out. The clinician played a short video clip of the skills and definition of each one. The members took notes of ideas and examples from the clip, the handout and the group activity to add to their stress distolerance skills. Attendees: 9         Patient's Goal:      Notes:  Patient was cooperative and fully engaged in the group discussion and activity. He took notes and was responsive and validating to other group members. Status After Intervention:  Improved    Participation Level:  Active Listener    Participation Quality: Appropriate and Attentive      Speech:  normal      Thought Process/Content: Logical      Affective Functioning: Congruent and Flat      Mood: depressed      Level of consciousness:  Oriented x4      Response to Learning: Able to verbalize/acknowledge new learning      Endings: None Reported    Modes of Intervention: Education, Socialization, Exploration and Activity      Discipline Responsible: /Counselor      Signature:  JENNIFFER Sparks

## 2022-03-31 NOTE — GROUP NOTE
Group Therapy Note    Date: 3/31/2022    Group Start Time: 10:10 AM  Group End Time: 11:00 AM  Group Topic: Psychoeducation    [facility]    MAREK Castillo        Group Therapy Note    Attendees: 10    Participants learned about Courage, Vulnerability and Strength: How Therapy Empowers Us by Marguerite Winchester. Rosie Stevens. We had group discussion about the article and participants were given homework to complete a journal writing prompt about courage to bring in with them tomorrow for discussion. Notes:  Swathi Cage attended group and was engaged and attentive throughout but he did not engage in group discussion. Status After Intervention:  Improved    Participation Level:  Active Listener    Participation Quality: Appropriate and Attentive      Speech:  mute      Thought Process/Content: Logical      Affective Functioning: Congruent      Mood: euthymic      Level of consciousness:  Alert, Oriented x4 and Attentive      Response to Learning: Progressing to goal      Endings: None Reported    Modes of Intervention: Education and Exploration      Discipline Responsible: /Counselor      Signature:  [address]
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
## 2022-04-01 ENCOUNTER — HOSPITAL ENCOUNTER (OUTPATIENT)
Dept: PSYCHIATRY | Age: 57
Setting detail: THERAPIES SERIES
Discharge: HOME | End: 2022-04-01
Payer: COMMERCIAL

## 2022-04-01 DIAGNOSIS — F33.2 MAJOR DEPRESSIVE DISORDER, RECURRENT SEVERE WITHOUT PSYCHOTIC FEATURES (HCC): ICD-10-CM

## 2022-04-01 PROCEDURE — 90853 GROUP PSYCHOTHERAPY: CPT

## 2022-04-01 PROCEDURE — 90853 GROUP PSYCHOTHERAPY: CPT | Performed by: SOCIAL WORKER

## 2022-04-01 NOTE — GROUP NOTE
Group Therapy Note    Date: 4/1/2022    Group Start Time:  8:30 AM  Group End Time:  9:45 AM  Group Topic: Psychotherapy    [facility]        Group Therapy Note    Attendees: 9         Patient's Goal:  Patient will set agenda for engagement in psychotherapy, use structure of group dynamics to explore behaviors in healthy communication and relationships. Notes:  Jeanna Goodson reported at start of session that he felt \"on the edge of lots of emotions\", was unable to specify. Jeanna Goodson was present during session, did not engage in discussions despite prompts to engage from peers and facilitator.      Status After Intervention:  Unchanged    Participation Level: Minimal    Participation Quality: Attentive      Speech:  hesitant      Thought Process/Content: Linear      Affective Functioning: Congruent      Mood: euthymic      Level of consciousness:  Alert and Attentive      Response to Learning: Capable of insight and Progressing to goal      Endings: None Reported    Modes of Intervention: Support, Socialization, Exploration, Clarifying and Problem-solving      Discipline Responsible: Psychoeducational Specialist      Signature:  Beck Patel MM, MT-BC

## 2022-04-01 NOTE — GROUP NOTE
Group Therapy Note    Date: 4/1/2022    Group Start Time: 11:15 AM  Group End Time: 12:00 PM  Group Topic: Psychoeducation    [facility]    [address]        Group Therapy Note    Attendees: 9    Participants shared their journal prompts about courage and vulnerability and group discussion followed. Notes:  Triston Cast attended group but did not participate in the journal prompt homework. He was quiet throughout group.      Status After Intervention:  Unchanged    Participation Level: Minimal    Participation Quality: Appropriate and Attentive      Speech:  mute      Thought Process/Content: Logical      Affective Functioning: Congruent      Mood: euthymic      Level of consciousness:  Alert and Attentive      Response to Learning: Progressing to goal      Endings: None Reported    Modes of Intervention: Education and Support      Discipline Responsible: /Counselor      Signature:  [address]

## 2022-04-01 NOTE — GROUP NOTE
Group Therapy Note    Date: 4/1/2022    Group Start Time: 10:00 AM  Group End Time: 11:00 AM  Group Topic: Psychoeducation    KITA Espitia, MSW        Group Therapy Note  Clinician introduced the group stages, forming, storming, norming, performing and adjourning for them to be able to see how the group traveled through the stages. Clinician discussed the groups journey through the stages and the members were able to recognize they are in the adjourning stage with many member leaving this week and next week. The new members are trying to fit in and storming in the process. The clinician educated the group on the process and initiated ground rules of only talking with a talking stick and staying on topic for \"this\" group. The clinician introduced the stages of grief and had each member identify their \"grief\". Once they identified their grief, they completed a grief sentence completion and kept it private. The clinician provided a handout of the 5 stages of grief that the members took turns reading. Members asked questions about each stage of grief and how to move on from each stage. Clinician provided CBT, Grounding, Relaxation, Radical Acceptance and many other skills that the group has learned to address all of the questions and to help them move on. Attendees: 9         Patient's Goal:      Notes:  Patient was cooperative and engaged in the group discussions and activities. He was quiet and did not share during questions and answer time. He did embrace the quiet time in the group. Status After Intervention:  Improved    Participation Level:  Active Listener    Participation Quality: Appropriate and Attentive      Speech:  hesitant      Thought Process/Content: Linear      Affective Functioning: Blunted      Mood: depressed      Level of consciousness:  Attentive      Response to Learning: Able to verbalize/acknowledge new learning      Endings: None Reported    Modes of Intervention: Education, Exploration and Activity      Discipline Responsible: /Counselor      Signature:  JENNIFFER Camejo

## 2022-04-04 ENCOUNTER — HOSPITAL ENCOUNTER (OUTPATIENT)
Dept: PSYCHIATRY | Age: 57
Setting detail: THERAPIES SERIES
Discharge: HOME | End: 2022-04-04
Payer: COMMERCIAL

## 2022-04-04 DIAGNOSIS — F33.2 MAJOR DEPRESSIVE DISORDER, RECURRENT SEVERE WITHOUT PSYCHOTIC FEATURES (HCC): ICD-10-CM

## 2022-04-04 PROCEDURE — 90853 GROUP PSYCHOTHERAPY: CPT

## 2022-04-04 NOTE — BH NOTE
Outpatient Treatment Team Progress Note  Date of Review: 3/29/22        Multidisciplinary Outpatient Treatment Team met to discuss and review patient's progress in group and individual therapy sessions. Presenting Problem: Depression, Anxiety, PTSD     Patient's Current Treatment Status: America Burnett continues making progress toward increased engagement in group therapy sessions, exhibiting increased positive self-disclosure. America Burnett has received feedback and validation from peers for positive progress, will benefit from continued opportunity for self-disclosure and development of positive peer-supportive relationships in treatment.      Treatment Start Date: 3/21/22     Treatment Step-Down Date: N/A     Tentative Discharge Date: 4/29/22     Lorena Smith MM, MT-BC

## 2022-04-04 NOTE — GROUP NOTE
Group Therapy Note    Date: 4/4/2022    Group Start Time:  8:30 AM  Group End Time:  9:45 AM  Group Topic: Psychotherapy    [facility]      Group Therapy Note    Attendees: 9    Group members engaged in [place] setting Psychotherapy group; identifying a personal goal to develop through the use of interpersonal group interactions and dialogue. Patient's Goal:  \"Not sure what I need from the group but I am experiencing recurring nightmares and feeling bad. \"    Notes:   Jennifer Rosado is able to use non-verbal interactions to show he is connected to group members. He struggles with offering verbal interactions during group discussions and mostly continues to remain quiet. was able to interact with group peers in dialogue. Status After Intervention:  Improved (it appears Keyon's motivation is increasing although his group verbal participation remains impoverished)    Participation Level:  Active Listener    Participation Quality: Appropriate, Attentive and Supportive      Speech:  hesitant      Thought Process/Content: Logical  Perseverating      Affective Functioning: Constricted/Restricted      Mood: anxious and depressed (decreased)      Level of consciousness:  Alert, Oriented x4 and Attentive      Response to Learning: Able to verbalize current knowledge/experience, Able to retain information and Progressing to goal      Endings: None Reported    Modes of Intervention: Support, Exploration and Clarifying      Discipline Responsible: Registered and Board Certified Art Therapist      Signature:  Dean Resendez MS, ATR-BC

## 2022-04-04 NOTE — GROUP NOTE
Group Therapy Note     Date: 4/4/2022     Group Start Time: 11:15 AM  Group End Time: 12:15 PM  Group Topic: Recreational     [facility]    Edwin Hankinsx           Group Therapy Note     Topic: [place] [address] Something From Nothing     Facilitator provided group members 3 papers, instructed members to draw 3 lines on each paper. Group members then distributed their papers to peers, each member receiving 3 before turning each page into a new image, incorporating the 3 original lines into the created image. Group concluded with members processing purpose of experience, use of healthy distraction and leisure as play. Attendees: 9           Notes: This pt was present and actively engaged across time allotted, working on three-line drawings throughout time allotted. Collaborated with peers in discussion of the creative experience, relating activity to narrative of \"creating something from nothing\", using creative art and improvised creativity as healthy distraction. At close of session, this pt expressed their hopes and admiration for a departing group member, tearful in disclosure while acknowledging positive peer support. Status After Intervention:  Improved     Participation Level:  Active Listener and Interactive     Participation Quality: Appropriate and Attentive        Speech:  normal        Thought Process/Content: Logical        Affective Functioning: Congruent        Mood: euthymic        Level of consciousness:  Alert and Oriented x4        Response to Learning: Able to verbalize/acknowledge new learning, Capable of insight and Progressing to goal        Endings: None Reported     Modes of Intervention: Exploration, Activity and Media        Discipline Responsible: Psychoeducational Specialist        Signature:  Lexie Khan MM, MT-BC

## 2022-04-04 NOTE — GROUP NOTE
Group Therapy Note    Date: 4/4/2022    Group Start Time: 10:00 AM  Group End Time: 10:45 AM  Group Topic: Psychoeducation    [facility]    JENNIFFER Camejo        Group Therapy Note  Clinician introduced a Mandala activity where they put themself in the center and surround themselves with things that are important to them. Once they had the visual of where they fit in their universe, the clinician introduced several worksheets to clarify there values and implementing steps and goals to make healthy life changes. Attendees: 9         Patient's Goal:      Notes:  Patient was cooperative and fully engaged in the group activities. He completed the goals sheet for Love, Work, Play and Health. Status After Intervention:  Unchanged    Participation Level:  Active Listener    Participation Quality: Attentive      Speech:  mute      Thought Process/Content: Linear      Affective Functioning: Blunted      Mood: depressed      Level of consciousness:  Alert      Response to Learning: Able to retain information      Endings: None Reported    Modes of Intervention: Education and Activity      Discipline Responsible: /Counselor      Signature:  JENNIFFER Camejo

## 2022-04-06 NOTE — BH NOTE
Outpatient Treatment Team Progress Note  Date of Review: 4/5/2022      Multidisciplinary Outpatient Treatment Team met to discuss and review patient's progress in group and individual therapy sessions. Presenting Problem: Patient was referred to the program for sever depression due to a long family trauma history.     Patient's Current Treatment Status: Patient's limited engagement in the group and vague goals has made it difficult for the patient     Treatment Start Date: 3/21/2022    Treatment Step-Down Date: n/a    Tentative Discharge Date: 4/29/2022 [place] Counseling:  Patient advised that medication is irritating and drying.  Patient may need to apply sparingly and wash off after an hour before eventually leaving it on overnight.  The patient verbalized understanding of the proper use and possible adverse effects of tazorac.  All of the patient's questions and concerns were addressed.

## 2022-04-07 ENCOUNTER — HOSPITAL ENCOUNTER (OUTPATIENT)
Dept: PSYCHIATRY | Age: 57
Setting detail: THERAPIES SERIES
Discharge: HOME | End: 2022-04-07
Payer: COMMERCIAL

## 2022-04-07 ENCOUNTER — TELEPHONE (OUTPATIENT)
Dept: PULMONOLOGY | Age: 57
End: 2022-04-07

## 2022-04-07 DIAGNOSIS — F33.2 MAJOR DEPRESSIVE DISORDER, RECURRENT SEVERE WITHOUT PSYCHOTIC FEATURES (HCC): ICD-10-CM

## 2022-04-07 PROCEDURE — 90853 GROUP PSYCHOTHERAPY: CPT | Performed by: SOCIAL WORKER

## 2022-04-07 PROCEDURE — 90853 GROUP PSYCHOTHERAPY: CPT

## 2022-04-07 NOTE — BH NOTE
Pt c/o not being able to sleep. Writer informed Dr. Matthew Luna increased pt's Remeron from 15 mg to 30 mg nightly and will follow follow up with pt tomorrow. Pt informed. /106 HR 61.

## 2022-04-07 NOTE — GROUP NOTE
Group Therapy Note    Date: 4/7/2022    Group Start Time: 10:15 AM  Group End Time: 11:00 AM  Group Topic: Psychoeducation    [facility]    [address]        Group Therapy Note    Attendees: 6    Participants learned about compassion for others vs self compassion and how important self compassion is. They also completed the activity \"Hand full of compliments\" and they processed how it felt to receive compliments from their peers. Notes:  Nonda Goodell attended group and processed how he treats other with compassion but is totally the opposite with his self compassion.      Status After Intervention:  Improved    Participation Level: Interactive    Participation Quality: Appropriate, Attentive, Sharing and Supportive      Speech:  normal      Thought Process/Content: Logical      Affective Functioning: Congruent      Mood: euthymic      Level of consciousness:  Alert, Oriented x4 and Attentive      Response to Learning: Able to verbalize/acknowledge new learning, Capable of insight and Progressing to goal      Endings: None Reported    Modes of Intervention: Education and Activity      Discipline Responsible: /Counselor      Signature:  [address]
Group Therapy Note    Date: 4/7/2022    Group Start Time: 11:15 AM  Group End Time: 12:15 PM  Group Topic: Psychotherapy    [facility]        Group Therapy Note    Topic: Communication Strategies - Assertiveness Training    Group facilitator provided education on communication strategies, differentiating between passive, assertive, and aggressive communication, identifying verbal, para-verbal, and physical behaviors associated with each. Across session group members processed the role of assertive communication in challenging negative behaviors, situational communication strategies, and goals to assert self in conflict-resolution. Attendees: 6         Notes:  Kevin Mares was present and actively engaged across interventions, reflective while processing disconnect between facial affect and assertive communication. Kevin Mares reports that following incidents in family trauma, he became an aggressive communicator leading to impacted relationships. Kevin Mares reports that now he is \"soft-spoken\" and easy-going. Kevin Mares received validation from peers in his progress toward assertive communication while participating in group therapy. Status After Intervention:  Improved    Participation Level:  Active Listener and Interactive    Participation Quality: Appropriate, Sharing and Supportive      Speech:  normal      Thought Process/Content: Logical      Affective Functioning: Congruent      Mood: depressed      Level of consciousness:  Alert and Attentive      Response to Learning: Able to verbalize current knowledge/experience and Capable of insight      Endings: None Reported    Modes of Intervention: Education, Support, Exploration, Clarifying and Problem-solving      Discipline Responsible: Psychoeducational Specialist      Signature:  Paramjit Denney MM, MT-BC
Activity      Discipline Responsible: /Counselor      Signature:  JENNIFFER Medrano

## 2022-04-07 NOTE — TELEPHONE ENCOUNTER
Patient called and left a message wanting to schedule new patient appointment. Called patient back and could not leave a message due to mailbox was full.

## 2022-04-08 ENCOUNTER — HOSPITAL ENCOUNTER (OUTPATIENT)
Age: 57
Setting detail: OBSERVATION
Discharge: HOME | End: 2022-04-09
Attending: EMERGENCY MEDICINE | Admitting: HOSPITALIST
Payer: COMMERCIAL

## 2022-04-08 ENCOUNTER — NURSE TRIAGE (OUTPATIENT)
Dept: OTHER | Facility: CLINIC | Age: 57
End: 2022-04-08

## 2022-04-08 ENCOUNTER — APPOINTMENT (OUTPATIENT)
Dept: PSYCHIATRY | Age: 57
End: 2022-04-08
Payer: COMMERCIAL

## 2022-04-08 ENCOUNTER — HOSPITAL ENCOUNTER (OUTPATIENT)
Dept: PSYCHIATRY | Age: 57
Setting detail: THERAPIES SERIES
Discharge: HOME | End: 2022-04-08
Payer: COMMERCIAL

## 2022-04-08 ENCOUNTER — APPOINTMENT (OUTPATIENT)
Dept: GENERAL RADIOLOGY | Age: 57
End: 2022-04-08
Payer: COMMERCIAL

## 2022-04-08 ENCOUNTER — TELEPHONE (OUTPATIENT)
Dept: FAMILY MEDICINE CLINIC | Age: 57
End: 2022-04-08

## 2022-04-08 VITALS
OXYGEN SATURATION: 96 % | RESPIRATION RATE: 16 BRPM | DIASTOLIC BLOOD PRESSURE: 111 MMHG | TEMPERATURE: 97.7 F | HEART RATE: 68 BPM | SYSTOLIC BLOOD PRESSURE: 172 MMHG

## 2022-04-08 DIAGNOSIS — I10 ACCELERATED HYPERTENSION: ICD-10-CM

## 2022-04-08 DIAGNOSIS — R07.89 CHEST PRESSURE: Primary | ICD-10-CM

## 2022-04-08 DIAGNOSIS — F33.2 MAJOR DEPRESSIVE DISORDER, RECURRENT SEVERE WITHOUT PSYCHOTIC FEATURES (HCC): ICD-10-CM

## 2022-04-08 LAB
ANION GAP SERPL CALCULATED.3IONS-SCNC: 10 MMOL/L (ref 3–16)
BASOPHILS ABSOLUTE: 0.1 K/UL (ref 0–0.2)
BASOPHILS RELATIVE PERCENT: 1.6 %
BUN BLDV-MCNC: 14 MG/DL (ref 7–20)
CALCIUM SERPL-MCNC: 9.7 MG/DL (ref 8.3–10.6)
CHLORIDE BLD-SCNC: 101 MMOL/L (ref 99–110)
CO2: 27 MMOL/L (ref 21–32)
CREAT SERPL-MCNC: 1.6 MG/DL (ref 0.9–1.3)
EKG ATRIAL RATE: 56 BPM
EKG DIAGNOSIS: NORMAL
EKG P AXIS: 12 DEGREES
EKG P-R INTERVAL: 192 MS
EKG Q-T INTERVAL: 398 MS
EKG QRS DURATION: 92 MS
EKG QTC CALCULATION (BAZETT): 384 MS
EKG R AXIS: 35 DEGREES
EKG T AXIS: 39 DEGREES
EKG VENTRICULAR RATE: 56 BPM
EOSINOPHILS ABSOLUTE: 0.1 K/UL (ref 0–0.6)
EOSINOPHILS RELATIVE PERCENT: 2.3 %
GFR AFRICAN AMERICAN: 54
GFR NON-AFRICAN AMERICAN: 45
GLUCOSE BLD-MCNC: 87 MG/DL (ref 70–99)
HCT VFR BLD CALC: 41 % (ref 40.5–52.5)
HEMOGLOBIN: 13.6 G/DL (ref 13.5–17.5)
LYMPHOCYTES ABSOLUTE: 2.3 K/UL (ref 1–5.1)
LYMPHOCYTES RELATIVE PERCENT: 45.3 %
MCH RBC QN AUTO: 31.6 PG (ref 26–34)
MCHC RBC AUTO-ENTMCNC: 33.2 G/DL (ref 31–36)
MCV RBC AUTO: 95.1 FL (ref 80–100)
MONOCYTES ABSOLUTE: 0.5 K/UL (ref 0–1.3)
MONOCYTES RELATIVE PERCENT: 8.8 %
NEUTROPHILS ABSOLUTE: 2.2 K/UL (ref 1.7–7.7)
NEUTROPHILS RELATIVE PERCENT: 42 %
PDW BLD-RTO: 13.9 % (ref 12.4–15.4)
PLATELET # BLD: 188 K/UL (ref 135–450)
PMV BLD AUTO: 8.5 FL (ref 5–10.5)
POTASSIUM REFLEX MAGNESIUM: 4.8 MMOL/L (ref 3.5–5.1)
PRO-BNP: 26 PG/ML (ref 0–124)
RBC # BLD: 4.31 M/UL (ref 4.2–5.9)
SODIUM BLD-SCNC: 138 MMOL/L (ref 136–145)
TROPONIN: <0.01 NG/ML
WBC # BLD: 5.2 K/UL (ref 4–11)

## 2022-04-08 PROCEDURE — 93005 ELECTROCARDIOGRAM TRACING: CPT | Performed by: EMERGENCY MEDICINE

## 2022-04-08 PROCEDURE — 6370000000 HC RX 637 (ALT 250 FOR IP): Performed by: PHYSICIAN ASSISTANT

## 2022-04-08 PROCEDURE — G0378 HOSPITAL OBSERVATION PER HR: HCPCS

## 2022-04-08 PROCEDURE — 90853 GROUP PSYCHOTHERAPY: CPT

## 2022-04-08 PROCEDURE — 93010 ELECTROCARDIOGRAM REPORT: CPT | Performed by: INTERNAL MEDICINE

## 2022-04-08 PROCEDURE — 80048 BASIC METABOLIC PNL TOTAL CA: CPT

## 2022-04-08 PROCEDURE — 6370000000 HC RX 637 (ALT 250 FOR IP): Performed by: EMERGENCY MEDICINE

## 2022-04-08 PROCEDURE — 36415 COLL VENOUS BLD VENIPUNCTURE: CPT

## 2022-04-08 PROCEDURE — 85025 COMPLETE CBC W/AUTO DIFF WBC: CPT

## 2022-04-08 PROCEDURE — 84484 ASSAY OF TROPONIN QUANT: CPT

## 2022-04-08 PROCEDURE — 99215 OFFICE O/P EST HI 40 MIN: CPT | Performed by: PSYCHIATRY & NEUROLOGY

## 2022-04-08 PROCEDURE — 90853 GROUP PSYCHOTHERAPY: CPT | Performed by: SOCIAL WORKER

## 2022-04-08 PROCEDURE — 83880 ASSAY OF NATRIURETIC PEPTIDE: CPT

## 2022-04-08 PROCEDURE — 99285 EMERGENCY DEPT VISIT HI MDM: CPT

## 2022-04-08 PROCEDURE — 2580000003 HC RX 258: Performed by: HOSPITALIST

## 2022-04-08 PROCEDURE — 71045 X-RAY EXAM CHEST 1 VIEW: CPT

## 2022-04-08 RX ORDER — MORPHINE SULFATE 2 MG/ML
2 INJECTION, SOLUTION INTRAMUSCULAR; INTRAVENOUS EVERY 6 HOURS PRN
Status: DISCONTINUED | OUTPATIENT
Start: 2022-04-08 | End: 2022-04-09 | Stop reason: HOSPADM

## 2022-04-08 RX ORDER — ONDANSETRON 4 MG/1
4 TABLET, ORALLY DISINTEGRATING ORAL EVERY 8 HOURS PRN
Status: DISCONTINUED | OUTPATIENT
Start: 2022-04-08 | End: 2022-04-09 | Stop reason: HOSPADM

## 2022-04-08 RX ORDER — SODIUM CHLORIDE 0.9 % (FLUSH) 0.9 %
5-40 SYRINGE (ML) INJECTION EVERY 12 HOURS SCHEDULED
Status: DISCONTINUED | OUTPATIENT
Start: 2022-04-08 | End: 2022-04-09 | Stop reason: HOSPADM

## 2022-04-08 RX ORDER — NITROGLYCERIN 0.4 MG/1
0.4 TABLET SUBLINGUAL EVERY 5 MIN PRN
Status: DISCONTINUED | OUTPATIENT
Start: 2022-04-08 | End: 2022-04-09 | Stop reason: HOSPADM

## 2022-04-08 RX ORDER — ASPIRIN 81 MG/1
324 TABLET, CHEWABLE ORAL ONCE
Status: COMPLETED | OUTPATIENT
Start: 2022-04-08 | End: 2022-04-08

## 2022-04-08 RX ORDER — SODIUM CHLORIDE 0.9 % (FLUSH) 0.9 %
5-40 SYRINGE (ML) INJECTION PRN
Status: DISCONTINUED | OUTPATIENT
Start: 2022-04-08 | End: 2022-04-09 | Stop reason: HOSPADM

## 2022-04-08 RX ORDER — HYDROCHLOROTHIAZIDE 25 MG/1
25 TABLET ORAL DAILY
Status: CANCELLED | OUTPATIENT
Start: 2022-04-09

## 2022-04-08 RX ORDER — POLYETHYLENE GLYCOL 3350 17 G/17G
17 POWDER, FOR SOLUTION ORAL DAILY PRN
Status: DISCONTINUED | OUTPATIENT
Start: 2022-04-08 | End: 2022-04-09 | Stop reason: HOSPADM

## 2022-04-08 RX ORDER — ACETAMINOPHEN 650 MG/1
650 SUPPOSITORY RECTAL EVERY 6 HOURS PRN
Status: DISCONTINUED | OUTPATIENT
Start: 2022-04-08 | End: 2022-04-09 | Stop reason: HOSPADM

## 2022-04-08 RX ORDER — ASPIRIN 81 MG/1
81 TABLET, CHEWABLE ORAL DAILY
Status: DISCONTINUED | OUTPATIENT
Start: 2022-04-09 | End: 2022-04-09 | Stop reason: HOSPADM

## 2022-04-08 RX ORDER — ONDANSETRON 2 MG/ML
4 INJECTION INTRAMUSCULAR; INTRAVENOUS EVERY 6 HOURS PRN
Status: DISCONTINUED | OUTPATIENT
Start: 2022-04-08 | End: 2022-04-09 | Stop reason: HOSPADM

## 2022-04-08 RX ORDER — CLONIDINE HYDROCHLORIDE 0.1 MG/1
0.1 TABLET ORAL ONCE
Status: COMPLETED | OUTPATIENT
Start: 2022-04-08 | End: 2022-04-08

## 2022-04-08 RX ORDER — HYDRALAZINE HYDROCHLORIDE 20 MG/ML
10 INJECTION INTRAMUSCULAR; INTRAVENOUS EVERY 6 HOURS PRN
Status: DISCONTINUED | OUTPATIENT
Start: 2022-04-08 | End: 2022-04-09 | Stop reason: HOSPADM

## 2022-04-08 RX ORDER — ACETAMINOPHEN 325 MG/1
650 TABLET ORAL EVERY 6 HOURS PRN
Status: DISCONTINUED | OUTPATIENT
Start: 2022-04-08 | End: 2022-04-09 | Stop reason: HOSPADM

## 2022-04-08 RX ORDER — CHLORTHALIDONE 25 MG/1
25 TABLET ORAL ONCE
Status: COMPLETED | OUTPATIENT
Start: 2022-04-08 | End: 2022-04-08

## 2022-04-08 RX ORDER — SODIUM CHLORIDE 9 MG/ML
INJECTION, SOLUTION INTRAVENOUS PRN
Status: DISCONTINUED | OUTPATIENT
Start: 2022-04-08 | End: 2022-04-09 | Stop reason: HOSPADM

## 2022-04-08 RX ADMIN — NITROGLYCERIN 1 INCH: 20 OINTMENT TOPICAL at 19:08

## 2022-04-08 RX ADMIN — Medication 10 ML: at 21:15

## 2022-04-08 RX ADMIN — CHLORTHALIDONE 25 MG: 25 TABLET ORAL at 15:32

## 2022-04-08 RX ADMIN — CLONIDINE HYDROCHLORIDE 0.1 MG: 0.1 TABLET ORAL at 18:09

## 2022-04-08 RX ADMIN — ASPIRIN 81 MG 324 MG: 81 TABLET ORAL at 19:09

## 2022-04-08 ASSESSMENT — HEART SCORE
ECG: 0
ECG: 0

## 2022-04-08 NOTE — TELEPHONE ENCOUNTER
Received call from [facility] with Red Flag Complaint. Subjective: Caller states \"High blood pressure\"    Current Symptoms: Was at a mental hospital therapy class, nurse checked bp 3 times, 163/105 HR 64, then 172/111 HR:68 then 173/102 HR 60,  Nurse told caller to follow up with PCP\"     Pressure in center of chest area, heaviness, does not radiate, causing almost more difficult to breath, shortness of breath now, hx mini stroke and takes BP medicine    Onset: 1 day ago; slow, worsening    Associated Symptoms: NA    Pain Severity: 6/10; pressure; constant    Temperature:Denies    What has been tried: Resting-no    LMP: NA Pregnant: NA    Recommended disposition: Call  Now, Reiterated my disposition to the patient and expressed concern for the patient's well-being. Patient states \"I will go ahead and head on to the hospital, I'm already on my way\"    Care advice provided, patient verbalizes understanding; denies any other questions or concerns; instructed to call back for any new or worsening symptoms. Writer provided warm transfer to [place] [facility] for Refusal of Urgent Disposition, patient hung up waiting to connect with office, [place] will attempt to call patient back    Attention Provider: Thank you for allowing me to participate in the care of your patient. The patient was connected to triage in response to information provided to the ECC/PSC. Please do not respond through this encounter as the response is not directed to a shared pool.       Reason for Disposition   Chest pain lasting longer than 5 minutes and ANY of the following:* Over 40years old* Over 27years old and at least one cardiac risk factor (e.g., diabetes mellitus, high blood pressure, high cholesterol, smoker, or strong family history of heart disease)* History of heart disease (i.e., angina, heart attack, heart failure, bypass surgery, takes nitroglycerin)* Pain is crushing, pressure-like, or heavy    Protocols used: CHEST PAIN-ADULT-[place]

## 2022-04-08 NOTE — ED PROVIDER NOTES
I independently saw performed a substantive portion of the visit (history, physical, and MDM) on Jules Toledo. All diagnostic, treatment, and disposition decisions were made by myself in conjunction with the advanced practice provider. I have participated in the medical decision making and directed the treatment plan and disposition of the patient. For further details of [address] [facility] emergency department encounter, please see the advanced practice provider's documentation. CHIEF COMPLAINT  Chief Complaint   Patient presents with    Hypertension     Came in for c/o high blood pressure, taking meds as prescribed. States \"heaviness in my chest\". Briefly, Jules Toledo is a 55 y.o. male  who presents to the ED complaining of central chest pressure and discomfort, squeezing in quality as well, waxing waning without clear provocation. Sometimes he gets short of breath with it. He is also hypertensive. He is not a cardiac work-ups in 2017. No history of blood clots. No fever or cough. No leg swelling. FOCUSED PHYSICAL EXAMINATION  BP (!) 149/108   Pulse 55   Temp 98.4 °F (36.9 °C) (Oral)   Resp 17   Ht 5' 9\" (1.753 m)   Wt 300 lb (136.1 kg)   SpO2 95%   BMI 44.30 kg/m²    Focused physical examination notable for no acute distress, well-appearing, well-nourished, normal speech and mentation without obvious facial droop, no obvious rash. No obvious cranial nerve deficits on my initial exam. RRR CTAB, abd soft NTND, no leg swelling.     The 12 lead EKG was interpreted by me as follows:  Rate: bradycardia with a rate of 56  Rhythm: sinus  Axis: normal  Intervals: normal OK, narrow QRS, normal QTc  ST segments: no ST elevations or depressions  T waves: no abnormal inversions  Non-specific T wave changes: not present  Prior EKG comparison: EKG dated 2/28/22 is not significantly different    MDM:  Diagnostic considerations included acute coronary syndrome, pulmonary embolism, COPD/asthma, pneumonia, musculoskeletal, reflux/PUD/gastritis, pneumothorax, CHF, thoracic aortic dissection, anxiety    ED course was notable for concern for anginal equivalent symptoms. P.o. meds did not really improve his blood pressure significantly. Troponin negative and EKG is not acutely ischemic however his heart score is a 4 and he has no recent cardiac work-up and I do feel that admission for ACS rule out is warranted for this reason. Chest x-ray is clear. He is not tachycardic tachypneic or hypoxic or have any other significant risk factors for PE. HEART SCORE:    History: 1  EC  Patient Age: 2  *Risk factors for Atherosclerotic disease: Hypertension; Obesity; Hypercholesterolemia  Risk Factors: 2  Troponin: 0  Heart Score Total: 4      Heart score: 4. This falls under the following category: Score of 4-6, which indicates low/moderate risk for major adverse cardiac event and supports observation with repeated troponins and/or non-invasive testing      During the patient's ED course, the patient was given:  Medications   chlorthalidone (HYGROTON) tablet 25 mg (25 mg Oral Given 22 153)   cloNIDine (CATAPRES) tablet 0.1 mg (0.1 mg Oral Given 22 180)   aspirin chewable tablet 324 mg (324 mg Oral Given 22)   nitroglycerin (NITRO-BID) 2 % ointment 1 inch (1 inch Topical Given 22)        CLINICAL IMPRESSION  1. Chest pressure    2. Accelerated hypertension        DISPOSITION  Loulou Santoyo was admitted in fair condition. The plan is to admit to the hospital at this time under the hospitalist service. Hospitalist accepted the patient and will take over the patient's care. This chart was created using Dragon dictation software. Efforts were made by me to ensure accuracy, however some errors may be present due to limitations of this technology.              Tawny Rios MD  [id number]

## 2022-04-08 NOTE — GROUP NOTE
Group Therapy Note    Date: 4/8/2022    Group Start Time: 10:00 AM  Group End Time: 11:00 AM  Group Topic: Psychoeducation    [facility]    JENNIFFER Reyna        Group Therapy Note  Clinician introduced how to overcome emotional reactivity and build emotional stability. The clinician defined the two and the group members noticed it was another therapeutic technique that requires balance. The clinician had members take turns reading the paragraphs and coming up with coping skills for each section. The clinician read the rest of the article while the group members colored mandales for the group member who was leaving today. They colored the center of the madala to represent the member who was leaving. Attendees: 7         Patient's Goal:      Notes:  Patient was cooperative and fully engaged in the activity and interactive discussion. He was openly engaging in the discussion and sharing the skills that he would use in certain situations in the worksheet. He was able to talk and share in laughter and conversation with other group members. Status After Intervention:  Improved    Participation Level:  Active Listener and Interactive    Participation Quality: Appropriate, Attentive, Sharing and Supportive      Speech:  normal      Thought Process/Content: Logical      Affective Functioning: Congruent      Mood: euthymic      Level of consciousness:  Oriented x4      Response to Learning: Able to verbalize/acknowledge new learning      Endings: None Reported    Modes of Intervention: Education and Activity      Discipline Responsible: /Counselor      Signature:  JENNIFFER Reyna

## 2022-04-08 NOTE — H&P
_    Ismael Gardner HOSPITALIST HISTORY AND PHYSICAL/CONSULT    4/8/2022 7:20 PM    Patient Information:  Ricki Duarte is a 55 y.o. male [id number]    PCP:  Reece Burrows MD (Tel: [phone] )    Date of Service:   Pt seen/examined on 4/8/2022   Placed in Observation. Chief complaint:    Chief Complaint   Patient presents with    Hypertension     Came in for c/o high blood pressure, taking meds as prescribed. States \"heaviness in my chest\". History of Present Illness:  Mayela Lechuga is a 55 y.o. male who presented with   · He has been having High BPs @ Home X few days . Started having sub sternal Chest pain vs chest pressure since yesterday . It stayed on and off all day yesterday and today as well . No radiation of discomfort reported . He describes it as a pressure like feeling . Also reports HA since today AM . D/t persistent s/s , he decided to come to ED for an evaluation        History and pertinent information obtained from   · ED provider   · Prior Chart    · Patient         Notable/Significant ED Findings/VItals :   · HTN   ·        · Imaging/Labs/Work up/Medications given/Consults called by ED => Reviewed and Noted   · Chlorthalidone X 1    · Clonidine X 1    · NTG   · ASA X 1       On my evaluation, patient's condition as below :   · Since arrival to ED, post above Rx, patient is AO X 3   · Chest discomfort has improved since arrival . Now rates it as 3-4/10   · No SOB   · BP readings have improved as well     No obvious signs of s/o acute Life Threatening Respiratory distress or acute Cardiac or hemodynamic instability, noted @ time of my evaluation of patient. 10 complete review of symptoms performed. Pertinent positives and negatives listed above in HPI. REVIEW OF SYSTEMS:   Pertinent positives and negatives are noted in the HPI . All other systems were reviewed and are negative.          Past Medical History:         has a past medical history of Arthritis, Bilateral renal cysts, DJD (degenerative joint disease), Elevated uric acid in blood, GERD (gastroesophageal reflux disease), Gout, HTN (hypertension), Hypertension, Movement disorder, Prostate cancer ([facility] [address] Renal insufficiency, and TIA (transient ischemic attack). Past Surgical History:         has a past surgical history that includes Foot surgery (1999); Elbow surgery (2010); Wrist surgery; Prostatectomy (2016); Colonoscopy (3/10/16); Endoscopy, colon, diagnostic; and Prostatectomy (3/28/16). Medications:        Current Outpatient Medications on File Prior to Encounter   Medication Sig Dispense Refill    mirtazapine (REMERON) 15 MG tablet Take 1 tablet by mouth nightly 30 tablet 0    sertraline (ZOLOFT) 100 MG tablet Take 1 tablet by mouth daily 30 tablet 0    losartan (COZAAR) 100 MG tablet TAKE ONE TABLET BY MOUTH DAILY 90 tablet 3    pantoprazole (PROTONIX) 40 MG tablet TAKE ONE TABLET BY MOUTH EVERY MORNING BEFORE BREAKFAST 90 tablet 3    atorvastatin (LIPITOR) 10 MG tablet TAKE ONE TABLET BY MOUTH ONCE NIGHTLY 90 tablet 3    allopurinol (ZYLOPRIM) 100 MG tablet Take 2 tablets by mouth daily 180 tablet 2    ASPIRIN LOW DOSE 81 MG chewable tablet CHEW ONE TABLET BY MOUTH DAILY 90 tablet 3    mometasone (ASMANEX, 30 METERED DOSES,) 110 MCG/INH AEPB Inhale 2 puffs into the lungs 2 times daily 1 Inhaler 2    loratadine (CLARITIN) 10 MG tablet Take 1 tablet by mouth daily 30 tablet 2    acetaminophen (TYLENOL) 325 MG tablet Take 650 mg by mouth every 6 hours as needed for Pain (last dose 8am)           Allergies: Allergies   Allergen Reactions    Aleve [Naproxen Sodium] Rash     \"pt takes aspirin at home\"    Orellana Swelling    Norvasc [Amlodipine Besylate] Other (See Comments)     Palpitations/chest pressure          Social History:   reports that he has never smoked. He has never used smokeless tobacco. He reports that he does not drink alcohol and does not use drugs.        Family History: Problem Relation Age of Onset    Lupus Mother     Stroke Mother     Other Mother         COPD           Physical Exam:  BP (!) 149/108   Pulse 55   Temp 98.4 °F (36.9 °C) (Oral)   Resp 17   Ht 5' 9\" (1.753 m)   Wt 300 lb (136.1 kg)   SpO2 95%   BMI 44.30 kg/m²     General appearance: Appears weak    Eyes:  Sclera clear, pupils equal  ENT:  Moist mucus membranes, Trachea midline. Cardiovascular: Regular rhythm, normal S1, S2. No edema in lower extremities   Respiratory:  Noted Dec AE B/ L . Gastrointestinal:  Abdomen soft,  non-tender,  not distended  Musculoskeletal:  No cyanosis in digits, neck supple  Neurology:  Cranial nerves grossly intact. Alert and oriented in time, place and person. No speech or motor deficits   Psychiatry:  Appropriate affect. Not agitated  Skin:  Warm, dry, normal turgor, no rash       Labs:     Recent Labs     04/08/22  1441   WBC 5.2   HGB 13.6   HCT 41.0        Recent Labs     04/08/22  1441      K 4.8      CO2 27   BUN 14   CREATININE 1.6*   CALCIUM 9.7     No results for input(s): AST, ALT, BILIDIR, BILITOT, ALKPHOS in the last 72 hours. No results for input(s): INR in the last 72 hours. Recent Labs     04/08/22  1441 04/08/22  1730   TROPONINI <0.01 <0.01         Urinalysis:      Lab Results   Component Value Date    NITRU Negative 02/28/2022    WBCUA 1 02/28/2022    RBCUA 5 02/28/2022    BLOODU TRACE 02/28/2022    SPECGRAV 1.024 02/28/2022    GLUCOSEU Negative 02/28/2022         Radiology:     CXR:   I have reviewed the CXR  No acute findings or is unremarkable for any acute pathology needing acute interventions, on review. EKG/Telemonitor :    I have reviewed the EKG  Sinus Bradycardia     IMAGING :     XR CHEST PORTABLE   Final Result   No acute abnormality.                PROBLEM LIST [ ACTIVE + CHRONIC ]     Active Hospital Problems    Diagnosis Date Noted    Chest pain [R07.9] 01/22/2015     Priority: High    Hypertension, essential [I10] 05/22/2013     Priority: High    PTSD (post-traumatic stress disorder) [F43.10] 03/01/2022    Adjustment disorder with mixed anxiety and depressed mood [F43.23] 02/21/2022    Mixed hyperlipidemia [E78.2] 08/25/2017    Prostate cancer ([facility] [address] Joss Middleton 02/24/2016         ACUTE/CHRONIC ISSUES      Chest pain POA    Uncontrolled HTN POA    CKD III A POA @ Baseline      Obesity III    PTSD    Anxiety and depression    HLD    Gout    Seasonal allergies    GERD     PERTINENT PLAN OF CARE       Will continue cardiac monitor while inpatient. Admission EKG reviewed . EKG prn chest pain. Cardiac enzymes on admission in ED noted per Labs and will trend cardiac enzymes further while inpatient.  Stress MPI in AM    ASA QD resumed    NPO PM for now for stress test in AM    Added IV Prn Hydralazine . Resumed Home BP medications . May need further titration in house of BP medications . Renal c/s planned in AM for managing BP in CKD patient . · Medication received in ED and workup in ED so far Noted and reviewed as above. · Home medications for chronic medical problems Reviewed and Held/Resumed/Changes made, as clinically warranted/Indicated. · Please See EPIC Order for detailed plans of care and further Details. · DVT Prophylaxis . Is on Lovenox   ; + SCDs   · Nutrition/Diet. No diet orders on file  · Code Status/Advanced Care Plan . Prior  · PT/OT and ambulatory Eval Status. Ambulate with Assist   · Probable LOS & future Disposition planned post discharge . HOme in 1-2 days       CONSULTS ORDERED @ ADMISSION   IP CONSULT TO HOSPITALIST      Medical Decision Making : HIGH     Total patient Care [ Direct and Indirect ] time spent in evaluating the patient an discussing plan with appropriate staff/patient/family members is ~ 45-50 min       Latisha Zuñiga MD    Hospitalist, [facility].    4/8/2022 7:20 PM

## 2022-04-08 NOTE — TELEPHONE ENCOUNTER
Suzanna Stafford from Nurse Triage called and states pt has escalated HTN and pressure on his chest.  (see notes below). Pt hung up before he could be assisted by our office.   Suzanna Stafford had directed him to go to the ED  Called pt but no answer at this time

## 2022-04-08 NOTE — ED PROVIDER NOTES
Chris Peralta        Pt Name: Johnathan Purcell  MRN: [id number]  [place] 1965  Date of evaluation: 4/8/2022  Provider: Mira Lazo PA-C  PCP: Bruce Casas MD  Note Started: 3:43 PM EDT        I have seen and evaluated this patient with my supervising physician Tomas Bravo MD.    CHIEF COMPLAINT       Chief Complaint   Patient presents with    Hypertension     Came in for c/o high blood pressure, taking meds as prescribed. States \"heaviness in my chest\". HISTORY OF PRESENT ILLNESS   (Location, Timing/Onset, Context/Setting, Quality, Duration, Modifying Factors, Severity, Associated Signs and Symptoms)  Note limiting factors. Chief Complaint: Elevated blood pressure    Johnathan Purcell is a 55 y.o. male who presents for mental health with elevated blood pressure. There systolic pressures in excess of 1 60-1 80. Our initial blood pressure is 177/121. Indicates occasional headache. He indicates some occasional chest pressure and shortness of breath onset 24-36 hours. Patient is currently on losartan 100 mg for blood pressure. He had been on amlodipine previously with palpitation and chest pressure and no longer prescribe this product. Not on diuretic. The patient care medications include Remeron 15, sertraline 100,    Losartan 100, Protonix 40, Lipitor 10, allopurinol 100, aspirin 81, Claritin 10 and Tylenol as needed. It was July 23, 2017 with complaint chest pain the patient had echo study. Results showing EF 60% - 65%, no LVH it was a normal echo study. In addition, the patient also had a nuc med myocardial SPECT rest exercise study showing no evidence of myocardial ischemia. Normal EKG. He does not follow with cardiology. He does not indicate any family history of CAD. He does have history of HTN and HLD. No history diabetes. The patient's heart score is 4 based on story, HLD, HTN and obesity.     Nursing Notes were all reviewed and agreed with or any disagreements were addressed in the HPI. REVIEW OF SYSTEMS    (2-9 systems for level 4, 10 or more for level 5)     Review of Systems    Positives and Pertinent negatives as per HPI. Except as noted above in the ROS, all other systems were reviewed and negative.        PAST MEDICAL HISTORY     Past Medical History:   Diagnosis Date    Arthritis     Bilateral renal cysts 6/3/2013    US   6/3/13     DJD (degenerative joint disease) 8/23/2011    Elevated uric acid in blood 5/22/2013    GERD (gastroesophageal reflux disease)     Gout 2013    HTN (hypertension) 5/22/2013    Hypertension     Movement disorder     gout; left knee along w/generallized arthritis    Prostate cancer ([facility] [address] 2/24/16    Renal insufficiency 6/1/2013    TIA (transient ischemic attack)          SURGICAL HISTORY     Past Surgical History:   Procedure Laterality Date    COLONOSCOPY  3/10/16    repeat 10 yrs    ELBOW SURGERY  2010    [place]    ENDOSCOPY, COLON, DIAGNOSTIC      FOOT SURGERY  1999    left    PROSTATECTOMY  2016    [place]    PROSTATECTOMY  3/28/16    robotic laparoscopic radical     WRIST SURGERY           CURRENTMEDICATIONS       Current Discharge Medication List      CONTINUE these medications which have NOT CHANGED    Details   mirtazapine (REMERON) 15 MG tablet Take 1 tablet by mouth nightly  Qty: 30 tablet, Refills: 0      sertraline (ZOLOFT) 100 MG tablet Take 1 tablet by mouth daily  Qty: 30 tablet, Refills: 0      losartan (COZAAR) 100 MG tablet TAKE ONE TABLET BY MOUTH DAILY  Qty: 90 tablet, Refills: 3    Associated Diagnoses: Essential hypertension      pantoprazole (PROTONIX) 40 MG tablet TAKE ONE TABLET BY MOUTH EVERY MORNING BEFORE BREAKFAST  Qty: 90 tablet, Refills: 3      atorvastatin (LIPITOR) 10 MG tablet TAKE ONE TABLET BY MOUTH ONCE NIGHTLY  Qty: 90 tablet, Refills: 3    Associated Diagnoses: Mixed hyperlipidemia      allopurinol (ZYLOPRIM) 100 MG tablet Take 2 tablets by mouth daily  Qty: 180 tablet, Refills: 2    Associated Diagnoses: Elevated uric acid in blood      ASPIRIN LOW DOSE 81 MG chewable tablet CHEW ONE TABLET BY MOUTH DAILY  Qty: 90 tablet, Refills: 3      mometasone (ASMANEX, 30 METERED DOSES,) 110 MCG/INH AEPB Inhale 2 puffs into the lungs 2 times daily  Qty: 1 Inhaler, Refills: 2      loratadine (CLARITIN) 10 MG tablet Take 1 tablet by mouth daily  Qty: 30 tablet, Refills: 2      acetaminophen (TYLENOL) 325 MG tablet Take 650 mg by mouth every 6 hours as needed for Pain (last dose 8am)               ALLERGIES     Aleve [naproxen sodium], Orellana, and Norvasc [amlodipine besylate]    FAMILYHISTORY       Family History   Problem Relation Age of Onset    Lupus Mother     Stroke Mother     Other Mother         COPD          SOCIAL HISTORY       Social History     Tobacco Use    Smoking status: Never Smoker    Smokeless tobacco: Never Used   Vaping Use    Vaping Use: Never used   Substance Use Topics    Alcohol use: No    Drug use: No       SCREENINGS    Gregg Coma Scale  Eye Opening: Spontaneous  Best Verbal Response: Oriented  Best Motor Response: Obeys commands  [place] Coma Scale Score: 15 Heart Score for chest pain patients  History: Moderately Suspicious  ECG: Normal  Patient Age: > 39 and < 65 years  *Risk factors for Atherosclerotic disease: Hypertension,Obesity,Hypercholesterolemia  Risk Factors: > 3 Risk factors or history of atherosclerotic disease*  Troponin: < 1X normal limit  Heart Score Total: 4      PHYSICAL EXAM    (up to 7 for level 4, 8 or more for level 5)     ED Triage Vitals [04/08/22 1404]   BP Temp Temp Source Pulse Resp SpO2 Height Weight   (!) 177/121 98.4 °F (36.9 °C) Oral 65 22 97 % 5' 9\" (1.753 m) 300 lb (136.1 kg)       Physical Exam  Vitals and nursing note reviewed. Constitutional:       Appearance: Normal appearance. He is well-developed. He is obese. HENT:      Head: Normocephalic and atraumatic.       Right Ear: External ear normal.      Left Ear: External ear normal.   Eyes:      General: No scleral icterus. Right eye: No discharge. Left eye: No discharge. Conjunctiva/sclera: Conjunctivae normal.      Pupils: Pupils are equal, round, and reactive to light. Cardiovascular:      Rate and Rhythm: Normal rate and regular rhythm. Heart sounds: Normal heart sounds. Pulmonary:      Effort: Pulmonary effort is normal.      Breath sounds: Normal breath sounds. Chest:      Chest wall: No tenderness. Abdominal:      General: Abdomen is flat. Bowel sounds are normal.      Palpations: Abdomen is soft. Tenderness: There is no abdominal tenderness. Musculoskeletal:         General: Normal range of motion. Cervical back: Normal range of motion and neck supple. Right lower leg: No edema. Left lower leg: No edema. Skin:     General: Skin is warm and dry. Neurological:      General: No focal deficit present. Mental Status: He is alert and oriented to person, place, and time. Mental status is at baseline. Psychiatric:         Mood and Affect: Mood normal.         Behavior: Behavior normal.         Thought Content: Thought content normal.         Judgment: Judgment normal.         DIAGNOSTIC RESULTS   LABS:    Labs Reviewed   BASIC METABOLIC PANEL W/ REFLEX TO MG FOR LOW K - Abnormal; Notable for the following components:       Result Value    CREATININE 1.6 (*)     GFR Non- 45 (*)     GFR  54 (*)     All other components within normal limits   CBC WITH AUTO DIFFERENTIAL   TROPONIN   BRAIN NATRIURETIC PEPTIDE   TROPONIN   TROPONIN   TROPONIN   CBC   COMPREHENSIVE METABOLIC PANEL W/ REFLEX TO MG FOR LOW K   LIPID PANEL       When ordered only abnormal lab results are displayed. All other labs were within normal range or not returned as of this dictation. EKG:  When ordered, EKG's are interpreted by the Emergency Department Physician in the absence of a cardiologist.  Please see their note for interpretation of EKG. RADIOLOGY:   Non-plain film images such as CT, Ultrasound and MRI are read by the radiologist. Plain radiographic images are visualized and preliminarily interpreted by the ED Provider with the below findings:        Interpretation per the Radiologist below, if available at the time of this note:    XR CHEST PORTABLE   Final Result   No acute abnormality. NM Cardiac Stress Test Nuclear Imaging    (Results Pending)     XR CHEST PORTABLE    Result Date: 4/8/2022  EXAMINATION: ONE XRAY VIEW OF THE CHEST 4/8/2022 2:41 pm COMPARISON: None. HISTORY: ORDERING SYSTEM PROVIDED HISTORY: Chest pain TECHNOLOGIST PROVIDED HISTORY: Reason for exam:->Chest pain Reason for Exam: chest pain FINDINGS: The lungs are clear. The mediastinum and cardiac silhouette are unremarkable. No acute abnormality. PROCEDURES   Unless otherwise noted below, none     Procedures    CRITICAL CARE TIME     Critical Care  There was a high probability of life-threatening clinical deterioration in the patient's condition requiring my urgent intervention. Total critical care time with the patient was 35 minutes excluding separately reportable procedures. Critical care required due to patients burning blood pressure in the presence of central chest pressure and headache. I spent discussing case with attending physician and reevaluating the patient.       CONSULTS:  IP CONSULT TO HOSPITALIST  IP CONSULT TO NEPHROLOGY      EMERGENCY DEPARTMENT COURSE and DIFFERENTIAL DIAGNOSIS/MDM:   Vitals:    Vitals:    04/08/22 1830 04/08/22 1900 04/08/22 1930 04/08/22 2045   BP: (!) 168/109 (!) 149/108 (!) 135/101 135/86   Pulse: 57 55 54 57   Resp: 19 17 18 16   Temp:    97.4 °F (36.3 °C)   TempSrc:    Oral   SpO2: 96% 95% 95% 95%   Weight:       Height:           Patient was given the following medications:  Medications   sodium chloride flush 0.9 % injection 5-40 mL (10 mLs IntraVENous Given 4/8/22 2115)   sodium chloride flush 0.9 % injection 5-40 mL (has no administration in time range)   0.9 % sodium chloride infusion (has no administration in time range)   ondansetron (ZOFRAN-ODT) disintegrating tablet 4 mg (has no administration in time range)     Or   ondansetron (ZOFRAN) injection 4 mg (has no administration in time range)   acetaminophen (TYLENOL) tablet 650 mg (has no administration in time range)     Or   acetaminophen (TYLENOL) suppository 650 mg (has no administration in time range)   polyethylene glycol (GLYCOLAX) packet 17 g (has no administration in time range)   aspirin chewable tablet 81 mg (has no administration in time range)   regadenoson (LEXISCAN) injection 0.4 mg (has no administration in time range)   enoxaparin (LOVENOX) injection 40 mg (has no administration in time range)   nitroGLYCERIN (NITROSTAT) SL tablet 0.4 mg (has no administration in time range)   morphine (PF) injection 2 mg (has no administration in time range)   hydrALAZINE (APRESOLINE) injection 10 mg (has no administration in time range)   chlorthalidone (HYGROTON) tablet 25 mg (25 mg Oral Given 4/8/22 1532)   cloNIDine (CATAPRES) tablet 0.1 mg (0.1 mg Oral Given 4/8/22 1809)   aspirin chewable tablet 324 mg (324 mg Oral Given 4/8/22 1909)   nitroglycerin (NITRO-BID) 2 % ointment 1 inch (1 inch Topical Given 4/8/22 1908)         At 5:50 PM reassessed patient. He is now in the room. Initial blood pressure is 177/121. He did receive Hygroton 25 mg p.o.  BP now is 167/104. Overall he is improving. States he feels some continuing chest pressure without shortness of breath. Heart score was 3. Previous heart studies were negative. I did place order for clonidine point 1 mg p.o. I did discuss case with attending physician who evaluates patient. Patient with central chest pressure with referred pain, shortness of breath and a mild headache without history of CAD.   This patient did undergo cardiac testing 2017 which was negative. The patient did undergo troponin and delta troponin both not elevated. All laboratory studies normal with a normal limits. Blood pressure not responding to Hygroton 25 and clonidine 0.1. Patient currently on losartan 100 p.o. He has reaction to amlodipine. It is recommendation of attending physician the patient be admitted for further evaluation. Orders for nitro ointment and aspirin 324 have been placed. The patient is agreeable to the admission. What concerns the patient is that he had a \"mini stroke\" with the last time his pressure went inside. Case was discussed with attending physician who evaluates patient. The patient will be admitted by the hospitalist.      FINAL IMPRESSION      1. Chest pressure    2. Accelerated hypertension          DISPOSITION/PLAN   DISPOSITION        PATIENT REFERRED TO:  No follow-up provider specified. DISCHARGE MEDICATIONS:  Current Discharge Medication List          DISCONTINUED MEDICATIONS:  Current Discharge Medication List                 (Please note that portions of this note were completed with a voice recognition program.  Efforts were made to edit the dictations but occasionally words are mis-transcribed. )    Anna Winchester PA-C (electronically signed)           Anna Winchester PA-C  04/08/22 [id number]

## 2022-04-08 NOTE — PROGRESS NOTES
Department of Psychiatry  AttendingProgress Note  Chief Complaint: depression  Jody Luna has been more interactive in group today . He remains depressed and struggles with sleep/nightmares about his trauma. Discussed his meds and will increase Zoloft 150 mg Qd. Remeron 30 mg HS not helpful. Discussed possible addition of Prazosin for PTSD sxs. Patient's chart was reviewed and collaborated with  about the treatment plan. SUBJECTIVE:    Patient is feeling unchanged. Suicidal ideation:  denies suicidal ideation. Patient does not have medication side effects. ROS: Patient has new complaints: no  Sleeping adequately:  no   Appetite adequate: Yes  Attending groups: Yes  Visitors:NA    OBJECTIVE    Physical  VITALS:  There were no vitals taken for this visit. Mental Status Examination:  Patients appearance was street clothes. Thoughts are Goal directed. Homicidal ideations none. No abnormal movements, tics or mannerisms. Memory intact Aims 0. Concentration Good. Alert and oriented X 4. Insight and Judgement impaired insight. Patient was cooperative. Patient gait normal. Mood decreased range and depressed, affect depressed affect Hallucinations Absent, suicidal ideations no specific plan to harm self Speech normal volume  Data  Labs:   No visits with results within 2 Day(s) from this visit.    Latest known visit with results is:   Admission on 02/28/2022, Discharged on 03/02/2022   Component Date Value Ref Range Status    Cholesterol, Total 03/01/2022 143  0 - 199 mg/dL Final    Triglycerides 03/01/2022 81  0 - 150 mg/dL Final    HDL 03/01/2022 44  40 - 60 mg/dL Final    LDL Calculated 03/01/2022 83  <100 mg/dL Final    VLDL Cholesterol Calculated 03/01/2022 16  Not Established mg/dL Final    Hemoglobin A1C 03/01/2022 5.8  See comment % Final    Comment: Comment:  Diagnosis of Diabetes: > or = 6.5%  Increased risk of diabetes (Prediabetes): 5.7-6.4%  Glycemic Control: Nonpregnant Adults: <7.0% Pregnant: <6.0%        eAG 03/01/2022 119.8  mg/dL Final    TSH 03/01/2022 0.57  0.27 - 4.20 uIU/mL Final            Medications  No current facility-administered medications for this encounter. ASSESSMENT AND PLAN    Assessment:   1. Major Depression Recurrent Severe Nonpsychotic  2. PTSD     1. Patient s symptoms   are improving  2. Probable discharge is next week  3. Discharge planning is complete  4. Suicidal ideation is none  5. Total time with patient was 40 minutes and more than 50 % of that time was spent counseling the patient on their symptoms, treatment and expected goals.

## 2022-04-09 VITALS
RESPIRATION RATE: 16 BRPM | WEIGHT: 297 LBS | HEART RATE: 64 BPM | HEIGHT: 69 IN | BODY MASS INDEX: 43.99 KG/M2 | OXYGEN SATURATION: 95 % | DIASTOLIC BLOOD PRESSURE: 79 MMHG | TEMPERATURE: 97.9 F | SYSTOLIC BLOOD PRESSURE: 152 MMHG

## 2022-04-09 LAB
A/G RATIO: 1.3 (ref 1.1–2.2)
ALBUMIN SERPL-MCNC: 4.1 G/DL (ref 3.4–5)
ALP BLD-CCNC: 75 U/L (ref 40–129)
ALT SERPL-CCNC: 18 U/L (ref 10–40)
ANION GAP SERPL CALCULATED.3IONS-SCNC: 11 MMOL/L (ref 3–16)
AST SERPL-CCNC: 17 U/L (ref 15–37)
BILIRUB SERPL-MCNC: 0.8 MG/DL (ref 0–1)
BUN BLDV-MCNC: 18 MG/DL (ref 7–20)
CALCIUM SERPL-MCNC: 9.3 MG/DL (ref 8.3–10.6)
CHLORIDE BLD-SCNC: 102 MMOL/L (ref 99–110)
CHOLESTEROL, TOTAL: 123 MG/DL (ref 0–199)
CO2: 26 MMOL/L (ref 21–32)
CREAT SERPL-MCNC: 1.5 MG/DL (ref 0.9–1.3)
EKG ATRIAL RATE: 56 BPM
EKG DIAGNOSIS: NORMAL
EKG P AXIS: 35 DEGREES
EKG P-R INTERVAL: 202 MS
EKG Q-T INTERVAL: 404 MS
EKG QRS DURATION: 90 MS
EKG QTC CALCULATION (BAZETT): 389 MS
EKG R AXIS: 39 DEGREES
EKG T AXIS: 16 DEGREES
EKG VENTRICULAR RATE: 56 BPM
GFR AFRICAN AMERICAN: 59
GFR NON-AFRICAN AMERICAN: 48
GLUCOSE BLD-MCNC: 109 MG/DL (ref 70–99)
HCT VFR BLD CALC: 38.9 % (ref 40.5–52.5)
HDLC SERPL-MCNC: 40 MG/DL (ref 40–60)
HEMOGLOBIN: 13.1 G/DL (ref 13.5–17.5)
LDL CHOLESTEROL CALCULATED: 60 MG/DL
LV EF: 66 %
LVEF MODALITY: NORMAL
MCH RBC QN AUTO: 32 PG (ref 26–34)
MCHC RBC AUTO-ENTMCNC: 33.6 G/DL (ref 31–36)
MCV RBC AUTO: 95.2 FL (ref 80–100)
PDW BLD-RTO: 13.4 % (ref 12.4–15.4)
PLATELET # BLD: 179 K/UL (ref 135–450)
PMV BLD AUTO: 8.1 FL (ref 5–10.5)
POTASSIUM REFLEX MAGNESIUM: 4.2 MMOL/L (ref 3.5–5.1)
RBC # BLD: 4.09 M/UL (ref 4.2–5.9)
SODIUM BLD-SCNC: 139 MMOL/L (ref 136–145)
TOTAL PROTEIN: 7.3 G/DL (ref 6.4–8.2)
TRIGL SERPL-MCNC: 113 MG/DL (ref 0–150)
TROPONIN: <0.01 NG/ML
VLDLC SERPL CALC-MCNC: 23 MG/DL
WBC # BLD: 5.7 K/UL (ref 4–11)

## 2022-04-09 PROCEDURE — 6370000000 HC RX 637 (ALT 250 FOR IP): Performed by: PHYSICIAN ASSISTANT

## 2022-04-09 PROCEDURE — 2580000003 HC RX 258: Performed by: HOSPITALIST

## 2022-04-09 PROCEDURE — 78452 HT MUSCLE IMAGE SPECT MULT: CPT | Performed by: INTERNAL MEDICINE

## 2022-04-09 PROCEDURE — 93017 CV STRESS TEST TRACING ONLY: CPT | Performed by: INTERNAL MEDICINE

## 2022-04-09 PROCEDURE — 80061 LIPID PANEL: CPT

## 2022-04-09 PROCEDURE — 85027 COMPLETE CBC AUTOMATED: CPT

## 2022-04-09 PROCEDURE — 3430000000 HC RX DIAGNOSTIC RADIOPHARMACEUTICAL: Performed by: HOSPITALIST

## 2022-04-09 PROCEDURE — A9502 TC99M TETROFOSMIN: HCPCS | Performed by: HOSPITALIST

## 2022-04-09 PROCEDURE — 93005 ELECTROCARDIOGRAM TRACING: CPT | Performed by: HOSPITALIST

## 2022-04-09 PROCEDURE — 94760 N-INVAS EAR/PLS OXIMETRY 1: CPT

## 2022-04-09 PROCEDURE — 99234 HOSP IP/OBS SM DT SF/LOW 45: CPT | Performed by: HOSPITALIST

## 2022-04-09 PROCEDURE — 6370000000 HC RX 637 (ALT 250 FOR IP): Performed by: HOSPITALIST

## 2022-04-09 PROCEDURE — 93010 ELECTROCARDIOGRAM REPORT: CPT | Performed by: INTERNAL MEDICINE

## 2022-04-09 PROCEDURE — G0378 HOSPITAL OBSERVATION PER HR: HCPCS

## 2022-04-09 PROCEDURE — 80053 COMPREHEN METABOLIC PANEL: CPT

## 2022-04-09 PROCEDURE — 36415 COLL VENOUS BLD VENIPUNCTURE: CPT

## 2022-04-09 PROCEDURE — 84484 ASSAY OF TROPONIN QUANT: CPT

## 2022-04-09 RX ORDER — SPIRONOLACTONE 25 MG/1
25 TABLET ORAL DAILY
Status: DISCONTINUED | OUTPATIENT
Start: 2022-04-09 | End: 2022-04-09

## 2022-04-09 RX ORDER — LOSARTAN POTASSIUM 100 MG/1
100 TABLET ORAL DAILY
Status: DISCONTINUED | OUTPATIENT
Start: 2022-04-09 | End: 2022-04-09 | Stop reason: HOSPADM

## 2022-04-09 RX ORDER — SPIRONOLACTONE 25 MG/1
25 TABLET ORAL DAILY
Status: DISCONTINUED | OUTPATIENT
Start: 2022-04-09 | End: 2022-04-09 | Stop reason: HOSPADM

## 2022-04-09 RX ORDER — SPIRONOLACTONE 25 MG/1
25 TABLET ORAL DAILY
Qty: 30 TABLET | Refills: 1 | Status: SHIPPED | OUTPATIENT
Start: 2022-04-09

## 2022-04-09 RX ADMIN — LOSARTAN POTASSIUM 100 MG: 100 TABLET, FILM COATED ORAL at 14:33

## 2022-04-09 RX ADMIN — Medication 10 ML: at 10:53

## 2022-04-09 RX ADMIN — TETROFOSMIN 30 MILLICURIE: 1.38 INJECTION, POWDER, LYOPHILIZED, FOR SOLUTION INTRAVENOUS at 08:50

## 2022-04-09 RX ADMIN — ASPIRIN 81 MG 81 MG: 81 TABLET ORAL at 09:43

## 2022-04-09 RX ADMIN — SPIRONOLACTONE 25 MG: 25 TABLET ORAL at 13:12

## 2022-04-09 RX ADMIN — TETROFOSMIN 10 MILLICURIE: 1.38 INJECTION, POWDER, LYOPHILIZED, FOR SOLUTION INTRAVENOUS at 07:42

## 2022-04-09 ASSESSMENT — PAIN SCALES - GENERAL: PAINLEVEL_OUTOF10: 0

## 2022-04-09 ASSESSMENT — PAIN DESCRIPTION - PAIN TYPE: TYPE: ACUTE PAIN

## 2022-04-09 NOTE — PLAN OF CARE
Problem: Falls - Risk of:  Goal: Will remain free from falls  Description: Will remain free from falls  Outcome: Ongoing  Goal: Absence of physical injury  Description: Absence of physical injury  Outcome: Ongoing     Problem: Pain:  Goal: Pain level will decrease  Description: Pain level will decrease  Outcome: Ongoing  Goal: Control of acute pain  Description: Control of acute pain  Outcome: Ongoing  Goal: Control of chronic pain  Description: Control of chronic pain  Outcome: Ongoing     Problem: Pain:  Goal: Pain level will decrease  Description: Pain level will decrease  Outcome: Ongoing

## 2022-04-09 NOTE — CONSULTS
Nephrology Consult Note                                                                                                                                                                                                                                                                                                                                                               Office : [phone]     Fax :[phone]              Patient's Name: Maurice Stephens  8:59 PM  4/9/2022    Reason for Consult:  HTN uncontrolled,  CKD  Requesting Physician:  Shantelle Lewis MD      Chief Complaint:  HTN uncontrolled  History of Present Ilness:    Maruice Stephens is a 55 y.o. male with  PMH of HTN     Presented with c/o high blood pressure    Takes medicine as prescribed    Takes Losartan at home    C/o chest pressure    ED : /121    S/p clonidine , chlorthalidone in ED    SBP improved to 130s     Denies taking NSAIDs            Past Medical History:   Diagnosis Date    Arthritis     Bilateral renal cysts 6/3/2013    US   6/3/13     DJD (degenerative joint disease) 8/23/2011    Elevated uric acid in blood 5/22/2013    GERD (gastroesophageal reflux disease)     Gout 2013    HTN (hypertension) 5/22/2013    Hypertension     Movement disorder     gout; left knee along w/generallized arthritis    Prostate cancer ([facility] [address] 2/24/16    Renal insufficiency 6/1/2013    TIA (transient ischemic attack)        Past Surgical History:   Procedure Laterality Date    COLONOSCOPY  3/10/16    repeat 10 yrs    ELBOW SURGERY  2010    [facility]    ENDOSCOPY, COLON, DIAGNOSTIC      FOOT SURGERY  1999    left    PROSTATECTOMY  2016    [place]    PROSTATECTOMY  3/28/16    robotic laparoscopic radical     WRIST SURGERY         Family History   Problem Relation Age of Onset    Lupus Mother     Stroke Mother     Other Mother         COPD        reports that he has never smoked.  He has never used smokeless tobacco. He reports that he does not drink alcohol and does not use drugs. Allergies:  Aleve [naproxen sodium], Orellana, and Norvasc [amlodipine besylate]    Current Medications:    No current facility-administered medications for this encounter. Review of Systems:   14 point ROS obtained but were negative except mentioned in HPI      Physical exam:     Vitals:  BP (!) 152/79   Pulse 64   Temp 97.9 °F (36.6 °C) (Oral)   Resp 16   Ht 5' 9\" (1.753 m)   Wt 297 lb (134.7 kg)   SpO2 95%   BMI 43.86 kg/m²   Constitutional:  OAA X3 NAD  Skin: no rash, turgor wnl  Heent:  eomi, mmm  Neck: no bruits or jvd noted  Cardiovascular:  S1, S2 without m/r/g  Respiratory: CTA B without w/r/r  Abdomen:  +bs, soft, nt, nd  Ext: no  lower extremity edema  Psychiatric: mood and affect appropriate  Musculoskeletal:  Rom, muscular strength intact    Data:   Labs:  CBC:   Recent Labs     04/08/22  1441 04/09/22  0614   WBC 5.2 5.7   HGB 13.6 13.1*    179     BMP:    Recent Labs     04/08/22  1441 04/09/22  0614    139   K 4.8 4.2    102   CO2 27 26   BUN 14 18   CREATININE 1.6* 1.5*   GLUCOSE 87 109*     Ca/Mg/Phos:   Recent Labs     04/08/22  1441 04/09/22  0614   CALCIUM 9.7 9.3     Hepatic:   Recent Labs     04/09/22  0614   AST 17   ALT 18   BILITOT 0.8   ALKPHOS 75     Troponin:   Recent Labs     04/08/22  1730 04/08/22  2111 04/09/22  0024   TROPONINI <0.01 <0.01 <0.01     BNP: No results for input(s): BNP in the last 72 hours. Lipids:   Recent Labs     04/09/22  0614   CHOL 123   TRIG 113   HDL 40   LDLCALC 60   LABVLDL 23     ABGs: No results for input(s): PHART, PO2ART, YTC6IUJ in the last 72 hours. INR: No results for input(s): INR in the last 72 hours. UA:No results for input(s): Nuha Garrido, GLUCOSEU, BILIRUBINUR, Ranelle Fought, BLOODU, PHUR, PROTEINU, UROBILINOGEN, NITRU, LEUKOCYTESUR, LABMICR, URINETYPE in the last 72 hours.    Urine Microscopic: No results for input(s): LABCAST, BACTERIA, COMU, HYALCAST, GABRIELLE WesleyU in the last 72 hours. Urine Culture: No results for input(s): LABURIN in the last 72 hours. Urine Chemistry: No results for input(s): Lucillie Nateer, PROTEINUR, NAUR in the last 72 hours. IMAGING:  NM Cardiac Stress Test Nuclear Imaging   Final Result      XR CHEST PORTABLE   Final Result   No acute abnormality.              Assessment/Plan     CKD 3a    Etiology : HTN    UA : no protein    Denies taking NSAIDs at home    No pedal edema    HTN uncontrolled    Chest pain    Plan    BP better controlled    SBP went upto 200 mmhg during stress test today    Serum Cr seems at baseline    Can resume Losartan at home dose    Add Aldactone 25 mg po daily    Low salt diet discussed    Follow up with Dr Jessi Aguayo in [address] in 2- 3 weeks                  Thank you for allowing us to participate in care of Shorty Amanda MD  Feel free to contact me   [facility] [address]  Office : [phone]  Fax :[phone]

## 2022-04-09 NOTE — PROGRESS NOTES
Pt educated on Gregorio stress test protocol including complications/risks. All questions answered at this time.

## 2022-04-09 NOTE — PROGRESS NOTES
Pt arrived on unit. Placed on tele, oriented to room. Bed lowered, side table and call light within reach. Pt denies any needs.  Will continue to monitor

## 2022-04-09 NOTE — PROGRESS NOTES
Discharge instructions reviewed with patient. Verbalized understanding. IV dc'd without complications. Tele box returned to nurse's station. Patient refused to be taken to ED entrance via wheelchair. Walked independently to the elevator.

## 2022-04-09 NOTE — CARE COORDINATION
DISCHARGE PLANNING NOTE;  Patient admitted as Observation with an anticipated short hospitalization length of stay. Chart reviewed and it appears that patient has minimal needs for discharge at this time. Discussed with patients nurse and requested that case management be notified if discharge needs are identified. Case management will continue to follow progress and update discharge plan as needed.

## 2022-04-09 NOTE — DISCHARGE SUMMARY
[facility]    Patient Demographics    Amy Hernandez  Date of Birth. 1965  MRN. [id number]     Primary care provider. Kojo Oro MD  (Tel: [phone])    Admit date: 4/8/2022    Discharge date (blank if same as Note Date): Note Date: 4/9/2022     Reason for Hospitalization. Chief Complaint   Patient presents with    Hypertension     Came in for c/o high blood pressure, taking meds as prescribed. States \"heaviness in my chest\". Significant Findings. Principal Problem:    Chest pain  Active Problems:    Hypertension, essential    Prostate cancer ([address]    Mixed hyperlipidemia    Adjustment disorder with mixed anxiety and depressed mood    PTSD (post-traumatic stress disorder)  Resolved Problems:    * No resolved hospital problems. *       Problems and results from this hospitalization that need follow up. 1. hypertension    Significant test results and incidental findings. 1. GXT   Summary    Normal myocardial perfusion study with normal left ventricular function,    size, and wall motion.    The estimated left ventricular function is 66%. Invasive procedures and treatments. 1. None    Problem-based Hospital Course. Patient is a 54 yo male with hypertension who came to the hospital with retrosternal chest pain. BP in the ED was 177/121. Initial cardiac workup was normal. He was admitted and had normal troponins. Stress test was negative for ischemic or infarct. He was seen by nephrology due to ckd and htn. He was started on spironolactone. Bp is stable at time of dc. Consults. IP CONSULT TO HOSPITALIST  IP CONSULT TO NEPHROLOGY    Physical examination on discharge day. /84   Pulse 62   Temp 97.9 °F (36.6 °C) (Oral)   Resp 16   Ht 5' 9\" (1.753 m)   Wt 297 lb (134.7 kg)   SpO2 95%   BMI 43.86 kg/m²   General appearance. Alert.  Looks comfortable. HEENT. Sclera clear. Moist mucus membranes. Cardiovascular. Regular rate and rhythm, normal S1, S2. No murmur. Respiratory. Not using accessory muscles. Clear to auscultation bilaterally, no wheeze. Gastrointestinal. Abdomen soft, non-tender, not distended, normal bowel sounds  Neurology. Facial symmetry. No speech deficits. Moving all extremities equally. Extremities. No edema in lower extremities. Skin. Warm, dry, normal turgor    Condition at time of discharge stable    Medication instructions provided to patient at discharge. Medication List      START taking these medications    spironolactone 25 MG tablet  Commonly known as: ALDACTONE  Take 1 tablet by mouth daily  Notes to patient: USE: \"water pill\" reduces swelling, fluid accumulation, blood pressure but holds potassium     Side Effects: lightheadedness, dehydration, lethargy        CONTINUE taking these medications    acetaminophen 325 MG tablet  Commonly known as: TYLENOL  Notes to patient: Use: reduction in pain  Side effects: nausea, vomiting     allopurinol 100 MG tablet  Commonly known as: ZYLOPRIM  Take 2 tablets by mouth daily  Notes to patient: Use: for prevention of gout flare up  Side effects: skin rash. diarrhea. nausea. changes in your liver function test results. Aspirin Low Dose 81 MG chewable tablet  Generic drug: aspirin  CHEW ONE TABLET BY MOUTH DAILY  Notes to patient: Aspirin  Use: Prevents heart attacks & strokes. Side effects: bleeding or bruising more easily, upset stomach.      atorvastatin 10 MG tablet  Commonly known as: LIPITOR  TAKE ONE TABLET BY MOUTH ONCE NIGHTLY  Notes to patient:    Atorvastatin (Lipitor®)  Use: lower bad cholesterol   Side effects: headache, muscle pains, constipation, diarrhea.     loratadine 10 MG tablet  Commonly known as: Claritin  Take 1 tablet by mouth daily  Notes to patient: Use: for treatment of allergy symptoms such as sneezing, watery eyes, nasal congestion  Side effects: headache, dizziness, fast heartbeat     losartan 100 MG tablet  Commonly known as: COZAAR  TAKE ONE TABLET BY MOUTH DAILY  Notes to patient: Use:Lowering high blood pressure   Side effects:dizziness. stuffy nose. back pain. diarrhea. fatigue. low blood sugar     mirtazapine 15 MG tablet  Commonly known as: REMERON  Take 1 tablet by mouth nightly  Notes to patient: Antidepressant  Use: Major depressive disorder  Side Effects: Fatigue, blurred vision, polyphagia     mometasone 110 MCG/INH Aepb  Commonly known as: Asmanex (30 Metered Doses)  Inhale 2 puffs into the lungs 2 times daily  Notes to patient: Use: inhaler for treatment of chronic lung disease (COPD)  Side effects: cough, headache, sore throat, runny nose, general body aches     pantoprazole 40 MG tablet  Commonly known as: PROTONIX  TAKE ONE TABLET BY MOUTH EVERY MORNING BEFORE BREAKFAST  Notes to patient: Pantoprozole/ Protonix  Use: reduces stomach acid, protects the digestive tract  Side effects: headache or diarrhea     sertraline 100 MG tablet  Commonly known as: ZOLOFT  Take 1 tablet by mouth daily  Notes to patient: Use: to treat depression  Side effects: confusion, drowsiness, restlessness, sexual dysfunction            Where to Get Your Medications      These medications were sent to [facility] [address] [place] [address] [phone] [address] [facility], [address] [place] [address] Avenue    Phone: [phone]   · spironolactone 25 MG tablet         Discharge recommendations given to patient. Follow Up. PCP in 1 week, Nephrology in 2 weeks   Disposition. home  Activity. activity as tolerated  Diet: ADULT DIET; Regular; Low Fat/Low Chol/High Fiber/NICOLE      Spent 31 minutes in discharge process. Signed:   Ulysses Posey, PA-C     4/9/2022 1:47 PM

## 2022-04-11 ENCOUNTER — NURSE TRIAGE (OUTPATIENT)
Dept: OTHER | Facility: CLINIC | Age: 57
End: 2022-04-11

## 2022-04-11 ENCOUNTER — OFFICE VISIT (OUTPATIENT)
Dept: FAMILY MEDICINE CLINIC | Age: 57
End: 2022-04-11
Payer: COMMERCIAL

## 2022-04-11 ENCOUNTER — HOSPITAL ENCOUNTER (OUTPATIENT)
Dept: PSYCHIATRY | Age: 57
Setting detail: THERAPIES SERIES
Discharge: HOME | End: 2022-04-11
Payer: COMMERCIAL

## 2022-04-11 ENCOUNTER — HOSPITAL ENCOUNTER (EMERGENCY)
Age: 57
Discharge: HOME | End: 2022-04-11
Attending: EMERGENCY MEDICINE

## 2022-04-11 ENCOUNTER — TELEPHONE (OUTPATIENT)
Dept: FAMILY MEDICINE CLINIC | Age: 57
End: 2022-04-11

## 2022-04-11 VITALS
HEART RATE: 79 BPM | OXYGEN SATURATION: 98 % | BODY MASS INDEX: 43.95 KG/M2 | WEIGHT: 297.6 LBS | SYSTOLIC BLOOD PRESSURE: 120 MMHG | DIASTOLIC BLOOD PRESSURE: 80 MMHG

## 2022-04-11 VITALS — SYSTOLIC BLOOD PRESSURE: 165 MMHG | HEART RATE: 70 BPM | DIASTOLIC BLOOD PRESSURE: 108 MMHG

## 2022-04-11 VITALS
RESPIRATION RATE: 16 BRPM | HEIGHT: 69 IN | DIASTOLIC BLOOD PRESSURE: 105 MMHG | TEMPERATURE: 98.7 F | SYSTOLIC BLOOD PRESSURE: 158 MMHG | OXYGEN SATURATION: 97 % | BODY MASS INDEX: 43.69 KG/M2 | HEART RATE: 69 BPM | WEIGHT: 295 LBS

## 2022-04-11 DIAGNOSIS — F33.2 MAJOR DEPRESSIVE DISORDER, RECURRENT SEVERE WITHOUT PSYCHOTIC FEATURES (HCC): ICD-10-CM

## 2022-04-11 DIAGNOSIS — I10 HYPERTENSION, ESSENTIAL: Primary | ICD-10-CM

## 2022-04-11 DIAGNOSIS — Z53.21 PATIENT LEFT WITHOUT BEING SEEN: Primary | ICD-10-CM

## 2022-04-11 PROCEDURE — 99213 OFFICE O/P EST LOW 20 MIN: CPT | Performed by: FAMILY MEDICINE

## 2022-04-11 PROCEDURE — 90853 GROUP PSYCHOTHERAPY: CPT

## 2022-04-11 RX ORDER — HYDRALAZINE HYDROCHLORIDE 10 MG/1
10 TABLET, FILM COATED ORAL EVERY 12 HOURS PRN
Qty: 30 TABLET | Refills: 0 | Status: SHIPPED | OUTPATIENT
Start: 2022-04-11 | End: 2023-04-11

## 2022-04-11 NOTE — TELEPHONE ENCOUNTER
[facility] [employer] [facility]. Subjective: Caller states \"I was seen in the hospital on Friday and Saturday for high blood pressure. They gave me medication in the ER and kept me over night. They added a water pill to my medications of what I normally take. I feel very fatigued and pressure on the chest right in the middle. My blood pressure today is 167/129. I have been taking my bp medications including the water pill. \"     Current Symptoms: chest pressure, hypertension    Onset: 3 days ago; worsening    Associated Symptoms: NA    Pain Severity: 5/10; pressure; constant    Temperature: none     What has been tried: bp medications    LMP: NA Pregnant: NA    Recommended disposition: Go to ED/UCC Now (Or to Office with PCP Approval)    Care advice provided, patient verbalizes understanding; denies any other questions or concerns; instructed to call back for any new or worsening symptoms. Writer provided warm transfer to Lex Benton at [address] for hypertension, chest pressure     Attention Provider: Thank you for allowing me to participate in the care of your patient. The patient was connected to triage in response to information provided to the ECC/PSC. Please do not respond through this encounter as the response is not directed to a shared pool.       Reason for Disposition   Systolic BP >= 286 OR Diastolic >= 727, and any cardiac or neurologic symptoms (e.g., chest pain, difficulty breathing, unsteady gait, blurred vision)    Protocols used: BLOOD PRESSURE - HIGH-ADULT-OH

## 2022-04-11 NOTE — GROUP NOTE
Group Therapy Note    Date: 4/11/2022    Group Start Time:  8:30 AM  Group End Time:  9:45 AM  Group Topic: Psychotherapy    KITA LEACH I    [address]        Group Therapy Note    Patient will set agenda for engagement in psychotherapy, use structure of group dynamics to explore behaviors in healthy communication and relationships. Attendees: 7         Notes:  Shila Zuluaga started group discussing his weekend, reporting that he came to the hospital and required treatment for his high blood pressure. Shila Zuluaga reported that he had increased stress throughout the weekend with inability to self-soothe and use coping strategies. Shila Zuluaga was mostly silent during session, remained attentive to peers across all discussions while endorsing positive affirmations of group. Status After Intervention:  Improved    Participation Level:  Active Listener    Participation Quality: Appropriate and Attentive      Speech:  normal      Thought Process/Content: Logical      Affective Functioning: Congruent      Mood: depressed      Level of consciousness:  Alert and Attentive      Response to Learning: Capable of insight and Progressing to goal      Endings: None Reported    Modes of Intervention: Support, Socialization, Exploration, Clarifying and Problem-solving      Discipline Responsible: Psychoeducational Specialist      Signature:  [address], MM, MT-BC

## 2022-04-11 NOTE — TELEPHONE ENCOUNTER
----- Message from Phani Tabor sent at 4/11/2022 12:37 PM EDT -----  Subject: Message to Provider    QUESTIONS  Information for Provider? Pt. states he is currently at [facility] and will go to the ER. The latest BP was 162/107.  ---------------------------------------------------------------------------  --------------  CALL BACK INFO  What is the best way for the office to contact you? OK to leave message on   voicemail  Preferred Call Back Phone Number? [phone]  ---------------------------------------------------------------------------  --------------  SCRIPT ANSWERS  Relationship to Patient?  Self

## 2022-04-11 NOTE — GROUP NOTE
Group Therapy Note    Date: 4/11/2022    Group Start Time: 10:00 AM  Group End Time: 11:00 AM  Group Topic: Psychoeducation    [facility]    JENNIFFER Fisher        Group Therapy Note  The clinician introduced the Richard-Cowan Type Indicator Scale to the group. All of the group members completed the scale and found their personality types. The group members who have already taken the VIA 24 Character Strengths scale and the Love Language scale were able to testify that with all three of the scales, results along with the skills they have learned in the group have helped them understand themselves better. Attendees: 9         Patient's Goal:      Notes:  Patient was cooperative and fully engaged in the group activity and discussion. He was able to complete the self-assessment and discover his dominant personality type. Status After Intervention:  Improved    Participation Level:  Active Listener    Participation Quality: Attentive      Speech:  normal      Thought Process/Content: Linear      Affective Functioning: Flat      Mood: depressed      Level of consciousness:  Alert      Response to Learning: Able to verbalize/acknowledge new learning      Endings: None Reported    Modes of Intervention: Education, Exploration and Activity      Discipline Responsible: /Counselor      Signature:  JENNIFFER Fisher

## 2022-04-11 NOTE — PROGRESS NOTES
Mir Mccoy is a 55 y.o. male. HPI:  Here for BP check   Increased dose of Zoloft finally kicking in, seems less depressed and anxious  In  support group 3 times a week    Blood pressure medicine better today   Nuclear stress test recently when he was in the emergency room was negative    Meds, vitamins and allergies reviewed with pt    Wt Readings from Last 3 Encounters:   04/11/22 295 lb (133.8 kg)   04/11/22 297 lb 9.6 oz (135 kg)   04/09/22 297 lb (134.7 kg)       REVIEW OF SYSTEMS:   CONSTITUTIONAL: See history of present illness,   Weight noted   HEENT: No new vision difficulties or ringing in the ears. RESPIRATORY: No new SOB, PND, orthopnea or cough. CARDIOVASCULAR: no CP, palpitations or SOB with exertion  GI: No nausea, vomiting, diarrhea, constipation, abdominal pain or changes in bowel habits. : No urinary frequency, urgency, incontinence hematuria or dysuria. SKIN: No cyanosis or skin lesions. MUSCULOSKELETAL: No new muscle or joint pain. NEUROLOGICAL: No syncope or TIA-like symptoms. PSYCHIATRIC: No anxiety, insomnia or depression     Allergies   Allergen Reactions    Aleve [Naproxen Sodium] Rash     \"pt takes aspirin at home\"    Norvasc [Amlodipine Besylate] Other (See Comments)     Palpitations/chest pressure       Prior to Visit Medications    Medication Sig Taking?  Authorizing Provider   hydrALAZINE (APRESOLINE) 10 MG tablet Take 1 tablet by mouth every 12 hours as needed (elevated BP) Yes Astrid Benjamin MD   spironolactone (ALDACTONE) 25 MG tablet Take 1 tablet by mouth daily Yes Krista Santiago PA-C   mirtazapine (REMERON) 15 MG tablet Take 1 tablet by mouth nightly Yes Tamar Garcia MD   sertraline (ZOLOFT) 100 MG tablet Take 1 tablet by mouth daily Yes Tamar Garcia MD   losartan (COZAAR) 100 MG tablet TAKE ONE TABLET BY MOUTH DAILY Yes Astrid Benjamin MD   pantoprazole (PROTONIX) 40 MG tablet TAKE ONE TABLET BY MOUTH EVERY MORNING BEFORE BREAKFAST Yes Rosa Maria LIND MD Alesha   atorvastatin (LIPITOR) 10 MG tablet TAKE ONE TABLET BY MOUTH ONCE NIGHTLY Yes Bedford Cooks, MD   allopurinol (ZYLOPRIM) 100 MG tablet Take 2 tablets by mouth daily Yes Bedford Cooks, MD   ASPIRIN LOW DOSE 81 MG chewable tablet CHEW ONE TABLET BY MOUTH DAILY Yes Rosa Maria Eduardo MD   mometasone (ASMANEX, 30 METERED DOSES,) 110 MCG/INH AEPB Inhale 2 puffs into the lungs 2 times daily Yes Bedford Cooks, MD   loratadine (CLARITIN) 10 MG tablet Take 1 tablet by mouth daily Yes Bedford Cooks, MD   acetaminophen (TYLENOL) 325 MG tablet Take 650 mg by mouth every 6 hours as needed for Pain (last dose 8am) Yes Historical Provider, MD       Past Medical History:   Diagnosis Date    Arthritis     Bilateral renal cysts 6/3/2013    US   6/3/13     DJD (degenerative joint disease) 8/23/2011    Elevated uric acid in blood 5/22/2013    GERD (gastroesophageal reflux disease)     Gout 2013    HTN (hypertension) 5/22/2013    Hypertension     Movement disorder     gout; left knee along w/generallized arthritis    Prostate cancer ([address] 2/24/16    Renal insufficiency 6/1/2013    TIA (transient ischemic attack)        Social History     Tobacco Use    Smoking status: Never Smoker    Smokeless tobacco: Never Used   Substance Use Topics    Alcohol use: No       Family History   Problem Relation Age of Onset    Lupus Mother     Stroke Mother     Other Mother         COPD       OBJECTIVE:  /80   Pulse 79   Wt 297 lb 9.6 oz (135 kg)   SpO2 98%   BMI 43.95 kg/m²   GEN:  in NAD, improved eye contact  HEENT:  NCAT  NECK:  Supple without adenopathy. CV:  Regular rate and rhythm, S1 and S2 normal, no murmurs, clicks  PULM:  Chest is clear, no wheezing ,  symmetric air entry throughout both lung fields. ABD: Soft, NT, no masses appreciated  EXT: No rash or edema  NEURO: Alert oriented ×3, nonfocal, no assistive    ASSESSMENT/PLAN:  1.  Hypertension, essential  Take losartan in the morning and spironolactone

## 2022-04-11 NOTE — ED PROVIDER NOTES
I signed up for the patient and was headed to see him and was informed by nursing that he had left the emergency department. He left prior to my evaluation.      Collins, [address] [place] [address]  04/11/22 [id number]

## 2022-04-11 NOTE — GROUP NOTE
Group Therapy Note    Date: 4/11/2022    Group Start Time: 11:15 AM  Group End Time: 12:15 PM  Group Topic: Music Therapy    [facility]    Edwin Cristobal        Group Therapy Note    Topic: Coping with Loss of Control    Mode of Intervention: Alejandra Analysis    Music Therapy group utilized alejandra analysis and verbal process of control, coping with loss of control, and ways to gain control through recovery. Pts then engaged in [place], replacing lyrics with self-written reflection on relationship with control through recovery. Attendees: 9         Notes:  Ирина Morales was present and actively engaged across group discussions, reflective while processing role of \"looking out for yourself\" in eliciting positive reinforcement from loved ones. Status After Intervention:  Improved    Participation Level:  Active Listener and Interactive    Participation Quality: Appropriate and Sharing      Speech:  normal      Thought Process/Content: Logical      Affective Functioning: Flat      Mood: dysphoric, fearful and irritable      Level of consciousness:  Alert      Response to Learning: Capable of insight and Progressing to goal      Endings: None Reported    Modes of Intervention: Education, Socialization and Media      Discipline Responsible: Psychoeducational Specialist      Signature:  Destinee Xavier MM, MT-BC

## 2022-04-12 ENCOUNTER — CARE COORDINATION (OUTPATIENT)
Dept: CARE COORDINATION | Age: 57
End: 2022-04-12

## 2022-04-12 ENCOUNTER — TELEPHONE (OUTPATIENT)
Dept: FAMILY MEDICINE CLINIC | Age: 57
End: 2022-04-12

## 2022-04-12 NOTE — CARE COORDINATION
Outreach attempt 1 to follow up on ED visit, to introduce patient to care coordination and explain role of ACM. Patient was unable to reach, ACM left HIPAA compliant message with contact information.       Plan   Outreach attempt 2 to follow up on ED visit, to introduce patient to care coordination and explain role of Sergio Grier  [address] [place] [address] Primary Care

## 2022-04-12 NOTE — TELEPHONE ENCOUNTER
Top number is 145 over 100 does patient still need to take hydralazine? Please advise  Patient's BP today is 151/111.

## 2022-04-13 ENCOUNTER — CARE COORDINATION (OUTPATIENT)
Dept: CARE COORDINATION | Age: 57
End: 2022-04-13

## 2022-04-13 NOTE — CARE COORDINATION
Outreach attempt 2 to follow up on ED visit, to introduce patient to care coordination and explain role of ACM.  Patient was unable to reach, ACM left HIPAA compliant message with contact information.        Plan   Outreach attempt 3 to follow up on ED visit, to introduce patient to care coordination and explain role of [address] Manager  [phone]  [address]  [address] [place] [address] Primary [facility] [place]

## 2022-04-14 ENCOUNTER — HOSPITAL ENCOUNTER (OUTPATIENT)
Dept: PSYCHIATRY | Age: 57
Setting detail: THERAPIES SERIES
Discharge: HOME | End: 2022-04-14
Payer: COMMERCIAL

## 2022-04-14 DIAGNOSIS — F32.2 CURRENT SEVERE EPISODE OF MAJOR DEPRESSIVE DISORDER WITHOUT PSYCHOTIC FEATURES WITHOUT PRIOR EPISODE (HCC): ICD-10-CM

## 2022-04-14 PROCEDURE — 90853 GROUP PSYCHOTHERAPY: CPT

## 2022-04-14 PROCEDURE — 90853 GROUP PSYCHOTHERAPY: CPT | Performed by: SOCIAL WORKER

## 2022-04-14 NOTE — GROUP NOTE
Group Therapy Note    Date: 4/14/2022    Group Start Time: 10:00 AM  Group End Time: 10:45 AM  Group Topic: Psychoeducation    [facility]    MAREK Randolph        Group Therapy Note    Attendees: 8    Participants learned about Negative Unconscious Thoughts and Automatic Negative Thoughts. They completed a worksheet to identify those self defeating thoughts. They were asked to share one negative thought they will work on over the next week. Notes:  Triston Cast attended group and was engaged throughout. Triston Cast said he struggles with his weight and body image as well as anger. Triston Cast said he would like to work on not perseverating about things that are out of his control.      Status After Intervention:  Improved    Participation Level: Interactive    Participation Quality: Appropriate, Attentive and Sharing      Speech:  normal      Thought Process/Content: Logical      Affective Functioning: Congruent      Mood: euthymic      Level of consciousness:  Alert, Oriented x4 and Attentive      Response to Learning: Able to verbalize/acknowledge new learning, Capable of insight and Progressing to goal      Endings: None Reported    Modes of Intervention: Education and Activity      Discipline Responsible: /Counselor      Signature:  MAREK Carrillo

## 2022-04-14 NOTE — GROUP NOTE
Group Therapy Note    Date: 4/14/2022    Group Start Time:  8:30 AM  Group End Time:  9:45 AM  Group Topic: Psychotherapy    [facility]NABZ [facility]AYLEE I    [address]        Group Therapy Note    Patient will set agenda for engagement in psychotherapy, use structure of group dynamics to explore behaviors in healthy communication and relationships. Attendees: 7         Patient's Goal:  Swathi Cage did not set expectation during group. Notes:  Swathi Cage related to peer in group while discussing grief and coping with loss of parents, sharing his experiences while losing father in adolescence and mother in recent months. Swathi Cage shared that he is struggling with weighing the consequences of his actions on others in his life, tearful throughout disclosure while receiving validation and support from peers. Status After Intervention:  Improved    Participation Level:  Active Listener and Interactive    Participation Quality: Sharing and Supportive      Speech:  normal      Thought Process/Content: Logical      Affective Functioning: Congruent      Mood: depressed      Level of consciousness:  Alert and Attentive      Response to Learning: Able to verbalize current knowledge/experience, Able to verbalize/acknowledge new learning and Progressing to goal      Endings: None Reported    Modes of Intervention: Support, Socialization, Exploration, Clarifying and Problem-solving      Discipline Responsible: Psychoeducational Specialist      Signature:  [address], [place], [place]

## 2022-04-14 NOTE — GROUP NOTE
Group Therapy Note    Date: 4/14/2022    Group Start Time: 11:15 AM  Group End Time: 12:15 PM  Group Topic: Music Therapy    [facility]        Group Therapy Note    Group facilitator led process discussion of coping preferences, behaviors that interfere with coping strategies. Group members engaged in collaborative songwriting intervention rewriting lyrics to [employer]" while processing the role of distraction vs releasing coping strategies. Group conclude with members creating lists of both distraction coping and releasing coping, engaged in self-assessment of coping styles (over-controlled, under-controlled). Attendees: 8         Notes: This group member was present and actively engaged across interventions, collaborative with peers in songwriting interventions as well as completing lists of distraction coping and releasing coping strategies. This group member completed the self-assessment, collaborated with peers in processing impulses and self-sabotage in coping. Status After Intervention:  Improved    Participation Level:  Active Listener and Interactive    Participation Quality: Appropriate, Sharing and Supportive      Speech:  normal      Thought Process/Content: Logical      Affective Functioning: Congruent      Mood: euthymic      Level of consciousness:  Alert and Attentive      Response to Learning: Able to verbalize current knowledge/experience, Able to verbalize/acknowledge new learning and Progressing to goal      Endings: None Reported    Modes of Intervention: Support, Socialization, Exploration, Clarifying and Problem-solving      Discipline Responsible: Psychoeducational Specialist      Signature:  Clair Lamas MM, JING

## 2022-04-18 ENCOUNTER — HOSPITAL ENCOUNTER (OUTPATIENT)
Dept: PSYCHIATRY | Age: 57
Setting detail: THERAPIES SERIES
Discharge: HOME | End: 2022-04-18
Payer: COMMERCIAL

## 2022-04-18 DIAGNOSIS — F33.2 MAJOR DEPRESSIVE DISORDER, RECURRENT SEVERE WITHOUT PSYCHOTIC FEATURES (HCC): ICD-10-CM

## 2022-04-18 PROCEDURE — 90853 GROUP PSYCHOTHERAPY: CPT

## 2022-04-18 PROCEDURE — 90853 GROUP PSYCHOTHERAPY: CPT | Performed by: SOCIAL WORKER

## 2022-04-18 NOTE — GROUP NOTE
Group Therapy Note    Date: 4/18/2022    Group Start Time:  8:30 AM  Group End Time:  9:45 AM  Group Topic: Psychotherapy    [facility]    [address]        Group Therapy Note    Patient will set agenda for engagement in psychotherapy, use structure of group dynamics to explore behaviors in healthy communication and relationships. Attendees: 9         Patient's Goal:  Lucía Gonzales described present emotions as \"a mixed bag\", set agenda to \"do what feels right in the moment. \"    Notes:  Lucía Gonzales was consistently attentive to peers during session, reflecting on relationships with peers while a group member voiced suicidal thoughts and actions from past weekend. Lucía Gonzales was visibly distraught while hearing this group member discuss their experience, responding with \"I respect you, man. Don't do that to us. We'd never forget it if you did that to yourself. \"    Status After Intervention:  Improved    Participation Level:  Active Listener and Interactive    Participation Quality: Sharing and Supportive      Speech:  normal      Thought Process/Content: Logical      Affective Functioning: Congruent      Mood: euthymic      Level of consciousness:  Alert and Oriented x4      Response to Learning: Capable of insight and Progressing to goal      Endings: None Reported    Modes of Intervention: Support, Socialization, Exploration, Clarifying and Problem-solving      Discipline Responsible: Psychoeducational Specialist      Signature:  [address], [place], [place]-BC
Group Therapy Note    Date: 4/18/2022    Group Start Time: 10:15 AM  Group End Time: 11:00 AM  Group Topic: Psychoeducation    [facility]    MAREK Haas        Group Therapy Note    Attendees: 9    Participants learned about emotion regulation via Dialectical Behavioral Therapy method. Notes:  Dave Alvarez attended group, was engaged and participated in group discussion. Status After Intervention:  Improved    Participation Level:  Active Listener and Interactive    Participation Quality: Appropriate and Attentive      Speech:  normal      Thought Process/Content: Logical      Affective Functioning: Congruent      Mood: euthymic      Level of consciousness:  Alert, Oriented x4 and Attentive      Response to Learning: Able to verbalize/acknowledge new learning and Progressing to goal      Endings: None Reported    Modes of Intervention: Education      Discipline Responsible: /Counselor      Signature:  MAREK Michaels
Process/Content: Logical      Affective Functioning: Congruent      Mood: euthymic      Level of consciousness:  Oriented x4      Response to Learning: Able to verbalize current knowledge/experience      Endings: None Reported    Modes of Intervention: Support, Socialization and Exploration      Discipline Responsible: /Counselor      Signature:  JENNIFFER Powell

## 2022-04-19 NOTE — BH NOTE
Pt requesting medication refill on Zoloft and Remeron. Dr. Carroll Mart approved. Writer called in to pt's preferred pharmacy.

## 2022-04-19 NOTE — BH NOTE
Outpatient Treatment Team Progress Note  Date of Review: 4/12/2022        Multidisciplinary Outpatient Treatment Team met to discuss and review patient's progress in group and individual therapy sessions.         Presenting Problem: Depression, Grief     Patient's Current Treatment Status: Patient has increased engagement in group, demonstrating more assertive communication skills while creating a safe therapeutic environment for group members. Patient describes decreased symptoms of depression while continuing to cope with healthy issues related to HTN.      Treatment Start Date: 3/21/2022     Treatment Step-Down Date: n/a     Tentative Discharge Date: 4/29/2022

## 2022-04-19 NOTE — BH NOTE
Outpatient Treatment Team Progress Note  Date of Review: 4/19/2022      Multidisciplinary Outpatient Treatment Team met to discuss and review patient's progress in group and individual therapy sessions. Presenting Problem: PTSD    Patient's Current Treatment Status: Ophelia Matson is working on coping strategies and anxiety management.     Treatment Start Date: 3/21/2022    Treatment Step-Down Date:     Tentative Discharge Date: 4/29/2022

## 2022-04-21 ENCOUNTER — CARE COORDINATION (OUTPATIENT)
Dept: CARE COORDINATION | Age: 57
End: 2022-04-21

## 2022-04-21 ENCOUNTER — HOSPITAL ENCOUNTER (OUTPATIENT)
Dept: PSYCHIATRY | Age: 57
Setting detail: THERAPIES SERIES
Discharge: HOME | End: 2022-04-21
Payer: COMMERCIAL

## 2022-04-21 DIAGNOSIS — F33.2 MAJOR DEPRESSIVE DISORDER, RECURRENT SEVERE WITHOUT PSYCHOTIC FEATURES (HCC): ICD-10-CM

## 2022-04-21 PROCEDURE — 90853 GROUP PSYCHOTHERAPY: CPT

## 2022-04-21 PROCEDURE — 90853 GROUP PSYCHOTHERAPY: CPT | Performed by: SOCIAL WORKER

## 2022-04-21 NOTE — GROUP NOTE
Group Therapy Note    Date: 4/21/2022    Group Start Time:  8:00 AM  Group End Time:  9:45 AM  Group Topic: Psychotherapy    [facility]    JENNIFFER Medrano        Group Therapy Note  Group members engaged in a psychotherapy agenda group; identifying a personal agenda/goal and using interpersonal interactions among group members to develop goals and create connections among group members. Attendees: 10         Patient's Goal:  Patient made the goal to be able to laugh or smile before leaving the group. Notes:  He shared that he has been dealing with anger, depression, job issues and upcoming trigger dates that are causing his emotions to be all over the place. He shared this openly with the group members and stated he needs them to make him laugh to keep him from crying. Clinician pointed out the coping skills of using humor and focusing on positive thoughts. He was able to laugh several times in the group during conversations before group ended. Status After Intervention:  Improved    Participation Level:  Active Listener and Interactive    Participation Quality: Appropriate and Attentive      Speech:  normal      Thought Process/Content: Logical      Affective Functioning: Flat      Mood: depressed      Level of consciousness:  Attentive      Response to Learning: Able to verbalize/acknowledge new learning      Endings: None Reported    Modes of Intervention: Support, Exploration and Clarifying      Discipline Responsible: /Counselor      Signature:  JENNIFFER Medrano

## 2022-04-21 NOTE — GROUP NOTE
Group Therapy Note    Date: 4/21/2022    Group Start Time: 11:15 AM  Group End Time: 12:00 PM  Group Topic: Psychoeducation    [facility]    [address]        Group Therapy Note    Attendees: 10    Participants learned about and practiced grounding techniques to help relieve anxiety and panic attacks. Notes:  Danyel Britton attended group today and was engaged in learning about grounding techniques as well as practicing them through group activity.      Status After Intervention:  Improved    Participation Level: Interactive    Participation Quality: Appropriate and Attentive      Speech:  normal      Thought Process/Content: Logical      Affective Functioning: Congruent      Mood: euthymic      Level of consciousness:  Alert, Oriented x4 and Attentive      Response to Learning: Able to verbalize/acknowledge new learning and Progressing to goal      Endings: None Reported    Modes of Intervention: Education and Activity      Discipline Responsible: /Counselor      Signature:  [address]

## 2022-04-21 NOTE — CARE COORDINATION
Outreach attempt 3 to follow up on ED visit, to introduce patient to care coordination and explain role of ACM. Patient was unable to reach, ACM left HIPAA compliant message with contact information. No further outreach scheduled with this ACM. ACM left contact information if future needs arise.             [facility]  [phone]  [address] [place] [address] [facility]

## 2022-04-22 ENCOUNTER — HOSPITAL ENCOUNTER (OUTPATIENT)
Dept: PSYCHIATRY | Age: 57
Setting detail: THERAPIES SERIES
Discharge: HOME | End: 2022-04-22
Payer: COMMERCIAL

## 2022-04-22 DIAGNOSIS — F33.2 MAJOR DEPRESSIVE DISORDER, RECURRENT SEVERE WITHOUT PSYCHOTIC FEATURES (HCC): ICD-10-CM

## 2022-04-22 PROCEDURE — 90853 GROUP PSYCHOTHERAPY: CPT

## 2022-04-22 PROCEDURE — 90853 GROUP PSYCHOTHERAPY: CPT | Performed by: SOCIAL WORKER

## 2022-04-22 RX ORDER — SERTRALINE HYDROCHLORIDE 100 MG/1
150 TABLET, FILM COATED ORAL DAILY
Qty: 45 TABLET | Refills: 0 | Status: SHIPPED | OUTPATIENT
Start: 2022-04-22 | End: 2022-05-20 | Stop reason: SDUPTHER

## 2022-04-22 RX ORDER — PRAZOSIN HYDROCHLORIDE 1 MG/1
1 CAPSULE ORAL NIGHTLY
Qty: 30 CAPSULE | Refills: 0 | Status: SHIPPED | OUTPATIENT
Start: 2022-04-22 | End: 2022-05-20 | Stop reason: SDUPTHER

## 2022-04-22 NOTE — GROUP NOTE
Group Therapy Note    Date: 4/22/2022    Group Start Time:  8:30 AM  Group End Time:  9:45 AM  Group Topic: Psychotherapy    [facility]    [address]        Group Therapy Note    Attendees: 9    Participants engaged in group psychotherapy, explained their goals/agendas for the day and discussed their current struggles. Notes:  Rosanna Charles attended psychotherapy group this morning and he was engaged with the group but appeared lethargic. Rosanna Charles said his medications are about to be changed to include something that will help with his nightmares. Rosanna Charles reports he is still struggling with anger and depression.      Status After Intervention:  Improved    Participation Level: Interactive    Participation Quality: Appropriate, Attentive, Sharing and Lethargic      Speech:  normal      Thought Process/Content: Logical      Affective Functioning: Congruent      Mood: euthymic      Level of consciousness:  Alert, Oriented x4 and Attentive      Response to Learning: Capable of insight and Progressing to goal      Endings: None Reported    Modes of Intervention: Support, Socialization and Problem-solving      Discipline Responsible: /Counselor      Signature:  [address]

## 2022-04-22 NOTE — GROUP NOTE
Group Therapy Note    Date: 4/22/2022    Group Start Time: 11:15 AM  Group End Time: 12:00 PM  Group Topic: Psychoeducation    [facility]    MAREK Multani        Group Therapy Note    Attendees: 8    Participants participated in a group activity to show gratitude by creating messages of gratitude with sidewalk chalk for hospital personnel to see. Notes:  Chas Faye attended group and was engaged in the activity of writing words of gratitude for hospital staff on the pavement. Ronaldoas Neyda and a peer also wrote a note saying, [employer] are Free. \" The group members found this humorous but this clinician explained that it was not appropriate. This statement was cleaned off the pavement following group. Chas Faye also wrote \"Thank you\" several times.      Status After Intervention:  Improved    Participation Level: Interactive    Participation Quality: Appropriate and Inappropriate      Speech:  normal      Thought Process/Content: Logical      Affective Functioning: Congruent      Mood: euthymic      Level of consciousness:  Alert, Oriented x4 and Attentive      Response to Learning: Progressing to goal      Endings: None Reported    Modes of Intervention: Education, Socialization and Activity      Discipline Responsible: /Counselor      Signature:  MAREK Multani

## 2022-04-22 NOTE — BH NOTE
Pt requested Zoloft refill and c/o continued nightmares. Dr. Tony Feldman approved Zoloft refill for 150 mg daily and new order for Prazosin 1 mg HS. Writer called in to [facility] in [place] per pt request and notified pt. Cx with OP pharmacy.

## 2022-04-25 ENCOUNTER — HOSPITAL ENCOUNTER (OUTPATIENT)
Dept: PSYCHIATRY | Age: 57
Setting detail: THERAPIES SERIES
Discharge: HOME | End: 2022-04-25
Payer: COMMERCIAL

## 2022-04-25 DIAGNOSIS — F43.10 PTSD (POST-TRAUMATIC STRESS DISORDER): ICD-10-CM

## 2022-04-25 PROCEDURE — 90853 GROUP PSYCHOTHERAPY: CPT

## 2022-04-25 PROCEDURE — 90853 GROUP PSYCHOTHERAPY: CPT | Performed by: SOCIAL WORKER

## 2022-04-25 NOTE — GROUP NOTE
Group Therapy Note    Date: 4/25/2022    Group Start Time: 11:00 AM  Group End Time: 12:00 PM  Group Topic: Psychoeducation    [facility]    JENNIFFER Tsang        Group Therapy Note  Clinician introduced the Love Languages to the group. They members participated in the activity of putting their love languages in order from 1-5. They put their spouse/significant others love languages in order from 1-5 and discussed the differences and how they speak a different language. Attendees: 8         Patient's Goal:      Notes:  Patient was cooperative and fully engaged in the assessment, activity and discussion. He used humor in his responses. He had his hat high on his brow and lowered his mask today exposing his face for the first time in group today.      Status After Intervention:  Improved    Participation Level: Interactive    Participation Quality: Attentive      Speech:  normal      Thought Process/Content: Logical      Affective Functioning: Congruent      Mood: elevated      Level of consciousness:  Oriented x4      Response to Learning: Able to verbalize/acknowledge new learning      Endings: None Reported    Modes of Intervention: Education and Activity      Discipline Responsible: /Counselor      Signature:  JENNIFFER Tsang

## 2022-04-25 NOTE — GROUP NOTE
Group Therapy Note    Date: 4/25/2022    Group Start Time:  8:30 AM  Group End Time:  9:45 AM  Group Topic: Psychotherapy    [facility]      Group Therapy Note    Attendees: 9    Group members engaged in agenda setting psychotherapy group. Group members identified a personal agenda and utilized interpersonal interactions, focus on the here and now, and peer accountability to develop agendas with the intent to gain insight into personal life experiences and situations. Notes:  Ophelia Matson displayed positive engagement in the agenda psychotherapy group. He was able to identify a goal of remaining present in the here and now and was successful in doing so. Ophelia Matson shared personal life experiences with fellow group members and was able to create connections authentically and vulnerably. He stated he would like to make the best out of this week (with it being his last in the program). He stated he continues to struggle with feelings of anger and depression and nightmares. Status After Intervention:  Improved    Participation Level:  Active Listener and Interactive    Participation Quality: Appropriate, Attentive, Sharing and Supportive      Speech:  normal      Thought Process/Content: Logical  Linear      Affective Functioning: Congruent and Constricted/Restricted      Mood: depressed (decreased)      Level of consciousness:  Alert, Oriented x4 and Attentive      Response to Learning: Able to verbalize current knowledge/experience, Able to verbalize/acknowledge new learning, Able to retain information, Capable of insight and Progressing to goal      Endings: None Reported    Modes of Intervention: Support, Exploration, Clarifying and Problem-solving      Discipline Responsible: Registered and Board Certified Art Therapist      Signature:  Pricilla Muse MS, ATR-BC

## 2022-04-25 NOTE — GROUP NOTE
Group Therapy Note    Date: 4/25/2022    Group Start Time: 10:15 AM  Group End Time: 11:00 AM  Group Topic: Psychoeducation    [facility]    [address]        Group Therapy Note    Attendees: 8    Participants learned about Cognitive Distortions and provided examples of their own unhelpful thinking styles. Notes:  Nnamdi Roe attended group and was engaged in group discussion about cognitive distortions. He provided examples of his own distortions and we discussed as a group. Nnamdi Roe reports he is still struggling with anger and depression.      Status After Intervention:  Improved    Participation Level: Interactive    Participation Quality: Appropriate, Attentive, Sharing and Supportive      Speech:  normal      Thought Process/Content: Logical      Affective Functioning: Congruent      Mood: euthymic      Level of consciousness:  Alert, Oriented x4 and Attentive      Response to Learning: Able to verbalize/acknowledge new learning, Capable of insight and Progressing to goal      Endings: None Reported    Modes of Intervention: Education and Problem-solving      Discipline Responsible: /Counselor      Signature:  [address]

## 2022-04-26 RX ORDER — ASPIRIN 81 MG
TABLET,CHEWABLE ORAL
Qty: 30 TABLET | Refills: 3 | Status: SHIPPED | OUTPATIENT
Start: 2022-04-26

## 2022-04-26 NOTE — BH NOTE
Outpatient Treatment Team Progress Note  Date of Review: 4/26/2022        Multidisciplinary Outpatient Treatment Team met to discuss and review patient's progress in group and individual therapy sessions.         Presenting Problem: PTSD, Depression, Grief     Patient's Current Treatment Status: Latricia Hilliard has maintained consistency in increased engagement with group members, continues to demonstrate improved assertive communication. Per physician, Latricia Hilliard has started a new trail of medication for nightmares/terrors, will monitor for efficacy and side-effects. Through increased engagement, Latricia Hilliard does remain guarded in emotional expression, withholding affective and emotive communication with peers in group session.  Per treatment team, Latricia Hilliard may benefit from referral to individual therapy services following completion of IOP.     Treatment Start Date: 3/21/2022     Treatment Step-Down Date: N/A     Tentative Discharge Date: 4/29/2022    Taylor Calderon MM, MT-BC  Psychoeducation Specialist  Board-Certified Music Therapist

## 2022-04-28 ENCOUNTER — HOSPITAL ENCOUNTER (OUTPATIENT)
Dept: PSYCHIATRY | Age: 57
Setting detail: THERAPIES SERIES
Discharge: HOME | End: 2022-04-28
Payer: COMMERCIAL

## 2022-04-28 ENCOUNTER — APPOINTMENT (OUTPATIENT)
Dept: PSYCHIATRY | Age: 57
End: 2022-04-28
Payer: COMMERCIAL

## 2022-04-28 DIAGNOSIS — F33.2 MAJOR DEPRESSIVE DISORDER, RECURRENT SEVERE WITHOUT PSYCHOTIC FEATURES (HCC): ICD-10-CM

## 2022-04-28 PROCEDURE — 90853 GROUP PSYCHOTHERAPY: CPT

## 2022-04-28 SDOH — SOCIAL STABILITY: SOCIAL NETWORK
DO YOU BELONG TO ANY CLUBS OR ORGANIZATIONS SUCH AS CHURCH GROUPS UNIONS, FRATERNAL OR ATHLETIC GROUPS, OR SCHOOL GROUPS?: NO

## 2022-04-28 SDOH — HEALTH STABILITY: MENTAL HEALTH
STRESS IS WHEN SOMEONE FEELS TENSE, NERVOUS, ANXIOUS, OR CAN'T SLEEP AT NIGHT BECAUSE THEIR MIND IS TROUBLED. HOW STRESSED ARE YOU?: VERY MUCH

## 2022-04-28 NOTE — GROUP NOTE
Group Therapy Note    Date: 4/28/2022    Group Start Time: 11:15 AM  Group End Time: 12:15 PM  Group Topic: Music Therapy    [facility]    [address]        Group Therapy Note    Mode of Intervention: Simuel Gosselin    Group members explored tools used in self-care and expression, compiling a list collaboratively. Facilitator led discussion of uses of music in coping and self-expression, provided group members with opportunity to share songs from 3 categories: a song that elicits 1-excitement, 2-relaxation, 3-understanding. Group members then listened to songs at random and discussed emotional responses therein, offered reflection on individual significance. Attendees: 7         Notes:  Pt present and actively engaged in discussions and activities, collaborating with peers in discussion of music as coping and self-expression. Status After Intervention:  Improved    Participation Level:  Active Listener and Interactive    Participation Quality: Appropriate, Sharing and Supportive      Speech:  normal      Thought Process/Content: Logical      Affective Functioning: Congruent      Mood: euthymic      Level of consciousness:  Alert and Attentive      Response to Learning: Capable of insight and Progressing to goal      Endings: None Reported    Modes of Intervention: Socialization, Exploration, Activity and Media      Discipline Responsible: Psychoeducational Specialist      Signature:  Lesley Scott MM, MT-BC

## 2022-04-28 NOTE — BH NOTE
Writer/Therapist met with Latricia Hilliard to review his progress in IOP program and discuss his continued stressors and symptoms. Latricia Hilliard reported that he continues to experience significant nightmares on a nightly basis and has shared this with his Provider who has increased his medication as a result. Latricia Hilliard presented to this writer as nervous, exhibited difficulty with eye contact, fidgeting and low speech. He reported that he has made gains in areas including vocalizing thoughts in group and connecting with peers. He expressed that he continues to experience difficulty with trust-building, acceptance of grief and traumatic life experiences, and concentration due to high levels of anxiety. He reported these barriers continue to prohibit him from returning to his job, as his job is detail oriented and requires significant focus. Latricia Hilliard would benefit from continued attention and practice in these areas to increase his autonomy, ability to make decisions, return to work and have a higher level of functioning in the community.      Melissa Lewis, MS, ATR-BC

## 2022-04-28 NOTE — GROUP NOTE
Group Therapy Note    Date: 4/28/2022    Group Start Time:  8:30 AM  Group End Time:  9:45 AM  Group Topic: Psychotherapy    ZAYNABZ HAYLEE I    [address]        Group Therapy Note      Patient will set agenda for engagement in psychotherapy, use structure of group dynamics to explore behaviors in healthy communication and relationships. Attendees: 8         Patient's Goal:  Ирина Morales set agenda to be welcoming to new group members and find ways to connect. Notes:  Ирина Morales was an active participant in group psychotherapy session, reflective while recounting his backstory and trauma related to death of both father (homicide) and mother (natural causes). This provided avenue for multiple group members to explore role of acceptance in grief, uses of new relationships in developing trust following loss. Ирина Morales repeated his previous claims of having repeated nightmares and recurring memories of vivid traumatic experiences. Status After Intervention:  Improved    Participation Level:  Active Listener and Interactive    Participation Quality: Attentive, Sharing and Supportive      Speech:  normal      Thought Process/Content: Logical      Affective Functioning: Congruent      Mood: euthymic      Level of consciousness:  Alert and Attentive      Response to Learning: Capable of insight and Progressing to goal      Endings: None Reported    Modes of Intervention: Support, Exploration and Problem-solving      Discipline Responsible: Psychoeducational Specialist      Signature:  [address], [place], [place]

## 2022-04-29 ENCOUNTER — APPOINTMENT (OUTPATIENT)
Dept: PSYCHIATRY | Age: 57
End: 2022-04-29
Payer: COMMERCIAL

## 2022-04-29 ENCOUNTER — HOSPITAL ENCOUNTER (OUTPATIENT)
Dept: PSYCHIATRY | Age: 57
Setting detail: THERAPIES SERIES
Discharge: HOME | End: 2022-04-29
Payer: COMMERCIAL

## 2022-04-29 DIAGNOSIS — F33.2 MAJOR DEPRESSIVE DISORDER, RECURRENT SEVERE WITHOUT PSYCHOTIC FEATURES (HCC): ICD-10-CM

## 2022-04-29 PROCEDURE — 90853 GROUP PSYCHOTHERAPY: CPT

## 2022-04-29 PROCEDURE — 90853 GROUP PSYCHOTHERAPY: CPT | Performed by: SOCIAL WORKER

## 2022-04-29 NOTE — GROUP NOTE
Group Therapy Note    Date: 4/29/2022    Group Start Time:  8:30 AM  Group End Time:  9:45 AM  Group Topic: Psychotherapy    [facility]    Adammeliton Cristobal        Group Therapy Note    Psychotherapy: Patient will set goal and agenda for engagement in psychotherapy, use structure of group dynamics to explore behaviors in healthy communication and relationships. Attendees: 7         Patient's Goal:  Keyon set agenda to express \"serious or negative\" topics covered with positive framing. Notes:  Magali Boyle reflected on his experiences with symptoms of grief and anhedonia, relating to peers while discussing motivation \"as a muscle\". Magali Boyle challenged peers to make connections between thoughts and behaviors, citing \"I've made the decision that I can do what I want when I want to do it. \" This was processed as a mantra, group members agreeing to adopt this mentality and use mantra in times of low motivation/self-worth. Status After Intervention:  Improved    Participation Level:  Active Listener and Interactive    Participation Quality: Appropriate, Sharing and Supportive      Speech:  normal      Thought Process/Content: Logical      Affective Functioning: Congruent      Mood: euthymic      Level of consciousness:  Alert and Oriented x4      Response to Learning: Able to verbalize current knowledge/experience, Able to verbalize/acknowledge new learning and Progressing to goal      Endings: None Reported    Modes of Intervention: Support, Socialization, Exploration, Clarifying and Problem-solving      Discipline Responsible: Psychoeducational Specialist      Signature:  Luca Rivera MM, MT-BC

## 2022-04-29 NOTE — GROUP NOTE
Group Therapy Note    Date: 4/29/2022    Group Start Time: 10:15 AM  Group End Time: 11:00 AM  Group Topic: Psychoeducation    [facility]    [address]        Group Therapy Note    Attendees: 7    Participants learned Motivation Is A Muscle: The 7 Best Ways To Substantially Increase Your Productivity. Notes:  Carlton Lane attended group and was engaged in learning about motivation and goal setting. Carlton Lane said one of his goals would be to start eating more healthy foods so he will feel better and improve his bloodwork results.      Status After Intervention:  Improved    Participation Level: Interactive    Participation Quality: Appropriate, Attentive and Sharing      Speech:  normal      Thought Process/Content: Logical      Affective Functioning: Congruent      Mood: euthymic      Level of consciousness:  Alert, Oriented x4 and Attentive      Response to Learning: Able to verbalize/acknowledge new learning and Progressing to goal      Endings: None Reported    Modes of Intervention: Education and Activity      Discipline Responsible: /Counselor      Signature:  [address]

## 2022-04-29 NOTE — GROUP NOTE
Group Therapy Note    Date: 4/29/2022    Group Start Time: 11:15 AM  Group End Time: 12:25 PM  Group Topic: Art Therapy     [facility]      Group Therapy Note    Attendees: 7    Group members engaged in an art therapy directive focusing on development of identity and identification of personal rights. Patients were instructed to create drawings completing the statement/prompt \"I have the right to. Shyanne Marisa Shyanne Marisa \" Patients were encouraged to process their artwork with fellow group members and facilitators. Group members were offered a Bill of Personal Rights handout at the end of group to compliment the group discussion reflecting on artwork, autonomy and self advocacy. Notes:  Chas Faye displayed positive ability to engage in both the art therapy intervention and group discussion. He was hesitant to share his artwork initially but with encouraging from group members he was able to be forthcoming as well as practice actively listen with other group members. Chas Faye offered insightful feedback to others life experiences and was able to connect his life experience with theirs on multiple occassions. Status After Intervention:  Improved    Participation Level:  Active Listener and Interactive    Participation Quality: Appropriate, Attentive, Sharing and Supportive      Speech:  hesitant      Thought Process/Content: Logical  Linear      Affective Functioning: Blunted      Mood: depressed      Level of consciousness:  Alert, Oriented x4 and Attentive      Response to Learning: Able to verbalize current knowledge/experience, Able to verbalize/acknowledge new learning, Able to retain information and Progressing to goal      Endings: None Reported    Modes of Intervention: Support, Exploration, Clarifying, Problem-solving and Activity      Signature:  Gayathri Palma, MS, ATR-BC

## 2022-04-29 NOTE — GROUP NOTE
Group Therapy Note    Date: 4/28/2022    Group Start Time: 10:00 AM  Group End Time: 11:00 AM  Group Topic: Psychoeducation    [facility]    JENNIFFER Hernández        Group Therapy Note  The clinician introduced the group to the cycle of grief and the members wrote the things they are grieving in the [place]. They processed the different stages of grief and how they go in and out of the stages. Attendees: 7         Patient's Goal:      Notes:  Patient was fully engaged in the group discussion and activity. He participated in the cycle of grief worksheet and he shared how he has been stuck in several stages for years. The group processed that there is no time limit on grief. Status After Intervention:  Improved    Participation Level:  Active Listener, Interactive and None    Participation Quality: Appropriate      Speech:  normal      Thought Process/Content: Linear      Affective Functioning: Congruent      Mood: depressed      Level of consciousness:  Attentive      Response to Learning: Able to verbalize current knowledge/experience      Endings: None Reported    Modes of Intervention: Education, Exploration and Activity      Discipline Responsible: /Counselor      Signature:  JENNIFFER Hernández

## 2022-05-02 ENCOUNTER — HOSPITAL ENCOUNTER (OUTPATIENT)
Dept: PSYCHIATRY | Age: 57
Setting detail: THERAPIES SERIES
Discharge: HOME | End: 2022-05-02
Payer: COMMERCIAL

## 2022-05-02 DIAGNOSIS — F43.23 ADJUSTMENT DISORDER WITH MIXED ANXIETY AND DEPRESSED MOOD: ICD-10-CM

## 2022-05-02 PROCEDURE — 90853 GROUP PSYCHOTHERAPY: CPT

## 2022-05-02 NOTE — GROUP NOTE
Group Therapy Note    Date: 5/2/2022    Group Start Time:  8:30 AM  Group End Time:  9:45 AM  Group Topic: Psychotherapy    [facility]      Group Therapy Note    Attendees: 8    Group members engaged in an [place] Psychotherapy group. Patients were encouraged to identify individual goal/agendas for the group and were supported in developing their agendas through the use of peer interactions, feedback and discussion. Notes:  Carlton Lane displayed positive ability to engage with peers in group. He was able to relate to shared life experiences and displayed greater emotion than in previous groups. Status After Intervention:  Improved    Participation Level:  Active Listener and Interactive    Participation Quality: Appropriate, Attentive, Sharing and Supportive      Speech:  hesitant      Thought Process/Content: Logical  Linear      Affective Functioning: Constricted/Restricted      Mood: anxious and depressed      Level of consciousness:  Alert, Oriented x4 and Attentive      Response to Learning: Able to verbalize current knowledge/experience, Able to verbalize/acknowledge new learning, Able to retain information, Capable of insight and Progressing to goal      Endings: None Reported    Modes of Intervention: Support, Exploration, Clarifying and Problem-solving      Signature:  Kenny Trujillo MS, ATR-BC

## 2022-05-02 NOTE — GROUP NOTE
Group Therapy Note    Date: 5/2/2022    Group Start Time: 10:00 AM  Group End Time: 11:00 AM  Group Topic: Education Group - Outpatient    KITA LEACH I    [address] [place] [address]        Group Therapy Note    Attendees: 9         Patient's Goal:  To learn and discuss the importance of having a support system. Pt's asked to identify their support system and also gave pt's resources for support systems in the community. Notes:  Pt actively participated in group discussion and was able to apply to himself. Pt was able to identify his support system. Status After Intervention:  Improved    Participation Level:  Active Listener and Interactive    Participation Quality: Appropriate, Attentive, Sharing and Supportive      Speech:  normal      Thought Process/Content: Logical      Affective Functioning: Congruent      Mood: euthymic      Level of consciousness:  Alert, Oriented x4 and Attentive      Response to Learning: Able to verbalize current knowledge/experience, Able to verbalize/acknowledge new learning and Progressing to goal      Endings: None Reported    Modes of Intervention: Education, Support, Socialization, Exploration and Clarifying      Discipline Responsible: /Counselor      Signature:  [facility] [address]

## 2022-05-02 NOTE — GROUP NOTE
Group Therapy Note    Date: 5/2/2022    Group Start Time: 1115  Group End Time: 4457  Group Topic: Psychoeducation    [facility]        Group Therapy Note    Topic: Healthy Distraction techniques    Group members engaged in experiential activities to develop and practice coping skills based in healthy distraction. Group members collaborated in multiple rounds of category games, list-making, multi-sensory processing, and bilateral drawing/mirrored movement. Attendees: 8         Patient's Goal:  Pt will engage in collaborative experience practicing coping skills and healthy distraction through cognitive activities. Notes:  Pt was an active participant in group therapy session and experiences therein. Pt was pleasant and cooperative across session with peers. Status After Intervention:  Improved    Participation Level:  Active Listener and Interactive    Participation Quality: Appropriate, Attentive and Sharing      Speech:  normal      Thought Process/Content: Logical      Affective Functioning: Congruent      Mood: euthymic      Level of consciousness:  Alert and Attentive      Response to Learning: Capable of insight and Progressing to goal      Endings: None Reported    Modes of Intervention: Socialization, Exploration, Activity and Media      Discipline Responsible: Psychoeducational Specialist      Signature:  Reggie Delatorre MM, MT-BC

## 2022-05-03 NOTE — CARE COORDINATION
Outpatient Treatment Team Progress Note  Date of Review: 05/03//2022        Multidisciplinary Outpatient Treatment Team met to discuss and review patient's progress in group and individual therapy sessions.         Presenting Problem: PTSD, Depression, Grief     Patient's Current Treatment Status: Keyon continues to demonstrate improved assertive communication with peers, actively pursuing opportunities to provide feedback. Per physician, Jennifer Rosado has started a new trail of medication for nightmares/terrors, continue monitoring for efficacy.  Per treatment team, Jennifer Rosado may benefit from referral to individual therapy services following completion of IOP.     Treatment Start Date: 3/21/2022     Treatment Step-Down Date: N/A     Tentative Discharge Date: 5/20/2022     Lor Tubbs MM, MT-BC  Psychoeducation Specialist  Board-Certified Music Therapist

## 2022-05-05 ENCOUNTER — HOSPITAL ENCOUNTER (OUTPATIENT)
Dept: PSYCHIATRY | Age: 57
Setting detail: THERAPIES SERIES
Discharge: HOME | End: 2022-05-05
Payer: COMMERCIAL

## 2022-05-05 DIAGNOSIS — F33.2 MAJOR DEPRESSIVE DISORDER, RECURRENT SEVERE WITHOUT PSYCHOTIC FEATURES (HCC): ICD-10-CM

## 2022-05-05 PROCEDURE — 90853 GROUP PSYCHOTHERAPY: CPT | Performed by: SOCIAL WORKER

## 2022-05-05 PROCEDURE — 90853 GROUP PSYCHOTHERAPY: CPT

## 2022-05-05 NOTE — GROUP NOTE
Group Therapy Note    Date: 5/5/2022    Group Start Time:  8:30 AM  Group End Time:  9:45 AM  Group Topic: Psychotherapy    [facility]    [address]        Group Therapy Note    Attendees: 7           Notes:  Magali Bolye expressed desire to be \"happy that I'm even here. \" During session Magali Boyle related to peers while discussing his decreased motivation to engage in self-care, reporting a constant desire to go home and relax.     Status After Intervention:  Improved    Participation Level: Interactive    Participation Quality: Lethargic      Speech:  normal      Thought Process/Content: Linear      Affective Functioning: Congruent      Mood: depressed      Level of consciousness:  Attentive      Response to Learning: Capable of insight and Progressing to goal      Endings: None Reported    Modes of Intervention: Support, Socialization, Exploration, Clarifying and Problem-solving      Discipline Responsible: Psychoeducational Specialist      Signature:  [address], [place], [place]-BC

## 2022-05-05 NOTE — GROUP NOTE
Group Therapy Note    Date: 5/5/2022    Group Start Time: 11:15 AM  Group End Time: 12:00 PM  Group Topic: Psychoeducation    [facility]    [address]        Group Therapy Note    Attendees: 7    Participants learned about wellness recovery and completed some worksheets in the Wellness Recovery Action Plan Workbook. Notes:  Lauren Wood attended group and was engaged in learning about a wellness recovery plan. Lauren Wood wrote down his current coping skills and ones he would like to try. Lauren Wood also worked on recognizing triggers and how to notice when he is not doing well.      Status After Intervention:  Improved    Participation Level: Interactive    Participation Quality: Appropriate, Attentive and Sharing      Speech:  normal      Thought Process/Content: Logical      Affective Functioning: Congruent      Mood: euthymic      Level of consciousness:  Alert, Oriented x4 and Attentive      Response to Learning: Able to verbalize/acknowledge new learning and Progressing to goal      Endings: None Reported    Modes of Intervention: Education and Activity      Discipline Responsible: /Counselor      Signature:  [address]

## 2022-05-05 NOTE — GROUP NOTE
Group Therapy Note    Date: 5/5/2022    Group Start Time: 10:00 AM  Group End Time: 11:00 AM  Group Topic: Psychoeducation    [facility]    JENNIFFER Tsang        Group Therapy Note  Clinician introduced recognizing toxic people and setting healthy boundaries. Once the group worked through the activity of setting healthy boundaries, they worked through how to respect other people's boundaries when they set them against them. They came to the conclusion as a group, that the way they set boundaries and accept other people's boundaries is with RESPECT. Attendees: 7         Patient's Goal:      Notes:  Patient was cooperative and fully engaged in the group discussion and activity. He gave an example of setting healthy boundaries at work to the group. They responded positively and he was able to give good feedback to others who were working on setting boundaries with loved ones. Status After Intervention:  Improved    Participation Level:  Active Listener    Participation Quality: Appropriate, Attentive, Sharing and Supportive      Speech:  normal      Thought Process/Content: Logical      Affective Functioning: Congruent      Mood: euthymic      Level of consciousness:  Oriented x4      Response to Learning: Able to verbalize/acknowledge new learning      Endings: None Reported    Modes of Intervention: Education, Exploration, Clarifying and Activity      Discipline Responsible: /Counselor      Signature:  JENNIFFER Tsang

## 2022-05-06 ENCOUNTER — HOSPITAL ENCOUNTER (OUTPATIENT)
Dept: PSYCHIATRY | Age: 57
Setting detail: THERAPIES SERIES
Discharge: HOME | End: 2022-05-06
Payer: COMMERCIAL

## 2022-05-06 ENCOUNTER — TELEPHONE (OUTPATIENT)
Dept: FAMILY MEDICINE CLINIC | Age: 57
End: 2022-05-06

## 2022-05-06 DIAGNOSIS — F43.10 PTSD (POST-TRAUMATIC STRESS DISORDER): ICD-10-CM

## 2022-05-06 PROCEDURE — 90853 GROUP PSYCHOTHERAPY: CPT | Performed by: SOCIAL WORKER

## 2022-05-06 PROCEDURE — 90853 GROUP PSYCHOTHERAPY: CPT

## 2022-05-06 NOTE — PROGRESS NOTES
Magali Boyle is to increase Zoloft to 200 mg daily and Prazosin to 3 mg HS   Reviewed Gabriel Ortega note 5//2022

## 2022-05-06 NOTE — BH NOTE
Pt c/o depression not improving and nightmares/sleeplessness continuing. Writer spoke with Dr. Attila Walter who approved an increase of Zoloft to 200 mg daily and Prazosin to 3 mg HS. Pt notified of change.

## 2022-05-06 NOTE — GROUP NOTE
Group Therapy Note    Date: 5/6/2022    Group Start Time:  8:00 AM  Group End Time:  9:45 AM  Group Topic: Psychotherapy    [facility]    JENNIFFER Nolen        Group Therapy Note  Group members engaged in psychotherapy agenda group; identifying a personal agenda/goal and using interpersonal interactions among group members to develop goals and create connections among group members. Attendees: 7         Patient's Goal:  Patient stated he just wanted to sit back and be chill today. He wants to work things out on his own. \"    Notes:  Patient was quiet and listening to all of the group conversations and at the end of the group he weighed in on the conversations. He stated life is about having a lot of triggers. Love is one of God's greatest commandments. He warned of being aware of pushing other people's triggers. He clearly stated, you are the only one who knows what it is like in your home and you must stay safe. People all have buttons and if you push one too hard or too often, the person can be set off. He was warning of other group members to be cautious and to stay safe when setting boundaries with people who are toxic. Status After Intervention:  Improved    Participation Level:  Active Listener    Participation Quality: Supportive      Speech:  hesitant      Thought Process/Content: Logical      Affective Functioning: Constricted/Restricted      Mood: depressed      Level of consciousness:  Attentive      Response to Learning: Able to verbalize/acknowledge new learning      Endings: None Reported    Modes of Intervention: Support, Exploration and Clarifying      Discipline Responsible: /Counselor      Signature:  JENNIFFER Nolen

## 2022-05-06 NOTE — GROUP NOTE
Group Therapy Note    Date: 5/6/2022    Group Start Time: 10:00 AM  Group End Time: 11:00 AM  Group Topic: Psychoeducation    [facility]    JENNIFFER Woods        Group Therapy Note  The clinician had the group members define the word \"mindset\" with their personal definition. The clinician then had a group member look up the Elaine definition that worded (thoughts, feelings and behaviors). The group was able to see the CBT triangle in the definition. The clinician then introduce the practice of conative reconstruction. Each group member wrote a negative thought on the whiteboard, then reframed it to a positive statement. They were able to name the negative feeling they felt when they made the negative thought. They were also able to name a positive feeling when they changed the negative thought into a positive statement. The group members continued practicing changing the negative mindset to a positive one. Attendees: 7         Patient's Goal:      Notes: The group member was cooperative and fully engaged in the discussion and activity. He was able to reframe negative thoughts into positive statements. Status After Intervention:  Improved    Participation Level:  Active Listener    Participation Quality: Sharing      Speech:  normal      Thought Process/Content: Logical      Affective Functioning: Congruent      Mood: depressed      Level of consciousness:  Preoccupied      Response to Learning: Able to retain information      Endings: None Reported    Modes of Intervention: Education and Activity      Discipline Responsible: /Counselor      Signature:  JENNIFFER Woods

## 2022-05-09 ENCOUNTER — HOSPITAL ENCOUNTER (OUTPATIENT)
Dept: PSYCHIATRY | Age: 57
Setting detail: THERAPIES SERIES
Discharge: HOME | End: 2022-05-09
Payer: COMMERCIAL

## 2022-05-09 DIAGNOSIS — F33.2 MAJOR DEPRESSIVE DISORDER, RECURRENT SEVERE WITHOUT PSYCHOTIC FEATURES (HCC): ICD-10-CM

## 2022-05-09 PROCEDURE — 90853 GROUP PSYCHOTHERAPY: CPT

## 2022-05-09 PROCEDURE — 90853 GROUP PSYCHOTHERAPY: CPT | Performed by: SOCIAL WORKER

## 2022-05-09 NOTE — GROUP NOTE
Group Therapy Note    Date: 5/9/2022    Group Start Time: 11:15 AM  Group End Time: 12:15 PM  Group Topic: Psychoeducation    ZAYNABZ HAYLEE Cristobal        Group Therapy Note    Topic: Values Clarification    Group members engaged in reflective discussion, defining values and discussing their role in everyday life. During session members engaged in reflective survey, selecting 10 values of highest importance, then narrowing down to 6, then 3, then 1 value of utmost importance. Group concluded with members setting goal to process question \"How can I allow my values to influence my life? \"    Attendees: 9         Patient's Goal:  Identify 10 values of utmost importance to self, then narrow down to 1 value, setting goal to explore influence of this value on behavior and relationships. Notes: This member was actively engaged across group interventions and discussions, reflective while processing personal values with peers. Set intention to explore values' impact on behavior. Status After Intervention:  Improved    Participation Level:  Active Listener and Interactive    Participation Quality: Appropriate      Speech:  normal      Thought Process/Content: Logical      Affective Functioning: Congruent      Mood: euthymic      Level of consciousness:  Alert and Attentive      Response to Learning: Able to verbalize current knowledge/experience, Able to verbalize/acknowledge new learning and Progressing to goal      Endings: None Reported    Modes of Intervention: Education, Support, Exploration and Problem-solving      Discipline Responsible: Psychoeducational Specialist      Signature:  Martine Gramajo MM, MT-BC

## 2022-05-09 NOTE — GROUP NOTE
Group Therapy Note    Date: 5/9/2022    Group Start Time: 10:15 AM  Group End Time: 11:00 AM  Group Topic: Psychoeducation    [facility]    [address]ARE[address] Millard        Group Therapy Note    Attendees: 9    Participants took a self-care assessment, discussed results and if they were surprising or not then learned about filling the six different self-care areas (Physical, Psychological, Emotional, Spiritual, Personal and Professional.       Notes:  Parul Cortez attended group and was engaged in learning about self care and he provided feedback about his own self care and areas he would like to improve upon.      Status After Intervention:  Improved    Participation Level: Interactive    Participation Quality: Appropriate, Attentive and Sharing      Speech:  normal      Thought Process/Content: Logical      Affective Functioning: Congruent      Mood: euthymic      Level of consciousness:  Alert, Oriented x4 and Attentive      Response to Learning: Able to verbalize/acknowledge new learning, Capable of insight and Progressing to goal      Endings: None Reported    Modes of Intervention: Education and Activity      Discipline Responsible: /Counselor      Signature:  [address]

## 2022-05-09 NOTE — GROUP NOTE
Group Therapy Note    Date: 5/9/2022    Group Start Time:  8:30 AM  Group End Time:  9:45 AM  Group Topic: Psychotherapy    [facility]        Group Therapy Note    Attendees: 9    Group members engaged in psychotherapy agenda setting group. Patients identified individual agenda goals on which to focus for the duration of group. Group members were then given an opportunity to use dialogue, interpersonal communication skills and accountability to further develop individual goals as a group. Notes:  Jennifer Rosado seemed invested and engaged in the agenda psychotherapy group. He was able to identify a personal goal, as well as relate to and communicate thoughts and encouragement to group peers. He continues to struggle with acceptance of past life situations, grief and shame and guilt. Status After Intervention:  Improved    Participation Level:  Active Listener and Interactive    Participation Quality: Appropriate, Attentive, Sharing and Supportive      Speech:  hesitant      Thought Process/Content: Logical  Perseverating      Affective Functioning: Exaggerated      Mood: depressed and dysphoric      Level of consciousness:  Alert, Oriented x4 and Attentive      Response to Learning: Able to verbalize current knowledge/experience, Able to verbalize/acknowledge new learning, Able to retain information and Progressing to goal      Endings: None Reported    Modes of Intervention: Support, Exploration, Clarifying and Problem-solving        Signature:  Dean Resendez, MS, ATR-BC

## 2022-05-10 ENCOUNTER — OFFICE VISIT (OUTPATIENT)
Dept: FAMILY MEDICINE CLINIC | Age: 57
End: 2022-05-10
Payer: COMMERCIAL

## 2022-05-10 VITALS
DIASTOLIC BLOOD PRESSURE: 92 MMHG | SYSTOLIC BLOOD PRESSURE: 144 MMHG | OXYGEN SATURATION: 96 % | HEIGHT: 69 IN | HEART RATE: 95 BPM | BODY MASS INDEX: 45.32 KG/M2 | WEIGHT: 306 LBS

## 2022-05-10 DIAGNOSIS — I10 HYPERTENSION, ESSENTIAL: ICD-10-CM

## 2022-05-10 DIAGNOSIS — E78.2 MIXED HYPERLIPIDEMIA: ICD-10-CM

## 2022-05-10 DIAGNOSIS — F33.2 MAJOR DEPRESSIVE DISORDER, RECURRENT SEVERE WITHOUT PSYCHOTIC FEATURES (HCC): Primary | ICD-10-CM

## 2022-05-10 DIAGNOSIS — F43.10 PTSD (POST-TRAUMATIC STRESS DISORDER): ICD-10-CM

## 2022-05-10 PROCEDURE — 99214 OFFICE O/P EST MOD 30 MIN: CPT | Performed by: FAMILY MEDICINE

## 2022-05-10 RX ORDER — OLMESARTAN MEDOXOMIL AND HYDROCHLOROTHIAZIDE 40/12.5 40; 12.5 MG/1; MG/1
1 TABLET ORAL DAILY
Qty: 90 TABLET | Refills: 1 | Status: SHIPPED | OUTPATIENT
Start: 2022-05-10

## 2022-05-10 NOTE — CARE COORDINATION
Outpatient Treatment Team Progress Note  Date of Review: 05/10/2022        Multidisciplinary Outpatient Treatment Team met to discuss and review patient's progress in group and individual therapy sessions.         Presenting Problem: PTSD, Depression, Grief     Patient's Current Treatment Status: Keyon continues to demonstrate improved assertive communication with peers, actively pursuing opportunities to provide feedback. Milo Carlisle continues to build coping skills related to struggles with acceptance of past life situations, grief and shame.  Per treatment team, Milo Carlisle may benefit from referral to individual therapy services following completion of IOP.     Treatment Start Date: 3/21/2022     Treatment Step-Down Date: N/A     Tentative Discharge Date: 5/20/2022     Reggie Delatorre MM, MT-BC  Psychoeducation Specialist  Board-Certified Music Therapist

## 2022-05-10 NOTE — PROGRESS NOTES
Yari Keller is a 55 y.o. male. HPI:  Here for FMLA form completion  Struggling with severe depression and PTSD, on medication and in counseling grp   Left his meds at home, psychiatrist is adjusting    Recommend he MyChart me with his updated med list so I know what meds he is on    Some better, making eye contact  Hoping to go back to work the week of May 23? Last counseling group meeting is May 20    Meds, vitamins and allergies reviewed with pt    Weight gain noted    Wt Readings from Last 3 Encounters:   05/10/22 (!) 306 lb (138.8 kg)   04/11/22 295 lb (133.8 kg)   04/11/22 297 lb 9.6 oz (135 kg)       REVIEW OF SYSTEMS:   CONSTITUTIONAL: See history of present illness,   Weight noted   HEENT: No new vision difficulties or ringing in the ears. RESPIRATORY: No new SOB, PND, orthopnea or cough. CARDIOVASCULAR: no CP, palpitations or SOB with exertion  GI: No nausea, vomiting, diarrhea, constipation, abdominal pain or changes in bowel habits. : No urinary frequency, urgency, incontinence hematuria or dysuria. SKIN: No cyanosis or skin lesions. MUSCULOSKELETAL: No new muscle or joint pain. NEUROLOGICAL: No syncope or TIA-like symptoms. PSYCHIATRIC: still struggling, tearful today  Allergies   Allergen Reactions    Aleve [Naproxen Sodium] Rash     \"pt takes aspirin at home\"    Norvasc [Amlodipine Besylate] Other (See Comments)     Palpitations/chest pressure       Prior to Visit Medications    Medication Sig Taking?  Authorizing Provider   olmesartan-hydroCHLOROthiazide (BENICAR HCT) 40-12.5 MG per tablet Take 1 tablet by mouth daily Yes Lara Meeks MD   ASPIRIN LOW DOSE 81 MG chewable tablet CHEW ONE TABLET BY MOUTH DAILY Yes Lara Meeks MD   prazosin (MINIPRESS) 1 MG capsule Take 1 capsule by mouth nightly  Patient taking differently: Take 2 mg by mouth nightly  Yes Eric Morales MD   sertraline (ZOLOFT) 100 MG tablet Take 1.5 tablets by mouth daily Yes Eric Morales MD hydrALAZINE (APRESOLINE) 10 MG tablet Take 1 tablet by mouth every 12 hours as needed (elevated BP) Yes Charles Devine MD   spironolactone (ALDACTONE) 25 MG tablet Take 1 tablet by mouth daily Yes Leatha Penny PA-C   mirtazapine (REMERON) 15 MG tablet Take 1 tablet by mouth nightly Yes Marleni Westfall MD   sertraline (ZOLOFT) 100 MG tablet Take 1 tablet by mouth daily Yes Marleni Westfall MD   pantoprazole (PROTONIX) 40 MG tablet TAKE ONE TABLET BY MOUTH EVERY MORNING BEFORE BREAKFAST Yes Charles Devine MD   atorvastatin (LIPITOR) 10 MG tablet TAKE ONE TABLET BY MOUTH ONCE NIGHTLY Yes Charles Devine MD   allopurinol (ZYLOPRIM) 100 MG tablet Take 2 tablets by mouth daily Yes Charles Devine MD   mometasone (ASMANEX, 30 METERED DOSES,) 110 MCG/INH AEPB Inhale 2 puffs into the lungs 2 times daily Yes Charles Devine MD   loratadine (CLARITIN) 10 MG tablet Take 1 tablet by mouth daily Yes Charles Devine MD   acetaminophen (TYLENOL) 325 MG tablet Take 650 mg by mouth every 6 hours as needed for Pain (last dose 8am) Yes Historical Provider, MD       Past Medical History:   Diagnosis Date    Arthritis     Bilateral renal cysts 6/3/2013    US   6/3/13     DJD (degenerative joint disease) 8/23/2011    Elevated uric acid in blood 5/22/2013    GERD (gastroesophageal reflux disease)     Gout 2013    HTN (hypertension) 5/22/2013    Hypertension     Movement disorder     gout; left knee along w/generallized arthritis    Prostate cancer ([facility] [address] 2/24/16    Renal insufficiency 6/1/2013    TIA (transient ischemic attack)        Social History     Tobacco Use    Smoking status: Never Smoker    Smokeless tobacco: Never Used   Substance Use Topics    Alcohol use: No       Family History   Problem Relation Age of Onset    Lupus Mother     Stroke Mother     Other Mother         COPD       OBJECTIVE:  BP (!) 144/92   Pulse 95   Ht 5' 9\" (1.753 m)   Wt (!) 306 lb (138.8 kg)   SpO2 96%   BMI 45.19 kg/m²   GEN: Tearful today  HEENT:  NCAT  NECK:  Supple without adenopathy. CV:  Regular rate and rhythm, S1 and S2 normal, no murmurs, clicks  PULM:  Chest is clear, no wheezing ,  symmetric air entry throughout both lung fields. ABD: Soft, NT, no masses appreciated, BMI no  EXT: No rash or edema  NEURO: Alert oriented ×3, nonfocal, no assistive device      GAD7= 17    PHQ9=22    ASSESSMENT/PLAN:  1. Major depressive disorder, recurrent severe without psychotic features ([facility] [address]  Continue medication and counseling  Get out and walk and try to exercise  Do things that make him happy  Asked him to MyChart me with list of updated medication    2. Hypertension, essential  Switch to Benicar HCT 40/12.5 in place of losartan  Recheck blood pressure 1 month    3. Mixed hyperlipidemia  Stable continue Lipitor 10 mg nightly    4.  PTSD (post-traumatic stress disorder)  In counseling, continue medication and counseling    Follow-up 1 month or as needed  he will see psychiatrist May 20, 2022 prior to going back to work    Form will be completed once I have his updated med list    30 Total Minutes spent pre charting (reviewing problem list, meds, any test results, consultant and hospital notes ) and  obtaining present visit history, performing appropriate medical exam/evaluation, counseling and educating the patient (and family), ordering medications ,tests, and procedures as needed, refilling medication(s), placing referral(s) when needed in addition to coordinating care for this patient and documenting in electronic health record and  FMLA form completion

## 2022-05-12 ENCOUNTER — HOSPITAL ENCOUNTER (OUTPATIENT)
Dept: PSYCHIATRY | Age: 57
Setting detail: THERAPIES SERIES
Discharge: HOME | End: 2022-05-12
Payer: COMMERCIAL

## 2022-05-12 DIAGNOSIS — F33.2 MAJOR DEPRESSIVE DISORDER, RECURRENT SEVERE WITHOUT PSYCHOTIC FEATURES (HCC): ICD-10-CM

## 2022-05-12 PROCEDURE — 90853 GROUP PSYCHOTHERAPY: CPT

## 2022-05-12 PROCEDURE — 90853 GROUP PSYCHOTHERAPY: CPT | Performed by: SOCIAL WORKER

## 2022-05-12 NOTE — GROUP NOTE
Group Therapy Note    Date: 5/12/2022    Group Start Time: 10:15 AM  Group End Time: 11:15 AM  Group Topic: Psychoeducation    [facility]    [address]        Group Therapy Note    Attendees: 8    Participants learned about \"Things we cannot control or change in life. \" Participants then processed the different areas in their lives that they have been trying to control and how they are wasting their energy on these things that cannot be changed or controlled. Notes:  Shila Zuluaga attended group and was engaged in learning about how he cannot control or change things in his life. Shila Zuluaga processed the difficulty in accepting things and letting go as well as the \"What if I would have. Gerardo Magic Gerardo Magic \" thinking. Shila Zuluaga made a good analogy that if you go work out with a  but go home and eat junk, when you go back to the , you may feel worse and weigh more because you did not put in the work. This clinician explained that this is the same way therapy works; we provide the tools and the therapy group members have to use those tools and work on themselves.     Status After Intervention:  Improved    Participation Level: Interactive    Participation Quality: Appropriate, Attentive, Sharing and Supportive      Speech:  normal      Thought Process/Content: Logical      Affective Functioning: Congruent      Mood: euthymic      Level of consciousness:  Alert, Oriented x4 and Attentive      Response to Learning: Able to verbalize/acknowledge new learning, Capable of insight and Progressing to goal      Endings: None Reported    Modes of Intervention: Education and Support      Discipline Responsible: /Counselor      Signature:  [address]

## 2022-05-12 NOTE — GROUP NOTE
Group Therapy Note    Date: 5/12/2022    Group Start Time: 11:15 AM  Group End Time: 12:15 PM  Group Topic: Music Therapy    [facility]        Group Therapy Note    Music therapy group consisted of alejandra analysis intervention, art, and verbal processing. JING presented music stimuli and facilitated discussion of communication in times of need, + coping with unmet expectations of others. Pts discussed things they would like to tell their support system, barriers to communication, and ways they can increase purposeful communication. Materials included alejandra sheet and  illustration of walls people put up. Attendees: 8         Notes:  During this group, Rosanna Hum reflected with peer on behaviors associated with minimizing needs and invalidating progress in mental health and wellness for the sake of others. Rosanna Charles reflected on his thought process and need for time to process questions and problems before addressing answers or finding solutions. Rosanna Charles with brightened affect and increased humorous interactions. Status After Intervention:  Improved    Participation Level:  Active Listener and Interactive    Participation Quality: Attentive, Sharing and Supportive      Speech:  normal      Thought Process/Content: Logical      Affective Functioning: Congruent      Mood: euthymic      Level of consciousness:  Alert and Oriented x4      Response to Learning: Capable of insight and Progressing to goal      Endings: None Reported    Modes of Intervention: Support, Socialization, Exploration, Problem-solving, Activity and Media      Discipline Responsible: Psychoeducational Specialist      Signature:  Roberth Keyes MM, JING

## 2022-05-12 NOTE — GROUP NOTE
Group Therapy Note    Date: 5/12/2022    Group Start Time:  8:30 AM  Group End Time:  9:45 AM  Group Topic: Psychotherapy    [facility]    [place] [facility]        Group Therapy Note    Attendees: 8         Patient's Goal:  Patient will set goal and agenda for engagement in psychotherapy, use structure of group dynamics to explore behaviors in healthy communication and relationships. Notes:  Bhavik Ricci described mood as \"slightly better\", set agenda to be positive and use humor in coping to continue showing \"my full self\". During session Bhavik Ricci provided feedback to one peer regarding her experiences with PTSD and planning for events that may trigger symptoms of traumatic stress. Bhavik Ricci used his hx in [employer] to describe \"learning tricks to prepare\", elaborating on the need for training and preparation to handle difficult situations before \"just being thrown into something you can't handle. \" Bhavik Ricci described IOP as a program designed to train and instill preparation. Status After Intervention:  Improved    Participation Level:  Active Listener and Interactive    Participation Quality: Appropriate, Sharing and Supportive      Speech:  normal      Thought Process/Content: Logical      Affective Functioning: Congruent      Mood: euthymic      Level of consciousness:  Alert and Attentive      Response to Learning: Able to verbalize current knowledge/experience, Able to verbalize/acknowledge new learning and Progressing to goal      Endings: None Reported    Modes of Intervention: Support, Socialization, Exploration, Clarifying and Problem-solving      Discipline Responsible: Psychoeducational Specialist      Signature:  Ben Helm, MM, MT-BC

## 2022-05-13 ENCOUNTER — HOSPITAL ENCOUNTER (OUTPATIENT)
Dept: PSYCHIATRY | Age: 57
Setting detail: THERAPIES SERIES
Discharge: HOME | End: 2022-05-13
Payer: COMMERCIAL

## 2022-05-13 DIAGNOSIS — F33.2 MAJOR DEPRESSIVE DISORDER, RECURRENT SEVERE WITHOUT PSYCHOTIC FEATURES (HCC): ICD-10-CM

## 2022-05-13 PROCEDURE — 90853 GROUP PSYCHOTHERAPY: CPT | Performed by: SOCIAL WORKER

## 2022-05-13 PROCEDURE — 90853 GROUP PSYCHOTHERAPY: CPT

## 2022-05-13 NOTE — GROUP NOTE
Group Therapy Note    Date: 5/13/2022    Group Start Time:  8:30 AM  Group End Time:  9:45 AM  Group Topic: Psychotherapy    [facility]    [address]        Group Therapy Note    Attendees: 9         Patient's Goal:  Patient will set goal and agenda for engagement in psychotherapy, use structure of group dynamics to explore behaviors in healthy communication and relationships. Notes:  During session, Bhavik Ricci described his mood as good, reporting an intention to stay positive and relaxed. Bhavik Ricci was pleasant and engaging with peers, validating of others' positive engagement and self-reflection. Bhavik Ricci acknowledged that he may not relate to all aspects of pt's backgrounds, but that he has developed skills while making transfers to his lived experience. Status After Intervention:  Improved    Participation Level:  Active Listener and Interactive    Participation Quality: Appropriate and Supportive      Speech:  normal      Thought Process/Content: Logical      Affective Functioning: Congruent      Mood: euthymic      Level of consciousness:  Alert and Attentive      Response to Learning: Progressing to goal      Endings: None Reported    Modes of Intervention: Support, Socialization, Exploration, Clarifying and Problem-solving      Discipline Responsible: Psychoeducational Specialist      Signature:  Ben Helm MM, MT-BC

## 2022-05-13 NOTE — GROUP NOTE
Group Therapy Note    Date: 5/13/2022    Group Start Time: 11:15 AM  Group End Time: 12:15 PM  Group Topic: Cognitive Skills    [facility] [address]        Group Therapy Note    Attendees: 9         Patient's Goal:  To learn and discuss unhealthy and healthy relationships. Pt's filled out worksheet regarding if their relationships are healthy and they discussed with the group. Pt's given information regarding healthy support systems in the community. Notes:  Pt actively participated in the group discussion and filled out the worksheet on relationships. Status After Intervention:  Improved    Participation Level:  Active Listener and Interactive    Participation Quality: Appropriate, Attentive, Sharing and Supportive      Speech:  normal      Thought Process/Content: Logical      Affective Functioning: Congruent      Mood: euthymic      Level of consciousness:  Alert, Oriented x4 and Attentive      Response to Learning: Able to verbalize current knowledge/experience and Able to verbalize/acknowledge new learning      Endings: None Reported    Modes of Intervention: Education, Support, Socialization, Exploration and Clarifying      Discipline Responsible: /Counselor      Signature:  [address]ARE[address]

## 2022-05-13 NOTE — GROUP NOTE
Group Therapy Note    Date: 5/13/2022    Group Start Time: 10:15 AM  Group End Time: 11:00 AM  Group Topic: Psychoeducation    [facility]    [address]        Group Therapy Note    Attendees: 9    Participants learned how to create a SMART Goal then created their own and discussed them with the group. Notes:  Jeanie Cotto attended group and was engaged in learning about SMART Goals. Jeanie Cotto then made his own SMART Goal to get healthier and lose weight.     Status After Intervention:  Improved    Participation Level: Interactive    Participation Quality: Appropriate, Attentive, Sharing and Supportive      Speech:  normal      Thought Process/Content: Logical      Affective Functioning: Congruent      Mood: euthymic      Level of consciousness:  Alert, Oriented x4 and Attentive      Response to Learning: Able to verbalize/acknowledge new learning, Capable of insight and Progressing to goal      Endings: None Reported    Modes of Intervention: Education and Activity      Discipline Responsible: /Counselor      Signature:  [address]ARE[address]

## 2022-05-16 ENCOUNTER — HOSPITAL ENCOUNTER (OUTPATIENT)
Dept: PSYCHIATRY | Age: 57
Setting detail: THERAPIES SERIES
Discharge: HOME | End: 2022-05-16
Payer: COMMERCIAL

## 2022-05-16 DIAGNOSIS — F33.2 MAJOR DEPRESSIVE DISORDER, RECURRENT SEVERE WITHOUT PSYCHOTIC FEATURES (HCC): ICD-10-CM

## 2022-05-16 PROCEDURE — 90853 GROUP PSYCHOTHERAPY: CPT

## 2022-05-16 PROCEDURE — 90853 GROUP PSYCHOTHERAPY: CPT | Performed by: SOCIAL WORKER

## 2022-05-16 NOTE — BH NOTE
Pt requesting refills on 200 mg Zoloft and 3 mg Prazosin. Johny approved. Writer called in to Vale Gong in [employer].

## 2022-05-16 NOTE — GROUP NOTE
Group Therapy Note    Date: 5/16/2022    Group Start Time: 10:00 AM  Group End Time: 11:00 AM  Group Topic: Psychoeducation    [facility]    JENNIFFER Medrano        Group Therapy Note  The clinician introduced the Stress and the Body lesson for them to understand why people get stressed and anxious. The clinician went over the handout and discussed fight, flight and freeze functions that the body naturally reacts as a survival instinct. The clinician taught a few coping skills to help lower the anxiety/panic response. Attendees: 9         Patient's Goal:      Notes:  Patient was cooperative and fully engaged in the group discussion and activity. He shared how group and learning coping skills has helped him versus just taking medications alone. Status After Intervention:  Improved    Participation Level:  Active Listener and Interactive    Participation Quality: Appropriate, Attentive, Sharing and Supportive      Speech:  normal      Thought Process/Content: Logical      Affective Functioning: Congruent      Mood: depressed      Level of consciousness:  Alert      Response to Learning: Able to verbalize/acknowledge new learning      Endings: None Reported    Modes of Intervention: Education, Support, Exploration and Activity      Discipline Responsible: /Counselor      Signature:  JENNIFFER Medrano

## 2022-05-16 NOTE — GROUP NOTE
Group Therapy Note    Date: 5/16/2022    Group Start Time: 11:15 AM  Group End Time: 12:15 PM  Group Topic: Psychoeducation    [facility]    [address]        Group Therapy Note    Attendees: 9    Participants learned about mindfulness and grounding techniques and practiced some of the new techniques learned. Notes:  Ophelia Matson attended group today and was engaged in learning about mindfulness and grounding techniques. Ophelia Matson said he would like to do some mindfulness outside this week. Status After Intervention:  Improved    Participation Level:  Active Listener and Interactive    Participation Quality: Appropriate, Attentive, Sharing and Supportive      Speech:  normal      Thought Process/Content: Logical      Affective Functioning: Congruent      Mood: euthymic      Level of consciousness:  Alert, Oriented x4 and Attentive      Response to Learning: Able to verbalize/acknowledge new learning and Progressing to goal      Endings: None Reported    Modes of Intervention: Education and Activity      Discipline Responsible: /Counselor      Signature:  MARE[address] Velasco

## 2022-05-16 NOTE — GROUP NOTE
Group Therapy Note    Date: 5/16/2022    Group Start Time:  8:30 AM  Group End Time:  9:45 AM  Group Topic: Psychotherapy    [facility]    Randyan[place] [facility]obal        Group Therapy Note    Attendees: 9         Patient's Goal:  Patient will set goal and agenda for engagement in psychotherapy, use structure of group dynamics to explore behaviors in healthy communication and relationships. Notes:  During agenda-setting, Chas Faye set intention to give input when he could, with no specific topics he wanted to address during group process. Chas Faye described that he ran out of his Prazosin prescription resulting in increased nightmares over the weekend, citing lack of medication and stress pending d/c from IOP this week as stressors contributing to nightmares. Chas Faye was humorous and engaging with peers during session, positive in contributions to group discussions, asking peers \"What brings you helio? \" Chas Faye expressed positive outcomes from prioritizing helio in changing his mindset. Status After Intervention:  Improved    Participation Level:  Active Listener and Interactive    Participation Quality: Sharing and Supportive      Speech:  normal      Thought Process/Content: Logical      Affective Functioning: Congruent      Mood: euthymic      Level of consciousness:  Alert and Attentive      Response to Learning: Able to verbalize current knowledge/experience, Able to verbalize/acknowledge new learning and Progressing to goal      Endings: None Reported    Modes of Intervention: Support, Socialization, Exploration, Clarifying and Problem-solving      Discipline Responsible: Psychoeducational Specialist      Signature:  Radha Hurley MM, MT-BC

## 2022-05-18 NOTE — CARE COORDINATION
Outpatient Treatment Team Progress Note  Date of Review: 05/10/2022        Multidisciplinary Outpatient Treatment Team met to discuss and review patient's progress in group and individual therapy sessions.         Presenting Problem: PTSD, Depression, Grief     Patient's Current Treatment Status: Keyon continues to demonstrate improved assertive communication with peers, actively pursuing opportunities to provide feedback. Jody Luna continues to build coping skills related to struggles with acceptance of past life situations, grief and shame. Per treatment team, Jody Luna may benefit from EMDR individual therapy services following completion of IOP.  Stanley Bailey will be reaching out to patient.      Treatment Start Date: 3/21/2022     Treatment Step-Down Date: N/A     Tentative Discharge Date: 5/20/2022     Darylene Brodie, Program RN
TBD

## 2022-05-19 ENCOUNTER — HOSPITAL ENCOUNTER (OUTPATIENT)
Dept: PSYCHIATRY | Age: 57
Setting detail: THERAPIES SERIES
Discharge: HOME | End: 2022-05-19
Payer: COMMERCIAL

## 2022-05-19 DIAGNOSIS — F33.2 MAJOR DEPRESSIVE DISORDER, RECURRENT SEVERE WITHOUT PSYCHOTIC FEATURES (HCC): ICD-10-CM

## 2022-05-19 PROCEDURE — 90853 GROUP PSYCHOTHERAPY: CPT

## 2022-05-19 PROCEDURE — 90853 GROUP PSYCHOTHERAPY: CPT | Performed by: SOCIAL WORKER

## 2022-05-19 NOTE — GROUP NOTE
Group Therapy Note    Date: 5/19/2022    Group Start Time: 10:15 AM  Group End Time: 11:00 AM  Group Topic: Psychoeducation    [facility]    [address]        Group Therapy Note    Attendees: 9    Participants practiced Mindfulness by engaging in a mindfulness walk. Notes:  Kevin Mares attended group and participated in the Mindfulness activity but he did not participate in walking as much as others. He studied the ground, walked into the field along the path and took in his sights and sounds. Status After Intervention:  Improved    Participation Level:  Active Listener and Interactive    Participation Quality: Appropriate, Attentive and Sharing      Speech:  normal      Thought Process/Content: Logical      Affective Functioning: Congruent      Mood: euthymic      Level of consciousness:  Alert, Oriented x4 and Attentive      Response to Learning: Able to verbalize/acknowledge new learning and Progressing to goal      Endings: None Reported    Modes of Intervention: Education, Exploration and Activity      Discipline Responsible: /Counselor      Signature:  [address]

## 2022-05-19 NOTE — GROUP NOTE
Group Therapy Note    Date: 5/19/2022    Group Start Time:  8:30 AM  Group End Time:  9:45 AM  Group Topic: Psychotherapy    [facility]    Edwin Cristobal        Group Therapy Note    Attendees: 9         Patient's Goal:   Patient will set goal and agenda for engagement in psychotherapy, use structure of group dynamics to explore behaviors in healthy communication and relationships. Notes:  During agenda setting, Ursula Nolan acknowledged that his main goal is to 'just get through' last 2 days of treatment, citing increased anxiety related to returning to work. Across session, Homer Slaughter appeared attentive to peers with nonverbal responses to group members, shaking head and rolling eyes multiple times. When prompted to engage in collaborative discussions, Stephanie Brilliant his head 'no' indicating a desire to refrain.     Status After Intervention:  Unchanged    Participation Level: Minimal    Participation Quality: Resistant      Speech:  mute      Thought Process/Content: Linear      Affective Functioning: Blunted      Mood: depressed      Level of consciousness:  Alert and Attentive      Response to Learning: Capable of insight and Progressing to goal      Endings: None Reported    Modes of Intervention: Support, Exploration, Clarifying and Problem-solving      Discipline Responsible: Psychoeducational Specialist      Signature:  Waldo Faulkner, MM, MT-BC

## 2022-05-19 NOTE — GROUP NOTE
Group Therapy Note    Date: 5/19/2022    Group Start Time: 11:15 AM  Group End Time: 12:15 PM  Group Topic: Psychoeducation    [facility]    JENNIFFER Mendez        Group Therapy Note  Clinician introduced the Reactions should match the size of the problems. The clinician had the group members chart different problems and list them under the correct size of the problem. The group members participated in the activity and discussed how the either have over reacted or under reacted to each problem. As a group they were able to help each other come up with more appropriate responses the the problems they listed. Attendees: 7         Patient's Goal:      Notes:  Patient was cooperative and fully engaged in the group discussion and activity. He provided feedback to other members and gave examples of problems that he has overreacted to for the group to discuss. Status After Intervention:  Improved    Participation Level:  Active Listener and Interactive    Participation Quality: Attentive      Speech:  normal      Thought Process/Content: Linear      Affective Functioning: Constricted/Restricted      Mood: anxious      Level of consciousness:  Attentive      Response to Learning: Able to retain information      Endings: None Reported    Modes of Intervention: Education, Exploration and Activity      Discipline Responsible: /Counselor      Signature:  JENNIFFER Mendez

## 2022-05-20 ENCOUNTER — HOSPITAL ENCOUNTER (OUTPATIENT)
Dept: PSYCHIATRY | Age: 57
Setting detail: THERAPIES SERIES
Discharge: HOME | End: 2022-05-20
Payer: COMMERCIAL

## 2022-05-20 ENCOUNTER — TELEPHONE (OUTPATIENT)
Dept: FAMILY MEDICINE CLINIC | Age: 57
End: 2022-05-20

## 2022-05-20 DIAGNOSIS — F33.2 MAJOR DEPRESSIVE DISORDER, RECURRENT SEVERE WITHOUT PSYCHOTIC FEATURES (HCC): ICD-10-CM

## 2022-05-20 PROCEDURE — 90853 GROUP PSYCHOTHERAPY: CPT

## 2022-05-20 PROCEDURE — 99239 HOSP IP/OBS DSCHRG MGMT >30: CPT | Performed by: PSYCHIATRY & NEUROLOGY

## 2022-05-20 PROCEDURE — 90853 GROUP PSYCHOTHERAPY: CPT | Performed by: SOCIAL WORKER

## 2022-05-20 RX ORDER — SERTRALINE HYDROCHLORIDE 100 MG/1
100 TABLET, FILM COATED ORAL DAILY
Qty: 60 TABLET | Refills: 0 | Status: SHIPPED | OUTPATIENT
Start: 2022-05-20 | End: 2022-05-25

## 2022-05-20 RX ORDER — PRAZOSIN HYDROCHLORIDE 5 MG/1
5 CAPSULE ORAL NIGHTLY
Qty: 30 CAPSULE | Refills: 0 | Status: SHIPPED | OUTPATIENT
Start: 2022-05-20

## 2022-05-20 RX ORDER — SERTRALINE HYDROCHLORIDE 100 MG/1
200 TABLET, FILM COATED ORAL DAILY
Status: DISCONTINUED | OUTPATIENT
Start: 2022-05-20 | End: 2022-05-21 | Stop reason: HOSPADM

## 2022-05-20 RX ORDER — PRAZOSIN HYDROCHLORIDE 1 MG/1
5 CAPSULE ORAL NIGHTLY
Status: DISCONTINUED | OUTPATIENT
Start: 2022-05-20 | End: 2022-05-21 | Stop reason: HOSPADM

## 2022-05-20 RX ORDER — PRAZOSIN HYDROCHLORIDE 5 MG/1
5 CAPSULE ORAL NIGHTLY
Qty: 30 CAPSULE | Refills: 0 | Status: SHIPPED | OUTPATIENT
Start: 2022-05-20 | End: 2022-05-20 | Stop reason: SDUPTHER

## 2022-05-20 RX ORDER — PRAZOSIN HYDROCHLORIDE 1 MG/1
3 CAPSULE ORAL NIGHTLY
Qty: 90 CAPSULE | Refills: 0 | Status: SHIPPED
Start: 2022-05-20 | End: 2022-05-20 | Stop reason: HOSPADM

## 2022-05-20 NOTE — GROUP NOTE
Group Therapy Note    Date: 5/20/2022    Group Start Time:  8:30 AM  Group End Time:  9:45 AM  Group Topic: Psychotherapy    [facility]    Edwin Cristobal        Group Therapy Note    Attendees: 9         Patient's Goal:  Patient will set goal and agenda for engagement in psychotherapy, use structure of group dynamics to explore behaviors in healthy communication and relationships. Notes:  JR was tearful during agenda-setting, reporting mixed feelings anticipating d/c from IOP. Radha Duffy provided feedback to peer while discussing individual experiences during the grief process, relating this to previous discussions of time and the need for patience as individuals move their experiences IN THEIR OWN TIME. At close of session, Radha Duffy praised peers stating \"This is what love feels like. I feel like I know you all. \"    Status After Intervention:  Improved    Participation Level:  Active Listener and Interactive    Participation Quality: Sharing and Supportive      Speech:  normal      Thought Process/Content: Logical      Affective Functioning: Congruent      Mood: euthymic      Level of consciousness:  Alert and Oriented x4      Response to Learning: Able to verbalize current knowledge/experience, Able to verbalize/acknowledge new learning and Progressing to goal      Endings: None Reported    Modes of Intervention: Support, Socialization, Exploration, Clarifying and Problem-solving      Discipline Responsible: Psychoeducational Specialist      Signature:  John Ashraf MM, MT-BC

## 2022-05-20 NOTE — TELEPHONE ENCOUNTER
Clarification is needed to fill prescription. sertraline (ZOLOFT) 100 MG tablet     Says to Take 1 tablet by mouth daily Take 2 tablets by mouth daily.     Please contact Pharmacy [phone]

## 2022-05-20 NOTE — GROUP NOTE
Group Therapy Note    Date: 5/20/2022    Group Start Time: 10:15 AM  Group End Time: 11:10 AM  Group Topic: Psychoeducation    [facility]    MAREK Carey        Group Therapy Note    Attendees: 9    Participants learned about [place] to Getting Through Difficult Conversations, using \"I\" statements and Assertive, Aggressive and Passive Communication Styles. Notes:  Rosanna Charles attended group and was engaged in learning about effective communication skills but was mostly quiet. Today is Keyon's last day in the program and after group he said he was just trying to soak it all in. Status After Intervention:  Improved    Participation Level:  Active Listener and Interactive    Participation Quality: Appropriate and Attentive      Speech:  normal      Thought Process/Content: Logical      Affective Functioning: Congruent      Mood: euthymic      Level of consciousness:  Alert, Oriented x4 and Attentive      Response to Learning: Progressing to goal      Endings: None Reported    Modes of Intervention: Education and Problem-solving      Discipline Responsible: /Counselor      Signature:  MAREK Carey
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
## 2022-05-20 NOTE — GROUP NOTE
Group Therapy Note    Date: 5/20/2022    Group Start Time: 11:15 AM  Group End Time: 12:15 PM  Group Topic: Psychoeducation    [facility]    JENNIFFER Mendez        Group Therapy Note  Clinician had the group do an free mindfulness time. They had the choice to color a mandala, make a good bye card for a member's last day and they were able to process the grief of losing a group member today. Attendees: 8         Patient's Goal:      Notes:   Patient was cooperative and fully engaged in the group discussion and activity. Patient was able to listen to all of the group members read their goodbye letter or goodbye thoughts. He cried and processed the heart felt sentiments with the group members. He shared word with each member about how they impacted him and how he has learned from each one of them. Status After Intervention:  Improved    Participation Level:  Active Listener and Interactive    Participation Quality: Appropriate, Attentive, Sharing and Supportive      Speech:  normal      Thought Process/Content: Logical      Affective Functioning: Congruent      Mood: depressed      Level of consciousness:  Oriented x4      Response to Learning: Able to verbalize/acknowledge new learning      Endings: None Reported    Modes of Intervention: Socialization, Exploration and Activity      Discipline Responsible: /Counselor      Signature:  JENNIFFER Mendez

## 2022-05-20 NOTE — BH NOTE
Discharge Planning is Complete. Discharge Date: 5/20/2022  Discharge Diagnosis: PTSD, Major Depression    Your instructions: You completed the [facility] Intensive Outpatient Program.  Call with any questions you may have. Please note that we have a patient family advisory [place] that meets the second Wednesday of January and the second Wednesday of July at [address] [facility] in [place] at [facility]. Department leadership would love for you to attend to give feedback on what we are doing well and areas in which we can improve our patient care. Please come if you are able and feel free to reach out to [facility] at [phone] with any questions. The crisis number for [facility] is [phone]. You can use this number at any time to access emergency mental health services. Please follow up with your PCP regarding any pending labs. You are being referred to the psychotherapy services of [facility]. Your appointment has been scheduled on the following day:  Name of Provider: Doris JARA  Provider specialty/license: social work  Date and time of appointment: June 1st, 2022, at 11:00 AM  The type/s of services requested are: therapy  Agency name: [facility]  Address: [address] [place] [address] [place], [address] [place] [address], [facility]  Phone Number: [phone]  Special instructions (what to bring to appointment, etc.): Call [phone] to provide this agency with your email address so that you can have your patient portal emailed to you. **With the current concerns for Coronavirus (COVID-19), please contact your providers prior to going in to their offices. [facility] and agencies have adjusted their practices to reduce spread of the illness. If you have any questions about the virus or recommendations for home care, please call the 24/[facility]) COVID-19 hotline at [phone]. For all emergencies, please contact 911. **    Discharge Completed By: Claudia Lizama LSW  Fax to: Follow up provider name and number  [facility] [phone]    The following personal items were collected during your admission and were returned to you:         By signing below, I understand and acknowledge receipt of the instructions indicated above.

## 2022-05-25 RX ORDER — SERTRALINE HYDROCHLORIDE 100 MG/1
200 TABLET, FILM COATED ORAL DAILY
Qty: 60 TABLET | Refills: 5 | Status: SHIPPED | OUTPATIENT
Start: 2022-05-25

## 2022-05-25 NOTE — TELEPHONE ENCOUNTER
Patient know Dr. Diana Dimas refilled his Zoloft to his local pharmacy    Does He have follow-up with psychiatry?

## 2022-06-02 ENCOUNTER — CLINICAL DOCUMENTATION (OUTPATIENT)
Dept: SPIRITUAL SERVICES | Age: 57
End: 2022-06-02

## 2022-06-02 NOTE — FLOWSHEET NOTE
Attempted to reach Pt per referral from Outpatient [facility] treatment team. No answer on phone. Left voicemail for Pt and will attempt to follow up again at a later time.     [id number] [facility] Associate       06/02/22 1247   Encounter Summary   Encounter Overview/Reason  Attempted Encounter   Service Provided For: Patient not available   Referral/Consult From: Multi-disciplinary team   Last Encounter    (6/2 attempted call OP, left VM)   Complexity of Encounter Low   Begin Time 1244   End Time  1248   Total Time Calculated 4 min

## 2022-06-24 ENCOUNTER — CLINICAL DOCUMENTATION (OUTPATIENT)
Dept: SPIRITUAL SERVICES | Age: 57
End: 2022-06-24

## 2022-07-08 ENCOUNTER — CLINICAL DOCUMENTATION (OUTPATIENT)
Dept: SPIRITUAL SERVICES | Age: 57
End: 2022-07-08

## 2022-07-08 NOTE — FLOWSHEET NOTE
07/08/22 1349   Encounter Summary   Encounter Overview/Reason  Behavioral Health; Attempted Encounter   Service Provided For: Patient not available   Referral/Consult From: Multi-disciplinary team   Last Encounter    (7/8 attempted call OP, no answer, mailing closing note)   Complexity of Encounter Low   Begin Time 1345   End Time  1351   Total Time Calculated 6 min

## 2022-11-18 RX ORDER — ASPIRIN 81 MG
TABLET,CHEWABLE ORAL
Qty: 90 TABLET | Refills: 3 | Status: SHIPPED | OUTPATIENT
Start: 2022-11-18

## 2022-11-18 NOTE — TELEPHONE ENCOUNTER
Medication:   Requested Prescriptions     Pending Prescriptions Disp Refills    ASPIRIN LOW DOSE 81 MG chewable tablet [Pharmacy Med Name: ASPIRIN 81 MG CHEWABLE TABLET] 30 tablet 3     Sig: CHEW ONE TABLET BY MOUTH DAILY        Last Filled:  4/26/2022    Patient Phone Number: [phone] (home) [phone] (work)    Last appt: 5/10/2022   Next appt: Visit date not found    Last OARRS: No flowsheet data found.

## 2023-03-12 DIAGNOSIS — E79.0 ELEVATED URIC ACID IN BLOOD: ICD-10-CM

## 2023-03-13 RX ORDER — PANTOPRAZOLE SODIUM 40 MG/1
TABLET, DELAYED RELEASE ORAL
Qty: 90 TABLET | Refills: 0 | Status: SHIPPED | OUTPATIENT
Start: 2023-03-13

## 2023-03-13 RX ORDER — OLMESARTAN MEDOXOMIL AND HYDROCHLOROTHIAZIDE 40/12.5 40; 12.5 MG/1; MG/1
1 TABLET ORAL DAILY
Qty: 90 TABLET | Refills: 0 | Status: SHIPPED | OUTPATIENT
Start: 2023-03-13

## 2023-03-13 RX ORDER — ALLOPURINOL 100 MG/1
200 TABLET ORAL DAILY
Qty: 180 TABLET | Refills: 0 | Status: SHIPPED | OUTPATIENT
Start: 2023-03-13

## 2023-03-13 NOTE — TELEPHONE ENCOUNTER
Medication:   Requested Prescriptions     Pending Prescriptions Disp Refills    olmesartan-hydroCHLOROthiazide (BENICAR HCT) 40-12.5 MG per tablet [Pharmacy Med Name: OLMESARTAN MEDOX/HCTZ 40-12.5MG TAB] 90 tablet 1     Sig: TAKE 1 TABLET BY MOUTH DAILY    allopurinol (ZYLOPRIM) 100 MG tablet [Pharmacy Med Name: ALLOPURINOL 100MG TABLETS] 180 tablet 2     Sig: TAKE 2 TABLETS BY MOUTH DAILY    pantoprazole (PROTONIX) 40 MG tablet [Pharmacy Med Name: PANTOPRAZOLE 40MG TABLETS] 90 tablet 3     Sig: TAKE 1 TABLET BY MOUTH EVERY MORNING BEFORE BREAKFAST       Last Filled:  5/10/2022 (benicar) 2/15/2022 (allopurinol) 3/16/2022 (protonix)     Patient Phone Number: [phone] (home) [phone] (work)    Last appt: 5/10/2022   Next appt: Visit date not found    Lab Results   Component Value Date     04/09/2022    K 4.2 04/09/2022     04/09/2022    CO2 26 04/09/2022    BUN 18 04/09/2022    CREATININE 1.5 (H) 04/09/2022    GLUCOSE 109 (H) 04/09/2022    CALCIUM 9.3 04/09/2022    PROT 7.3 04/09/2022    LABALBU 4.1 04/09/2022    BILITOT 0.8 04/09/2022    ALKPHOS 75 04/09/2022    AST 17 04/09/2022    ALT 18 04/09/2022    LABGLOM 48 (A) 04/09/2022    GFRAA 59 (A) 04/09/2022    AGRATIO 1.3 04/09/2022    GLOB 2.4 02/21/2020

## 2023-07-12 DIAGNOSIS — E79.0 ELEVATED URIC ACID IN BLOOD: ICD-10-CM

## 2023-07-12 DIAGNOSIS — E78.2 MIXED HYPERLIPIDEMIA: ICD-10-CM

## 2023-07-12 RX ORDER — ATORVASTATIN CALCIUM 10 MG/1
10 TABLET, FILM COATED ORAL NIGHTLY
Qty: 30 TABLET | Refills: 0 | Status: SHIPPED | OUTPATIENT
Start: 2023-07-12

## 2023-07-12 RX ORDER — ASPIRIN 81 MG/1
TABLET, CHEWABLE ORAL
Qty: 30 TABLET | Refills: 0 | Status: SHIPPED | OUTPATIENT
Start: 2023-07-12

## 2023-07-12 RX ORDER — PANTOPRAZOLE SODIUM 40 MG/1
40 TABLET, DELAYED RELEASE ORAL
Qty: 30 TABLET | Refills: 0 | Status: SHIPPED | OUTPATIENT
Start: 2023-07-12

## 2023-07-12 RX ORDER — OLMESARTAN MEDOXOMIL AND HYDROCHLOROTHIAZIDE 40/12.5 40; 12.5 MG/1; MG/1
1 TABLET ORAL DAILY
Qty: 30 TABLET | Refills: 0 | Status: SHIPPED | OUTPATIENT
Start: 2023-07-12

## 2023-07-12 RX ORDER — ALLOPURINOL 100 MG/1
200 TABLET ORAL DAILY
Qty: 60 TABLET | Refills: 0 | Status: SHIPPED | OUTPATIENT
Start: 2023-07-12

## 2023-07-12 NOTE — TELEPHONE ENCOUNTER
Medication:   Requested Prescriptions     Pending Prescriptions Disp Refills    allopurinol (ZYLOPRIM) 100 MG tablet 180 tablet 0     Sig: Take 2 tablets by mouth daily    pantoprazole (PROTONIX) 40 MG tablet 90 tablet 0     Sig: Take 1 tablet by mouth every morning (before breakfast)    olmesartan-hydroCHLOROthiazide (BENICAR HCT) 40-12.5 MG per tablet 90 tablet 0     Sig: Take 1 tablet by mouth daily    aspirin (ASPIRIN LOW DOSE) 81 MG chewable tablet 90 tablet 3    atorvastatin (LIPITOR) 10 MG tablet 90 tablet 3     Sig: Take 1 tablet by mouth nightly       Last Filled:  3/16/2022 (lipitor) 11/18/2022 (aspirin) 3/13/2023 all other medications     Patient Phone Number: [phone] (home) [phone] (work)    Last appt: 5/10/2022   Next appt: 7/19/2023    Last Lipid:   Lab Results   Component Value Date/Time    CHOL 123 04/09/2022 06:14 AM    TRIG 113 04/09/2022 06:14 AM    HDL 40 04/09/2022 06:14 AM    HDL 51 06/04/2012 06:58 AM    LDLCALC 60 04/09/2022 06:14 AM

## 2023-07-12 NOTE — TELEPHONE ENCOUNTER
Medication and Quantity requested: allopurinol (ZYLOPRIM) 100 MG tablet     pantoprazole (PROTONIX) 40 MG tablet [    olmesartan-hydroCHLOROthiazide (BENICAR HCT) 40-12.5 MG per tablet    ASPIRIN LOW DOSE 81 MG chewable tablet     atorvastatin (LIPITOR) 10 MG tablet      Last Visit  5/10/2022      Pharmacy and phone number updated in EPIC:  yes

## 2023-07-19 ENCOUNTER — OFFICE VISIT (OUTPATIENT)
Dept: FAMILY MEDICINE CLINIC | Age: 58
End: 2023-07-19
Payer: COMMERCIAL

## 2023-07-19 VITALS
HEIGHT: 69 IN | HEART RATE: 66 BPM | DIASTOLIC BLOOD PRESSURE: 74 MMHG | SYSTOLIC BLOOD PRESSURE: 113 MMHG | RESPIRATION RATE: 16 BRPM | WEIGHT: 295.6 LBS | OXYGEN SATURATION: 95 % | BODY MASS INDEX: 43.78 KG/M2

## 2023-07-19 DIAGNOSIS — E79.0 ELEVATED URIC ACID IN BLOOD: ICD-10-CM

## 2023-07-19 DIAGNOSIS — Z01.84 IMMUNITY STATUS TESTING: ICD-10-CM

## 2023-07-19 DIAGNOSIS — I10 HYPERTENSION, ESSENTIAL: ICD-10-CM

## 2023-07-19 DIAGNOSIS — M25.50 ARTHRALGIA, UNSPECIFIED JOINT: ICD-10-CM

## 2023-07-19 DIAGNOSIS — E66.3 OVERWEIGHT ON EXAMINATION: ICD-10-CM

## 2023-07-19 DIAGNOSIS — E78.2 MIXED HYPERLIPIDEMIA: ICD-10-CM

## 2023-07-19 DIAGNOSIS — Z76.0 ENCOUNTER FOR MEDICATION REFILL: ICD-10-CM

## 2023-07-19 DIAGNOSIS — N18.31 CHRONIC KIDNEY DISEASE, STAGE 3A (HCC): ICD-10-CM

## 2023-07-19 DIAGNOSIS — C61 PROSTATE CANCER (HCC): ICD-10-CM

## 2023-07-19 DIAGNOSIS — I10 HYPERTENSION, ESSENTIAL: Primary | ICD-10-CM

## 2023-07-19 LAB
BASOPHILS # BLD: 0 K/UL (ref 0–0.2)
BASOPHILS NFR BLD: 0.4 %
DEPRECATED RDW RBC AUTO: 14.6 % (ref 12.4–15.4)
EOSINOPHIL # BLD: 0.2 K/UL (ref 0–0.6)
EOSINOPHIL NFR BLD: 2.5 %
ERYTHROCYTE [SEDIMENTATION RATE] IN BLOOD BY WESTERGREN METHOD: 31 MM/HR (ref 0–20)
HCT VFR BLD AUTO: 41.5 % (ref 40.5–52.5)
HGB BLD-MCNC: 14.4 G/DL (ref 13.5–17.5)
LYMPHOCYTES # BLD: 3.9 K/UL (ref 1–5.1)
LYMPHOCYTES NFR BLD: 53.8 %
MCH RBC QN AUTO: 33.4 PG (ref 26–34)
MCHC RBC AUTO-ENTMCNC: 34.6 G/DL (ref 31–36)
MCV RBC AUTO: 96.7 FL (ref 80–100)
MONOCYTES # BLD: 0.8 K/UL (ref 0–1.3)
MONOCYTES NFR BLD: 10.4 %
NEUTROPHILS # BLD: 2.4 K/UL (ref 1.7–7.7)
NEUTROPHILS NFR BLD: 32.9 %
PLATELET # BLD AUTO: 191 K/UL (ref 135–450)
PMV BLD AUTO: 9.2 FL (ref 5–10.5)
RBC # BLD AUTO: 4.29 M/UL (ref 4.2–5.9)
WBC # BLD AUTO: 7.2 K/UL (ref 4–11)

## 2023-07-19 PROCEDURE — 3074F SYST BP LT 130 MM HG: CPT | Performed by: FAMILY MEDICINE

## 2023-07-19 PROCEDURE — 99214 OFFICE O/P EST MOD 30 MIN: CPT | Performed by: FAMILY MEDICINE

## 2023-07-19 PROCEDURE — 90714 TD VACC NO PRESV 7 YRS+ IM: CPT | Performed by: FAMILY MEDICINE

## 2023-07-19 PROCEDURE — 90471 IMMUNIZATION ADMIN: CPT | Performed by: FAMILY MEDICINE

## 2023-07-19 PROCEDURE — 3078F DIAST BP <80 MM HG: CPT | Performed by: FAMILY MEDICINE

## 2023-07-19 RX ORDER — ATORVASTATIN CALCIUM 10 MG/1
10 TABLET, FILM COATED ORAL NIGHTLY
Qty: 90 TABLET | Refills: 3 | Status: SHIPPED | OUTPATIENT
Start: 2023-07-19

## 2023-07-19 RX ORDER — ALLOPURINOL 100 MG/1
200 TABLET ORAL DAILY
Qty: 180 TABLET | Refills: 3 | Status: SHIPPED | OUTPATIENT
Start: 2023-07-19

## 2023-07-19 RX ORDER — ASPIRIN 81 MG/1
TABLET, CHEWABLE ORAL
Qty: 90 TABLET | Refills: 3 | Status: SHIPPED | OUTPATIENT
Start: 2023-07-19

## 2023-07-19 RX ORDER — OLMESARTAN MEDOXOMIL AND HYDROCHLOROTHIAZIDE 40/12.5 40; 12.5 MG/1; MG/1
1 TABLET ORAL DAILY
Qty: 90 TABLET | Refills: 3 | Status: SHIPPED | OUTPATIENT
Start: 2023-07-19

## 2023-07-19 RX ORDER — PANTOPRAZOLE SODIUM 40 MG/1
40 TABLET, DELAYED RELEASE ORAL
Qty: 90 TABLET | Refills: 3 | Status: SHIPPED | OUTPATIENT
Start: 2023-07-19

## 2023-07-19 SDOH — ECONOMIC STABILITY: INCOME INSECURITY: HOW HARD IS IT FOR YOU TO PAY FOR THE VERY BASICS LIKE FOOD, HOUSING, MEDICAL CARE, AND HEATING?: NOT HARD AT ALL

## 2023-07-19 SDOH — ECONOMIC STABILITY: FOOD INSECURITY: WITHIN THE PAST 12 MONTHS, THE FOOD YOU BOUGHT JUST DIDN'T LAST AND YOU DIDN'T HAVE MONEY TO GET MORE.: NEVER TRUE

## 2023-07-19 SDOH — ECONOMIC STABILITY: FOOD INSECURITY: WITHIN THE PAST 12 MONTHS, YOU WORRIED THAT YOUR FOOD WOULD RUN OUT BEFORE YOU GOT MONEY TO BUY MORE.: NEVER TRUE

## 2023-07-19 SDOH — ECONOMIC STABILITY: HOUSING INSECURITY
IN THE LAST 12 MONTHS, WAS THERE A TIME WHEN YOU DID NOT HAVE A STEADY PLACE TO SLEEP OR SLEPT IN A SHELTER (INCLUDING NOW)?: NO

## 2023-07-19 ASSESSMENT — PATIENT HEALTH QUESTIONNAIRE - PHQ9
3. TROUBLE FALLING OR STAYING ASLEEP: 3
8. MOVING OR SPEAKING SO SLOWLY THAT OTHER PEOPLE COULD HAVE NOTICED. OR THE OPPOSITE, BEING SO FIGETY OR RESTLESS THAT YOU HAVE BEEN MOVING AROUND A LOT MORE THAN USUAL: 0
6. FEELING BAD ABOUT YOURSELF - OR THAT YOU ARE A FAILURE OR HAVE LET YOURSELF OR YOUR FAMILY DOWN: 0
2. FEELING DOWN, DEPRESSED OR HOPELESS: 0
SUM OF ALL RESPONSES TO PHQ QUESTIONS 1-9: 8
SUM OF ALL RESPONSES TO PHQ9 QUESTIONS 1 & 2: 0
4. FEELING TIRED OR HAVING LITTLE ENERGY: 2
9. THOUGHTS THAT YOU WOULD BE BETTER OFF DEAD, OR OF HURTING YOURSELF: 0
10. IF YOU CHECKED OFF ANY PROBLEMS, HOW DIFFICULT HAVE THESE PROBLEMS MADE IT FOR YOU TO DO YOUR WORK, TAKE CARE OF THINGS AT HOME, OR GET ALONG WITH OTHER PEOPLE: 0
SUM OF ALL RESPONSES TO PHQ QUESTIONS 1-9: 8
7. TROUBLE CONCENTRATING ON THINGS, SUCH AS READING THE NEWSPAPER OR WATCHING TELEVISION: 0
SUM OF ALL RESPONSES TO PHQ QUESTIONS 1-9: 8
SUM OF ALL RESPONSES TO PHQ QUESTIONS 1-9: 8
1. LITTLE INTEREST OR PLEASURE IN DOING THINGS: 0
5. POOR APPETITE OR OVEREATING: 3

## 2023-07-19 NOTE — PROGRESS NOTES
Rate; Future  - C-Reactive Protein; Future    3. Encounter for medication refill  Refilled all medications today    4. Mixed hyperlipidemia  Continue 10 mg atorvastatin nightly, refill and check labs  - Comprehensive Metabolic Panel; Future  - Lipid Panel; Future  - atorvastatin (LIPITOR) 10 MG tablet; Take 1 tablet by mouth nightly  Dispense: 90 tablet; Refill: 3    5. Prostate cancer ([address])  Treated in the past, recheck PSA  - PSA Screening; Future    6. Chronic kidney disease, stage 3a (HCC)  Screen  - Comprehensive Metabolic Panel; Future    7. Elevated uric acid in blood  Supposed to be taking allopurinol 200 mg daily, appears that he has run out, refill, check labs  - TSH with Reflex; Future  - allopurinol (ZYLOPRIM) 100 MG tablet; Take 2 tablets by mouth daily  Dispense: 180 tablet; Refill: 3  - Uric Acid; Future    8. Overweight on examination  He is aware he needs to eat healthy and continue to stay active  - Hemoglobin A1C; Future  - TSH with Reflex; Future    9. Immunity status testing  Screen  - Hepatitis C Antibody;  Future  Td today    Remind him to consider flu and COVID vaccines in the fall      30 Total Minutes spent pre charting (reviewing problem list, meds, any test results, consultant and hospital notes ) and  obtaining present visit history, performing appropriate medical exam/evaluation, counseling and educating the patient (and family), ordering medications ,tests, and procedures as needed, refilling medication(s), placing referral(s) when needed in addition to coordinating care for this patient and documenting in electronic health record

## 2023-07-20 LAB
ALBUMIN SERPL-MCNC: 4.5 G/DL (ref 3.4–5)
ALBUMIN/GLOB SERPL: 1.4 {RATIO} (ref 1.1–2.2)
ALP SERPL-CCNC: 77 U/L (ref 40–129)
ALT SERPL-CCNC: 19 U/L (ref 10–40)
ANA SER QL IA: NEGATIVE
ANION GAP SERPL CALCULATED.3IONS-SCNC: 9 MMOL/L (ref 3–16)
AST SERPL-CCNC: 20 U/L (ref 15–37)
BILIRUB SERPL-MCNC: 0.8 MG/DL (ref 0–1)
BUN SERPL-MCNC: 19 MG/DL (ref 7–20)
CALCIUM SERPL-MCNC: 9.7 MG/DL (ref 8.3–10.6)
CHLORIDE SERPL-SCNC: 100 MMOL/L (ref 99–110)
CHOLEST SERPL-MCNC: 121 MG/DL (ref 0–199)
CO2 SERPL-SCNC: 28 MMOL/L (ref 21–32)
CREAT SERPL-MCNC: 1.8 MG/DL (ref 0.9–1.3)
CRP SERPL-MCNC: <3 MG/L (ref 0–5.1)
EST. AVERAGE GLUCOSE BLD GHB EST-MCNC: 111.2 MG/DL
GFR SERPLBLD CREATININE-BSD FMLA CKD-EPI: 43 ML/MIN/{1.73_M2}
GLUCOSE SERPL-MCNC: 85 MG/DL (ref 70–99)
HBA1C MFR BLD: 5.5 %
HCV AB SERPL QL IA: NORMAL
HDLC SERPL-MCNC: 46 MG/DL (ref 40–60)
LDLC SERPL CALC-MCNC: 59 MG/DL
POTASSIUM SERPL-SCNC: 4 MMOL/L (ref 3.5–5.1)
PROT SERPL-MCNC: 7.7 G/DL (ref 6.4–8.2)
PSA SERPL DL<=0.01 NG/ML-MCNC: <0.01 NG/ML (ref 0–4)
RHEUMATOID FACT SER IA-ACNC: <10 IU/ML
SODIUM SERPL-SCNC: 137 MMOL/L (ref 136–145)
TRIGL SERPL-MCNC: 82 MG/DL (ref 0–150)
TSH SERPL DL<=0.005 MIU/L-ACNC: 1.37 UIU/ML (ref 0.27–4.2)
URATE SERPL-MCNC: 8.1 MG/DL (ref 3.5–7.2)
VLDLC SERPL CALC-MCNC: 16 MG/DL

## 2023-07-21 RX ORDER — METHYLPREDNISOLONE 4 MG/1
TABLET ORAL
Qty: 1 KIT | Refills: 0 | Status: SHIPPED | OUTPATIENT
Start: 2023-07-21 | End: 2023-07-27

## 2023-07-28 ENCOUNTER — OFFICE VISIT (OUTPATIENT)
Dept: FAMILY MEDICINE CLINIC | Age: 58
End: 2023-07-28
Payer: COMMERCIAL

## 2023-07-28 ENCOUNTER — TELEPHONE (OUTPATIENT)
Dept: FAMILY MEDICINE CLINIC | Age: 58
End: 2023-07-28

## 2023-07-28 VITALS
HEART RATE: 68 BPM | WEIGHT: 294.6 LBS | BODY MASS INDEX: 43.63 KG/M2 | DIASTOLIC BLOOD PRESSURE: 67 MMHG | RESPIRATION RATE: 16 BRPM | OXYGEN SATURATION: 98 % | HEIGHT: 69 IN | SYSTOLIC BLOOD PRESSURE: 104 MMHG

## 2023-07-28 DIAGNOSIS — E79.0 ELEVATED URIC ACID IN BLOOD: ICD-10-CM

## 2023-07-28 DIAGNOSIS — N18.31 CHRONIC KIDNEY DISEASE, STAGE 3A (HCC): Primary | ICD-10-CM

## 2023-07-28 DIAGNOSIS — N18.31 CHRONIC KIDNEY DISEASE, STAGE 3A (HCC): ICD-10-CM

## 2023-07-28 PROCEDURE — 3074F SYST BP LT 130 MM HG: CPT | Performed by: FAMILY MEDICINE

## 2023-07-28 PROCEDURE — 99213 OFFICE O/P EST LOW 20 MIN: CPT | Performed by: FAMILY MEDICINE

## 2023-07-28 PROCEDURE — 3078F DIAST BP <80 MM HG: CPT | Performed by: FAMILY MEDICINE

## 2023-07-28 NOTE — TELEPHONE ENCOUNTER
Patient called and said that he forgot to ask renaat if it was okay for him to do very strenuous exercise/activity. Would like a call back.

## 2023-07-28 NOTE — PROGRESS NOTES
Laura Urbina is a 65 y.o. male. HPI:  Here for BP check and labs review   Feeling better   BP good today   Labs reviewed with Pt     Meds, vitamins and allergies reviewed with pt    Wt Readings from Last 3 Encounters:   07/28/23 294 lb 9.6 oz (133.6 kg)   07/19/23 295 lb 9.6 oz (134.1 kg)   05/10/22 (!) 306 lb (138.8 kg)       REVIEW OF SYSTEMS:   CONSTITUTIONAL: See history of present illness,   Weight noted   HEENT: No new vision difficulties or ringing in the ears. RESPIRATORY: No new SOB, PND, orthopnea or cough. CARDIOVASCULAR: no CP, palpitations or SOB with exertion  GI: No nausea, vomiting, diarrhea, constipation, abdominal pain or changes in bowel habits. : No urinary frequency, urgency, incontinence hematuria or dysuria. SKIN: No cyanosis or skin lesions. MUSCULOSKELETAL: No new muscle or joint pain. NEUROLOGICAL: No syncope or TIA-like symptoms. PSYCHIATRIC: No anxiety, insomnia or depression     Allergies   Allergen Reactions    Aleve [Naproxen Sodium] Rash     \"pt takes aspirin at home\"    Norvasc [Amlodipine Besylate] Other (See Comments)     Palpitations/chest pressure       Prior to Visit Medications    Medication Sig Taking?  Authorizing Provider   allopurinol (ZYLOPRIM) 100 MG tablet Take 2 tablets by mouth daily Yes Danielle Priest MD   pantoprazole (PROTONIX) 40 MG tablet Take 1 tablet by mouth every morning (before breakfast) Yes Danielle Priest MD   olmesartan-hydroCHLOROthiazide (BENICAR HCT) 40-12.5 MG per tablet Take 1 tablet by mouth daily Yes Danielle Priest MD   aspirin (ASPIRIN LOW DOSE) 81 MG chewable tablet CHEW ONE TABLET BY MOUTH DAILY Yes Danielle Priest MD   atorvastatin (LIPITOR) 10 MG tablet Take 1 tablet by mouth nightly Yes Danielle Priest MD   mometasone (ASMANEX, 30 METERED DOSES,) 110 MCG/INH AEPB Inhale 2 puffs into the lungs 2 times daily Yes Danielle Priest MD   loratadine (CLARITIN) 10 MG tablet Take 1 tablet by mouth daily Yes Danielle Priest MD

## 2023-07-29 LAB
ALBUMIN SERPL-MCNC: 4.2 G/DL (ref 3.4–5)
ALBUMIN/GLOB SERPL: 1.6 {RATIO} (ref 1.1–2.2)
ALP SERPL-CCNC: 52 U/L (ref 40–129)
ALT SERPL-CCNC: 20 U/L (ref 10–40)
ANION GAP SERPL CALCULATED.3IONS-SCNC: 16 MMOL/L (ref 3–16)
AST SERPL-CCNC: 27 U/L (ref 15–37)
BILIRUB SERPL-MCNC: 1 MG/DL (ref 0–1)
BUN SERPL-MCNC: 29 MG/DL (ref 7–20)
CALCIUM SERPL-MCNC: 9.5 MG/DL (ref 8.3–10.6)
CHLORIDE SERPL-SCNC: 96 MMOL/L (ref 99–110)
CO2 SERPL-SCNC: 22 MMOL/L (ref 21–32)
CREAT SERPL-MCNC: 2.1 MG/DL (ref 0.9–1.3)
GFR SERPLBLD CREATININE-BSD FMLA CKD-EPI: 36 ML/MIN/{1.73_M2}
GLUCOSE SERPL-MCNC: 76 MG/DL (ref 70–99)
POTASSIUM SERPL-SCNC: 3.8 MMOL/L (ref 3.5–5.1)
PROT SERPL-MCNC: 6.9 G/DL (ref 6.4–8.2)
SODIUM SERPL-SCNC: 134 MMOL/L (ref 136–145)
URATE SERPL-MCNC: 7.5 MG/DL (ref 3.5–7.2)

## 2023-08-21 ENCOUNTER — TELEPHONE (OUTPATIENT)
Dept: FAMILY MEDICINE CLINIC | Age: 58
End: 2023-08-21

## 2023-08-21 NOTE — TELEPHONE ENCOUNTER
----- Message from Ashwini Chung sent at 8/21/2023 11:48 AM EDT -----  Subject: Message to Provider    QUESTIONS  Information for Provider? patient called stating that he needs to speak to   Dr. Nhi Leigh in regards to existing low back pain/discomfort in kidney area. Patient did not want to make appt, states he has discussed this with Dr. Nhi Leigh on a few occasions, patient wants to know if a referral to a kidney   specialist should be made  ---------------------------------------------------------------------------  --------------  Tyler Marine Madhuri  [phone]; OK to leave message on voicemail  ---------------------------------------------------------------------------  --------------  SCRIPT ANSWERS  Relationship to Patient?  Self

## 2023-08-22 DIAGNOSIS — R79.89 ELEVATED SERUM CREATININE: Primary | ICD-10-CM

## 2023-08-22 NOTE — TELEPHONE ENCOUNTER
Patient called and he lives in [facility] area for the referral he said    He will also be in to do the lab Wednesday or Thursday he said

## 2023-08-25 DIAGNOSIS — R79.89 ELEVATED SERUM CREATININE: ICD-10-CM

## 2023-08-25 LAB
ALBUMIN SERPL-MCNC: 4.3 G/DL (ref 3.4–5)
ALBUMIN/GLOB SERPL: 2 {RATIO} (ref 1.1–2.2)
ALP SERPL-CCNC: 62 U/L (ref 40–129)
ALT SERPL-CCNC: 18 U/L (ref 10–40)
ANION GAP SERPL CALCULATED.3IONS-SCNC: 11 MMOL/L (ref 3–16)
AST SERPL-CCNC: 18 U/L (ref 15–37)
BILIRUB SERPL-MCNC: 0.6 MG/DL (ref 0–1)
BUN SERPL-MCNC: 16 MG/DL (ref 7–20)
CALCIUM SERPL-MCNC: 9.2 MG/DL (ref 8.3–10.6)
CHLORIDE SERPL-SCNC: 101 MMOL/L (ref 99–110)
CO2 SERPL-SCNC: 27 MMOL/L (ref 21–32)
CREAT SERPL-MCNC: 2 MG/DL (ref 0.9–1.3)
GFR SERPLBLD CREATININE-BSD FMLA CKD-EPI: 38 ML/MIN/{1.73_M2}
GLUCOSE SERPL-MCNC: 88 MG/DL (ref 70–99)
POTASSIUM SERPL-SCNC: 4.2 MMOL/L (ref 3.5–5.1)
PROT SERPL-MCNC: 6.5 G/DL (ref 6.4–8.2)
SODIUM SERPL-SCNC: 139 MMOL/L (ref 136–145)

## 2023-09-12 ENCOUNTER — TELEPHONE (OUTPATIENT)
Dept: FAMILY MEDICINE CLINIC | Age: 58
End: 2023-09-12

## 2023-09-12 NOTE — TELEPHONE ENCOUNTER
Patient called and saw Dr Jose Gastelum yesterday and had patient discontinue his bp medication and start     olmesartan (BENICAR) 20 MG tablet [[id number]]    Which will be easier on his kidneys he said. The Dr also told patient he has stage 3 kidney disease     Patient is asking if you agree with Dr Jose Gastelum ?     Patient would like a call back  as he isn't on my chart

## 2023-10-20 ENCOUNTER — HOSPITAL ENCOUNTER (OUTPATIENT)
Dept: ULTRASOUND IMAGING | Age: 58
Discharge: HOME | End: 2023-10-20
Attending: INTERNAL MEDICINE
Payer: COMMERCIAL

## 2023-10-20 ENCOUNTER — HOSPITAL ENCOUNTER (OUTPATIENT)
Age: 58
Discharge: HOME | End: 2023-10-20
Attending: INTERNAL MEDICINE
Payer: COMMERCIAL

## 2023-10-20 DIAGNOSIS — I12.9 BENIGN HYPERTENSION WITH STAGE 3B CHRONIC KIDNEY DISEASE (HCC): ICD-10-CM

## 2023-10-20 DIAGNOSIS — N18.32 BENIGN HYPERTENSION WITH STAGE 3B CHRONIC KIDNEY DISEASE (HCC): ICD-10-CM

## 2023-10-20 DIAGNOSIS — E87.1 HYPONATREMIA: ICD-10-CM

## 2023-10-20 DIAGNOSIS — M1A.9XX0 CHRONIC GOUT WITHOUT TOPHUS, UNSPECIFIED CAUSE, UNSPECIFIED SITE: ICD-10-CM

## 2023-10-20 DIAGNOSIS — N18.32 CHRONIC KIDNEY DISEASE, STAGE 3B (HCC): ICD-10-CM

## 2023-10-20 DIAGNOSIS — I10 HYPERTENSION, UNSPECIFIED TYPE: ICD-10-CM

## 2023-10-20 LAB
25(OH)D3 SERPL-MCNC: 11.4 NG/ML
ALBUMIN SERPL-MCNC: 4 G/DL (ref 3.4–5)
ANION GAP SERPL CALCULATED.3IONS-SCNC: 8 MMOL/L (ref 3–16)
BACTERIA URNS QL MICRO: NORMAL /HPF
BILIRUB UR QL STRIP.AUTO: NEGATIVE
BUN SERPL-MCNC: 10 MG/DL (ref 7–20)
C3 SERPL-MCNC: 124.2 MG/DL (ref 90–180)
C4 SERPL-MCNC: 32.4 MG/DL (ref 10–40)
CALCIUM SERPL-MCNC: 8.8 MG/DL (ref 8.3–10.6)
CHLORIDE SERPL-SCNC: 101 MMOL/L (ref 99–110)
CLARITY UR: CLEAR
CO2 SERPL-SCNC: 28 MMOL/L (ref 21–32)
COLOR UR: YELLOW
CREAT SERPL-MCNC: 1.6 MG/DL (ref 0.9–1.3)
CREAT UR-MCNC: 75.7 MG/DL (ref 39–259)
DEPRECATED RDW RBC AUTO: 13.5 % (ref 12.4–15.4)
EPI CELLS #/AREA URNS AUTO: 0 /HPF (ref 0–5)
GFR SERPLBLD CREATININE-BSD FMLA CKD-EPI: 50 ML/MIN/{1.73_M2}
GLUCOSE SERPL-MCNC: 75 MG/DL (ref 70–99)
GLUCOSE UR STRIP.AUTO-MCNC: NEGATIVE MG/DL
HCT VFR BLD AUTO: 40.7 % (ref 40.5–52.5)
HGB BLD-MCNC: 13.7 G/DL (ref 13.5–17.5)
HGB UR QL STRIP.AUTO: NEGATIVE
HYALINE CASTS #/AREA URNS AUTO: 0 /LPF (ref 0–8)
KETONES UR STRIP.AUTO-MCNC: NEGATIVE MG/DL
LEUKOCYTE ESTERASE UR QL STRIP.AUTO: NEGATIVE
MCH RBC QN AUTO: 32.5 PG (ref 26–34)
MCHC RBC AUTO-ENTMCNC: 33.8 G/DL (ref 31–36)
MCV RBC AUTO: 96.2 FL (ref 80–100)
MICROALBUMIN UR DL<=1MG/L-MCNC: <1.2 MG/DL
MICROALBUMIN/CREAT UR: NORMAL MG/G (ref 0–30)
NITRITE UR QL STRIP.AUTO: NEGATIVE
PH UR STRIP.AUTO: 6.5 [PH] (ref 5–8)
PHOSPHATE SERPL-MCNC: 3.6 MG/DL (ref 2.5–4.9)
PLATELET # BLD AUTO: 209 K/UL (ref 135–450)
PMV BLD AUTO: 9.2 FL (ref 5–10.5)
POTASSIUM SERPL-SCNC: 4.7 MMOL/L (ref 3.5–5.1)
PROT UR STRIP.AUTO-MCNC: NEGATIVE MG/DL
PROT UR-MCNC: 6 MG/DL
PROT/CREAT UR-RTO: 0.1 MG/DL
PTH-INTACT SERPL-MCNC: 92.4 PG/ML (ref 14–72)
RBC # BLD AUTO: 4.23 M/UL (ref 4.2–5.9)
RBC CLUMPS #/AREA URNS AUTO: 0 /HPF (ref 0–4)
SODIUM SERPL-SCNC: 137 MMOL/L (ref 136–145)
SP GR UR STRIP.AUTO: 1.01 (ref 1–1.03)
UA DIPSTICK W REFLEX MICRO PNL UR: NORMAL
URATE SERPL-MCNC: 5.5 MG/DL (ref 3.5–7.2)
URN SPEC COLLECT METH UR: NORMAL
UROBILINOGEN UR STRIP-ACNC: 0.2 E.U./DL
WBC # BLD AUTO: 7.9 K/UL (ref 4–11)
WBC #/AREA URNS AUTO: 0 /HPF (ref 0–5)

## 2023-10-20 PROCEDURE — 82306 VITAMIN D 25 HYDROXY: CPT

## 2023-10-20 PROCEDURE — 80069 RENAL FUNCTION PANEL: CPT

## 2023-10-20 PROCEDURE — 82043 UR ALBUMIN QUANTITATIVE: CPT

## 2023-10-20 PROCEDURE — 84156 ASSAY OF PROTEIN URINE: CPT

## 2023-10-20 PROCEDURE — 83970 ASSAY OF PARATHORMONE: CPT

## 2023-10-20 PROCEDURE — 82570 ASSAY OF URINE CREATININE: CPT

## 2023-10-20 PROCEDURE — 81001 URINALYSIS AUTO W/SCOPE: CPT

## 2023-10-20 PROCEDURE — 86160 COMPLEMENT ANTIGEN: CPT

## 2023-10-20 PROCEDURE — 86225 DNA ANTIBODY NATIVE: CPT

## 2023-10-20 PROCEDURE — 76770 US EXAM ABDO BACK WALL COMP: CPT

## 2023-10-20 PROCEDURE — 86038 ANTINUCLEAR ANTIBODIES: CPT

## 2023-10-20 PROCEDURE — 84550 ASSAY OF BLOOD/URIC ACID: CPT

## 2023-10-20 PROCEDURE — 85027 COMPLETE CBC AUTOMATED: CPT

## 2023-10-20 PROCEDURE — 36415 COLL VENOUS BLD VENIPUNCTURE: CPT

## 2023-10-21 LAB
ANA SER QL IA: NEGATIVE
DSDNA AB SER-ACNC: 2 IU/ML (ref 0–9)

## 2023-12-22 NOTE — TELEPHONE ENCOUNTER
[facility] Transitions Initial Follow Up Call    Outreach made within 2 business days of discharge: Yes    Patient: Diana Mart Patient : 1965   MRN: [id number]  Reason for Admission: There are no discharge diagnoses documented for the most recent discharge. Discharge Date: 22       Spoke with: [facility]    Discharge department/facility: [facility] Interactive Patient Contact:  Was patient able to fill all prescriptions: Yes Yes  Was patient instructed to bring all medications to the follow-up visit: Yes  Is patient taking all medications as directed in the discharge summary?  Yes  Does patient understand their discharge instructions: Yes  Does patient have questions or concerns that need addressed prior to 7-14 day follow up office visit: no    Scheduled appointment with PCP within 7-14 days      Cheo Morales MA Pt agitated, very restless on the stretcher. Moaning. Vomiting small amounts at a time.     Up to the bathroom to have a BM.

## 2024-01-07 ENCOUNTER — HOSPITAL ENCOUNTER (EMERGENCY)
Age: 59
Discharge: HOME | End: 2024-01-07
Attending: EMERGENCY MEDICINE
Payer: COMMERCIAL

## 2024-01-07 ENCOUNTER — APPOINTMENT (OUTPATIENT)
Dept: CT IMAGING | Age: 59
End: 2024-01-07
Payer: COMMERCIAL

## 2024-01-07 VITALS
BODY MASS INDEX: 43.56 KG/M2 | SYSTOLIC BLOOD PRESSURE: 156 MMHG | OXYGEN SATURATION: 100 % | TEMPERATURE: 98.3 F | RESPIRATION RATE: 16 BRPM | HEART RATE: 60 BPM | WEIGHT: 295 LBS | DIASTOLIC BLOOD PRESSURE: 110 MMHG

## 2024-01-07 DIAGNOSIS — R03.0 ELEVATED BLOOD PRESSURE READING: Primary | ICD-10-CM

## 2024-01-07 LAB
ALBUMIN SERPL-MCNC: 3.9 G/DL (ref 3.4–5)
ALBUMIN/GLOB SERPL: 1.3 {RATIO} (ref 1.1–2.2)
ALP SERPL-CCNC: 73 U/L (ref 40–129)
ALT SERPL-CCNC: 14 U/L (ref 10–40)
ANION GAP SERPL CALCULATED.3IONS-SCNC: 8 MMOL/L (ref 3–16)
AST SERPL-CCNC: 16 U/L (ref 15–37)
BASOPHILS # BLD: 0 K/UL (ref 0–0.2)
BASOPHILS NFR BLD: 0.7 %
BILIRUB SERPL-MCNC: 0.7 MG/DL (ref 0–1)
BUN SERPL-MCNC: 11 MG/DL (ref 7–20)
CALCIUM SERPL-MCNC: 8.7 MG/DL (ref 8.3–10.6)
CHLORIDE SERPL-SCNC: 104 MMOL/L (ref 99–110)
CO2 SERPL-SCNC: 24 MMOL/L (ref 21–32)
CREAT SERPL-MCNC: 1.3 MG/DL (ref 0.9–1.3)
DEPRECATED RDW RBC AUTO: 13.6 % (ref 12.4–15.4)
EKG ATRIAL RATE: 56 BPM
EKG DIAGNOSIS: NORMAL
EKG P AXIS: 37 DEGREES
EKG P-R INTERVAL: 190 MS
EKG Q-T INTERVAL: 380 MS
EKG QRS DURATION: 84 MS
EKG QTC CALCULATION (BAZETT): 366 MS
EKG R AXIS: 31 DEGREES
EKG T AXIS: 18 DEGREES
EKG VENTRICULAR RATE: 56 BPM
EOSINOPHIL # BLD: 0.1 K/UL (ref 0–0.6)
EOSINOPHIL NFR BLD: 3 %
GFR SERPLBLD CREATININE-BSD FMLA CKD-EPI: >60 ML/MIN/{1.73_M2}
GLUCOSE SERPL-MCNC: 94 MG/DL (ref 70–99)
HCT VFR BLD AUTO: 43 % (ref 40.5–52.5)
HGB BLD-MCNC: 14.6 G/DL (ref 13.5–17.5)
LYMPHOCYTES # BLD: 2.1 K/UL (ref 1–5.1)
LYMPHOCYTES NFR BLD: 42.9 %
MCH RBC QN AUTO: 32.1 PG (ref 26–34)
MCHC RBC AUTO-ENTMCNC: 33.9 G/DL (ref 31–36)
MCV RBC AUTO: 94.8 FL (ref 80–100)
MONOCYTES # BLD: 0.4 K/UL (ref 0–1.3)
MONOCYTES NFR BLD: 8.5 %
NEUTROPHILS # BLD: 2.2 K/UL (ref 1.7–7.7)
NEUTROPHILS NFR BLD: 44.9 %
PLATELET # BLD AUTO: 176 K/UL (ref 135–450)
PMV BLD AUTO: 8.3 FL (ref 5–10.5)
POTASSIUM SERPL-SCNC: 3.9 MMOL/L (ref 3.5–5.1)
PROT SERPL-MCNC: 6.9 G/DL (ref 6.4–8.2)
RBC # BLD AUTO: 4.54 M/UL (ref 4.2–5.9)
SODIUM SERPL-SCNC: 136 MMOL/L (ref 136–145)
TROPONIN, HIGH SENSITIVITY: 6 NG/L (ref 0–22)
TROPONIN, HIGH SENSITIVITY: 8 NG/L (ref 0–22)
WBC # BLD AUTO: 4.8 K/UL (ref 4–11)

## 2024-01-07 PROCEDURE — 84484 ASSAY OF TROPONIN QUANT: CPT

## 2024-01-07 PROCEDURE — 70450 CT HEAD/BRAIN W/O DYE: CPT

## 2024-01-07 PROCEDURE — 80053 COMPREHEN METABOLIC PANEL: CPT

## 2024-01-07 PROCEDURE — 85025 COMPLETE CBC W/AUTO DIFF WBC: CPT

## 2024-01-07 PROCEDURE — 6370000000 HC RX 637 (ALT 250 FOR IP): Performed by: EMERGENCY MEDICINE

## 2024-01-07 PROCEDURE — 99284 EMERGENCY DEPT VISIT MOD MDM: CPT

## 2024-01-07 PROCEDURE — 93010 ELECTROCARDIOGRAM REPORT: CPT | Performed by: INTERNAL MEDICINE

## 2024-01-07 PROCEDURE — 93005 ELECTROCARDIOGRAM TRACING: CPT | Performed by: EMERGENCY MEDICINE

## 2024-01-07 PROCEDURE — 36415 COLL VENOUS BLD VENIPUNCTURE: CPT

## 2024-01-07 RX ORDER — HYDROCHLOROTHIAZIDE 25 MG/1
25 TABLET ORAL ONCE
Status: COMPLETED | OUTPATIENT
Start: 2024-01-07 | End: 2024-01-07

## 2024-01-07 RX ORDER — ACETAMINOPHEN 500 MG
1000 TABLET ORAL ONCE
Status: COMPLETED | OUTPATIENT
Start: 2024-01-07 | End: 2024-01-07

## 2024-01-07 RX ADMIN — HYDROCHLOROTHIAZIDE 25 MG: 25 TABLET ORAL at 11:29

## 2024-01-07 RX ADMIN — ACETAMINOPHEN 1000 MG: 500 TABLET ORAL at 07:31

## 2024-01-07 ASSESSMENT — PAIN SCALES - GENERAL
PAINLEVEL_OUTOF10: 7
PAINLEVEL_OUTOF10: 3

## 2024-01-07 ASSESSMENT — PAIN DESCRIPTION - LOCATION: LOCATION: HEAD

## 2024-01-07 ASSESSMENT — PAIN - FUNCTIONAL ASSESSMENT: PAIN_FUNCTIONAL_ASSESSMENT: 0-10

## 2024-01-07 NOTE — ED TRIAGE NOTES
Pt reports high blood pressure this morning accompanied by HA. Denies any changes with vision. Reports taking his prescribed medications about 0530.

## 2024-01-07 NOTE — ED PROVIDER NOTES
temperature 98.3 °F (36.8 °C), temperature source Oral, resp. rate 16, weight 133.8 kg (295 lb), SpO2 100 %.  Constitutional:  58 y.o. male alert, nontoxic  HENT:  Atraumatic, mucous membranes moist  Eyes:   Conjunctiva clear, no icterus  Neck:  Supple, no adenopathy, no visible JVD  Cardiovascular:  Regular, no rubs  Thorax & Lungs:  No accessory muscle usage, clear  Abdomen:  Soft, non distended, bowel sounds present, no tenderness  Back:  No deformity  Genitalia:  Deferred  Rectal:  Deferred  Extremities:  No cyanosis, no edema  Skin:  Warm, dry  Neurologic:  Alert & oriented, no slurred speech, CN function intact, PERRL, no nystagmus, coordination normal, no focal deficits, NIHSS 0  Psychiatric:  Affect appropriate    RESULTS / MEDICAL DECISION MAKING:  Labs resulted at the time of this note reviewed.  Labs Reviewed   CBC WITH AUTO DIFFERENTIAL   COMPREHENSIVE METABOLIC PANEL W/ REFLEX TO MG FOR LOW K   TROPONIN   TROPONIN     EKG:  Read by me in the absence of a cardiologist shows: Sinus bradycardia, rate 56, QRS duration normal, axis 31 degrees, no acute injury pattern, no major change when compared to prior    RADIOLOGY:    CT HEAD WO CONTRAST   Final Result   No acute intracranial abnormality.           ED COURSE:    Medications   acetaminophen (TYLENOL) tablet 1,000 mg (1,000 mg Oral Given 1/7/24 0731)   hydroCHLOROthiazide (HYDRODIURIL) tablet 25 mg (25 mg Oral Given 1/7/24 1129)     Vitals:    01/07/24 0645 01/07/24 0936 01/07/24 1113   BP: (!) 160/111 (!) 146/86 (!) 156/110   Pulse: 64 61 60   Resp: 16 16    Temp: 98.3 °F (36.8 °C)     TempSrc: Oral     SpO2: 100% 99% 100%   Weight: 133.8 kg (295 lb)       History from:  Patient  Limitations to history:  None  Chronic Conditions:  has a past medical history of Arthritis, Bilateral renal cysts, DJD (degenerative joint disease), Elevated uric acid in blood, GERD (gastroesophageal reflux disease), Gout, HTN (hypertension), Hypertension, Movement disorder,

## 2024-02-28 ENCOUNTER — TELEPHONE (OUTPATIENT)
Dept: FAMILY MEDICINE CLINIC | Age: 59
End: 2024-02-28

## 2024-02-28 RX ORDER — FLUTICASONE PROPIONATE 50 MCG
2 SPRAY, SUSPENSION (ML) NASAL DAILY
Qty: 16 G | Refills: 2 | Status: SHIPPED | OUTPATIENT
Start: 2024-02-28

## 2024-02-28 RX ORDER — ASPIRIN 81 MG/1
TABLET, CHEWABLE ORAL
Qty: 90 TABLET | Refills: 3 | Status: SHIPPED | OUTPATIENT
Start: 2024-02-28

## 2024-02-28 NOTE — TELEPHONE ENCOUNTER
Patient wanted to know if he can take any allergy medicine with his kidney having new problems. If he can take allergy medicine, he will need a new prescription called in to below pharmacy    [facility] [address] [facility]

## 2024-02-28 NOTE — TELEPHONE ENCOUNTER
Medication:   Requested Prescriptions     Pending Prescriptions Disp Refills    aspirin 81 MG chewable tablet [Pharmacy Med Name: ASPIRIN 81MG CHEWABLE TABLETS] 90 tablet 3     Sig: CHEW AND SWALLOW 1 TABLET BY MOUTH ONCE DAILY        Last Filled:      Patient Phone Number: [phone] (home)     Last appt: 7/28/2023   Next appt: Visit date not found    Last OARRS:        No data to display

## 2024-02-29 RX ORDER — FLUTICASONE PROPIONATE 50 MCG
2 SPRAY, SUSPENSION (ML) NASAL DAILY
Qty: 48 G | OUTPATIENT
Start: 2024-02-29

## 2024-04-19 ENCOUNTER — OFFICE VISIT (OUTPATIENT)
Dept: FAMILY MEDICINE CLINIC | Age: 59
End: 2024-04-19
Payer: COMMERCIAL

## 2024-04-19 VITALS
DIASTOLIC BLOOD PRESSURE: 85 MMHG | HEIGHT: 73 IN | BODY MASS INDEX: 39.49 KG/M2 | HEART RATE: 63 BPM | SYSTOLIC BLOOD PRESSURE: 122 MMHG | OXYGEN SATURATION: 96 % | WEIGHT: 298 LBS

## 2024-04-19 DIAGNOSIS — R51.9 NONINTRACTABLE EPISODIC HEADACHE, UNSPECIFIED HEADACHE TYPE: ICD-10-CM

## 2024-04-19 DIAGNOSIS — I10 HYPERTENSION, UNSPECIFIED TYPE: Primary | ICD-10-CM

## 2024-04-19 PROCEDURE — 99213 OFFICE O/P EST LOW 20 MIN: CPT | Performed by: FAMILY MEDICINE

## 2024-04-19 PROCEDURE — 3079F DIAST BP 80-89 MM HG: CPT | Performed by: FAMILY MEDICINE

## 2024-04-19 PROCEDURE — 3074F SYST BP LT 130 MM HG: CPT | Performed by: FAMILY MEDICINE

## 2024-04-19 ASSESSMENT — PATIENT HEALTH QUESTIONNAIRE - PHQ9
1. LITTLE INTEREST OR PLEASURE IN DOING THINGS: NOT AT ALL
9. THOUGHTS THAT YOU WOULD BE BETTER OFF DEAD, OR OF HURTING YOURSELF: NOT AT ALL
10. IF YOU CHECKED OFF ANY PROBLEMS, HOW DIFFICULT HAVE THESE PROBLEMS MADE IT FOR YOU TO DO YOUR WORK, TAKE CARE OF THINGS AT HOME, OR GET ALONG WITH OTHER PEOPLE: NOT DIFFICULT AT ALL
SUM OF ALL RESPONSES TO PHQ QUESTIONS 1-9: 0
4. FEELING TIRED OR HAVING LITTLE ENERGY: NOT AT ALL
SUM OF ALL RESPONSES TO PHQ QUESTIONS 1-9: 0
SUM OF ALL RESPONSES TO PHQ QUESTIONS 1-9: 0
8. MOVING OR SPEAKING SO SLOWLY THAT OTHER PEOPLE COULD HAVE NOTICED. OR THE OPPOSITE, BEING SO FIGETY OR RESTLESS THAT YOU HAVE BEEN MOVING AROUND A LOT MORE THAN USUAL: NOT AT ALL
5. POOR APPETITE OR OVEREATING: NOT AT ALL
6. FEELING BAD ABOUT YOURSELF - OR THAT YOU ARE A FAILURE OR HAVE LET YOURSELF OR YOUR FAMILY DOWN: NOT AT ALL
3. TROUBLE FALLING OR STAYING ASLEEP: NOT AT ALL
SUM OF ALL RESPONSES TO PHQ9 QUESTIONS 1 & 2: 0
SUM OF ALL RESPONSES TO PHQ QUESTIONS 1-9: 0
2. FEELING DOWN, DEPRESSED OR HOPELESS: NOT AT ALL
7. TROUBLE CONCENTRATING ON THINGS, SUCH AS READING THE NEWSPAPER OR WATCHING TELEVISION: NOT AT ALL

## 2024-04-19 NOTE — PROGRESS NOTES
[facility] Medicine  Clinic Note    Date: 4/19/2024                                               /Objective:     Chief Complaint   Patient presents with    Blood Pressure Check       HPI  Patient is here for follow-up on hypertension.  Currently takes olmesartan.  Denies chest pain or shortness of breath. Reports that his BP has been high at home, 174 this morning. +HAs intermittently throughout the last week, none now.  No speech difficulty or weakness or numbness.         Patient Active Problem List    Diagnosis Date Noted    Chest pain 01/22/2015    Hypertension, essential 05/22/2013    PTSD (post-traumatic stress disorder) 03/01/2022    Chronic kidney disease, stage 3a ([facility])     Chronic gout without tophus     H/O TIA (transient ischemic attack) and stroke     Major depressive disorder, recurrent severe without psychotic features (HCC) 02/28/2022    Major depressive disorder, single episode 02/28/2022    Adjustment disorder with mixed anxiety and depressed mood 02/21/2022    Adjustment insomnia 02/21/2022    Mixed hyperlipidemia 08/25/2017    Prostate cancer (HCC) 02/24/2016    Strain of Achilles tendon 06/16/2015    Cough, persistent 08/27/2014    GE reflux 08/26/2014    Bilateral renal cysts 06/03/2013    Chest pain, atypical 06/03/2013    Renal insufficiency 06/01/2013    Elevated uric acid in blood 05/22/2013    Obesity 02/06/2013    DJD (degenerative joint disease) 08/23/2011       Past Medical History:   Diagnosis Date    Arthritis     Bilateral renal cysts 6/3/2013    US   6/3/13     DJD (degenerative joint disease) 8/23/2011    Elevated uric acid in blood 5/22/2013    GERD (gastroesophageal reflux disease)     Gout 2013    HTN (hypertension) 5/22/2013    Hypertension     Movement disorder     gout; left knee along w/generallized arthritis    Prostate cancer (HCC) 2/24/16    Renal insufficiency 6/1/2013    TIA (transient ischemic attack)        Current Outpatient Medications   Medication Sig

## 2024-04-29 ENCOUNTER — OFFICE VISIT (OUTPATIENT)
Dept: FAMILY MEDICINE CLINIC | Age: 59
End: 2024-04-29
Payer: COMMERCIAL

## 2024-04-29 VITALS
DIASTOLIC BLOOD PRESSURE: 84 MMHG | BODY MASS INDEX: 38.79 KG/M2 | HEART RATE: 76 BPM | OXYGEN SATURATION: 96 % | WEIGHT: 294 LBS | SYSTOLIC BLOOD PRESSURE: 122 MMHG

## 2024-04-29 DIAGNOSIS — K21.9 GASTROESOPHAGEAL REFLUX DISEASE, UNSPECIFIED WHETHER ESOPHAGITIS PRESENT: ICD-10-CM

## 2024-04-29 DIAGNOSIS — C61 PROSTATE CANCER (HCC): ICD-10-CM

## 2024-04-29 DIAGNOSIS — E78.2 MIXED HYPERLIPIDEMIA: ICD-10-CM

## 2024-04-29 DIAGNOSIS — N18.31 CHRONIC KIDNEY DISEASE, STAGE 3A (HCC): ICD-10-CM

## 2024-04-29 DIAGNOSIS — I10 HYPERTENSION, ESSENTIAL: Primary | ICD-10-CM

## 2024-04-29 PROCEDURE — 99214 OFFICE O/P EST MOD 30 MIN: CPT | Performed by: FAMILY MEDICINE

## 2024-04-29 PROCEDURE — 3079F DIAST BP 80-89 MM HG: CPT | Performed by: FAMILY MEDICINE

## 2024-04-29 PROCEDURE — 3074F SYST BP LT 130 MM HG: CPT | Performed by: FAMILY MEDICINE

## 2024-04-29 RX ORDER — LORATADINE 10 MG/1
10 TABLET ORAL DAILY
Qty: 90 TABLET | Refills: 1 | Status: SHIPPED | OUTPATIENT
Start: 2024-04-29

## 2024-04-29 RX ORDER — OLMESARTAN MEDOXOMIL 20 MG/1
20 TABLET ORAL DAILY
Qty: 90 TABLET | Refills: 3 | Status: SHIPPED | OUTPATIENT
Start: 2024-04-29

## 2024-04-29 NOTE — PROGRESS NOTES
Keyon Rosales is a 58 y.o. male.    HPI:  Multiple medical issues here for checkup including blood work  History of hypertension, elevated creatinine, elevated uric acid  Has been treated for prostate cancer in the past    He has seen nephrology in the past Dr. Newman in October 2023    Labs from November 2023 reviewed with patient    Due for fasting labs in the future, orders in epic    Meds, vitamins and allergies reviewed with pt    Wt Readings from Last 3 Encounters:   04/29/24 133.4 kg (294 lb)   04/19/24 135.2 kg (298 lb)   01/07/24 133.8 kg (295 lb)       REVIEW OF SYSTEMS:   CONSTITUTIONAL: See history of present illness,   Weight noted /stable   HEENT: No new vision difficulties or ringing in the ears.   RESPIRATORY: No new SOB, PND, orthopnea or cough.   CARDIOVASCULAR: no CP, palpitations or SOB with exertion  GI: No nausea, vomiting, diarrhea, constipation, abdominal pain or changes in bowel habits.   : No urinary frequency, urgency, incontinence hematuria or dysuria.   SKIN: No cyanosis or skin lesions.   MUSCULOSKELETAL: No new muscle or joint pain.   NEUROLOGICAL: No syncope or TIA-like symptoms.   PSYCHIATRIC: No anxiety, insomnia or depression , stable off medication     Allergies   Allergen Reactions    Aleve [Naproxen Sodium] Rash     \"pt takes aspirin at home\"    Norvasc [Amlodipine Besylate] Other (See Comments)     Palpitations/chest pressure       Prior to Visit Medications    Medication Sig Taking? Authorizing Provider   mometasone (ASMANEX) 220 MCG/ACT AEPB inhaler Inhale 2 puffs into the lungs daily Yes Rosa Maria Eduardo MD   loratadine (CLARITIN) 10 MG tablet Take 1 tablet by mouth daily Yes Rosa Maria Eduardo MD   olmesartan (BENICAR) 20 MG tablet Take 1 tablet by mouth daily Yes Rosa Maria Eduardo MD   aspirin 81 MG chewable tablet CHEW AND SWALLOW 1 TABLET BY MOUTH ONCE DAILY Yes Rosa Maria Eduardo MD   fluticasone (FLONASE) 50 MCG/ACT nasal spray 2 sprays by Each Nostril route daily Yes

## 2024-04-30 LAB
25(OH)D3 SERPL-MCNC: 38.8 NG/ML
ALBUMIN SERPL-MCNC: 4.4 G/DL (ref 3.4–5)
ALBUMIN/GLOB SERPL: 1.6 {RATIO} (ref 1.1–2.2)
ALP SERPL-CCNC: 69 U/L (ref 40–129)
ALT SERPL-CCNC: 14 U/L (ref 10–40)
ANION GAP SERPL CALCULATED.3IONS-SCNC: 14 MMOL/L (ref 3–16)
AST SERPL-CCNC: 18 U/L (ref 15–37)
BASOPHILS # BLD: 0 K/UL (ref 0–0.2)
BASOPHILS NFR BLD: 0.2 %
BILIRUB SERPL-MCNC: 1 MG/DL (ref 0–1)
BUN SERPL-MCNC: 11 MG/DL (ref 7–20)
CALCIUM SERPL-MCNC: 9.4 MG/DL (ref 8.3–10.6)
CHLORIDE SERPL-SCNC: 102 MMOL/L (ref 99–110)
CHOLEST SERPL-MCNC: 112 MG/DL (ref 0–199)
CO2 SERPL-SCNC: 24 MMOL/L (ref 21–32)
CREAT SERPL-MCNC: 1.5 MG/DL (ref 0.9–1.3)
DEPRECATED RDW RBC AUTO: 13.9 % (ref 12.4–15.4)
EOSINOPHIL # BLD: 0.3 K/UL (ref 0–0.6)
EOSINOPHIL NFR BLD: 3.7 %
EST. AVERAGE GLUCOSE BLD GHB EST-MCNC: 102.5 MG/DL
FOLATE SERPL-MCNC: 8 NG/ML (ref 4.78–24.2)
GFR SERPLBLD CREATININE-BSD FMLA CKD-EPI: 54 ML/MIN/{1.73_M2}
GLUCOSE P FAST SERPL-MCNC: 86 MG/DL (ref 70–99)
HBA1C MFR BLD: 5.2 %
HCT VFR BLD AUTO: 42.8 % (ref 40.5–52.5)
HDLC SERPL-MCNC: 49 MG/DL (ref 40–60)
HGB BLD-MCNC: 14.8 G/DL (ref 13.5–17.5)
LDL CHOLESTEROL CALCULATED: 52 MG/DL
LYMPHOCYTES # BLD: 3.3 K/UL (ref 1–5.1)
LYMPHOCYTES NFR BLD: 49.2 %
MCH RBC QN AUTO: 32.7 PG (ref 26–34)
MCHC RBC AUTO-ENTMCNC: 34.6 G/DL (ref 31–36)
MCV RBC AUTO: 94.6 FL (ref 80–100)
MONOCYTES # BLD: 0.6 K/UL (ref 0–1.3)
MONOCYTES NFR BLD: 8.3 %
NEUTROPHILS # BLD: 2.6 K/UL (ref 1.7–7.7)
NEUTROPHILS NFR BLD: 38.6 %
PLATELET # BLD AUTO: 176 K/UL (ref 135–450)
PMV BLD AUTO: 8.9 FL (ref 5–10.5)
POTASSIUM SERPL-SCNC: 4.2 MMOL/L (ref 3.5–5.1)
PROT SERPL-MCNC: 7.1 G/DL (ref 6.4–8.2)
PSA SERPL DL<=0.01 NG/ML-MCNC: <0.01 NG/ML (ref 0–4)
PTH-INTACT SERPL-MCNC: 67.3 PG/ML (ref 14–72)
RBC # BLD AUTO: 4.53 M/UL (ref 4.2–5.9)
SODIUM SERPL-SCNC: 140 MMOL/L (ref 136–145)
TRIGL SERPL-MCNC: 57 MG/DL (ref 0–150)
TSH SERPL DL<=0.005 MIU/L-ACNC: 2.29 UIU/ML (ref 0.27–4.2)
URATE SERPL-MCNC: 5.1 MG/DL (ref 3.5–7.2)
VIT B12 SERPL-MCNC: 223 PG/ML (ref 211–911)
VLDLC SERPL CALC-MCNC: 11 MG/DL
WBC # BLD AUTO: 6.7 K/UL (ref 4–11)

## 2024-05-01 RX ORDER — FOLIC ACID 1 MG/1
1 TABLET ORAL DAILY
Qty: 90 TABLET | Refills: 1 | Status: SHIPPED | OUTPATIENT
Start: 2024-05-01

## 2024-05-01 RX ORDER — CHOLECALCIFEROL (VITAMIN D3) 125 MCG
500 CAPSULE ORAL DAILY
Qty: 90 TABLET | Refills: 3 | Status: SHIPPED | OUTPATIENT
Start: 2024-05-01 | End: 2025-05-01

## 2024-05-03 ENCOUNTER — PATIENT MESSAGE (OUTPATIENT)
Dept: FAMILY MEDICINE CLINIC | Age: 59
End: 2024-05-03

## 2024-09-06 DIAGNOSIS — E79.0 ELEVATED URIC ACID IN BLOOD: ICD-10-CM

## 2024-09-06 DIAGNOSIS — E78.2 MIXED HYPERLIPIDEMIA: ICD-10-CM

## 2024-09-06 RX ORDER — ALLOPURINOL 100 MG/1
200 TABLET ORAL DAILY
Qty: 180 TABLET | Refills: 3 | Status: SHIPPED | OUTPATIENT
Start: 2024-09-06

## 2024-09-06 RX ORDER — ATORVASTATIN CALCIUM 10 MG/1
10 TABLET, FILM COATED ORAL NIGHTLY
Qty: 90 TABLET | Refills: 3 | Status: SHIPPED | OUTPATIENT
Start: 2024-09-06

## 2024-09-06 NOTE — TELEPHONE ENCOUNTER
Medication:   Requested Prescriptions     Pending Prescriptions Disp Refills    atorvastatin (LIPITOR) 10 MG tablet [Pharmacy Med Name: ATORVASTATIN 10MG TABLETS] 90 tablet 3     Sig: TAKE 1 TABLET BY MOUTH EVERY NIGHT    allopurinol (ZYLOPRIM) 100 MG tablet [Pharmacy Med Name: ALLOPURINOL 100MG TABLETS] 180 tablet 3     Sig: TAKE 2 TABLETS BY MOUTH DAILY     Last Filled:  07/19/2023    Last appt: 4/29/2024   Next appt: Visit date not found    Last OARRS:        No data to display

## 2024-11-11 RX ORDER — FOLIC ACID 1 MG/1
1 TABLET ORAL DAILY
Qty: 90 TABLET | Refills: 1 | Status: SHIPPED | OUTPATIENT
Start: 2024-11-11

## 2024-11-22 ENCOUNTER — HOSPITAL ENCOUNTER (EMERGENCY)
Age: 59
Discharge: HOME | End: 2024-11-22
Attending: EMERGENCY MEDICINE
Payer: COMMERCIAL

## 2024-11-22 ENCOUNTER — APPOINTMENT (OUTPATIENT)
Dept: GENERAL RADIOLOGY | Age: 59
End: 2024-11-22
Payer: COMMERCIAL

## 2024-11-22 VITALS
SYSTOLIC BLOOD PRESSURE: 126 MMHG | RESPIRATION RATE: 16 BRPM | DIASTOLIC BLOOD PRESSURE: 79 MMHG | OXYGEN SATURATION: 95 % | TEMPERATURE: 97.7 F | HEART RATE: 88 BPM

## 2024-11-22 DIAGNOSIS — R07.9 CHEST PAIN, UNSPECIFIED TYPE: Primary | ICD-10-CM

## 2024-11-22 DIAGNOSIS — R06.00 DYSPNEA, UNSPECIFIED TYPE: ICD-10-CM

## 2024-11-22 LAB
ANION GAP SERPL CALCULATED.3IONS-SCNC: 11 MMOL/L (ref 3–16)
BASOPHILS # BLD: 0.1 K/UL (ref 0–0.2)
BASOPHILS NFR BLD: 0.5 %
BUN SERPL-MCNC: 17 MG/DL (ref 7–20)
CALCIUM SERPL-MCNC: 9.3 MG/DL (ref 8.3–10.6)
CHLORIDE SERPL-SCNC: 100 MMOL/L (ref 99–110)
CO2 SERPL-SCNC: 23 MMOL/L (ref 21–32)
CREAT SERPL-MCNC: 1.6 MG/DL (ref 0.9–1.3)
D-DIMER QUANTITATIVE: 0.33 UG/ML FEU (ref 0–0.6)
DEPRECATED RDW RBC AUTO: 13.3 % (ref 12.4–15.4)
EOSINOPHIL # BLD: 0 K/UL (ref 0–0.6)
EOSINOPHIL NFR BLD: 0.4 %
GFR SERPLBLD CREATININE-BSD FMLA CKD-EPI: 50 ML/MIN/{1.73_M2}
GLUCOSE SERPL-MCNC: 117 MG/DL (ref 70–99)
HCT VFR BLD AUTO: 42 % (ref 40.5–52.5)
HGB BLD-MCNC: 14.5 G/DL (ref 13.5–17.5)
LYMPHOCYTES # BLD: 1.9 K/UL (ref 1–5.1)
LYMPHOCYTES NFR BLD: 16.2 %
MCH RBC QN AUTO: 32.4 PG (ref 26–34)
MCHC RBC AUTO-ENTMCNC: 34.5 G/DL (ref 31–36)
MCV RBC AUTO: 94 FL (ref 80–100)
MONOCYTES # BLD: 0.8 K/UL (ref 0–1.3)
MONOCYTES NFR BLD: 7.2 %
NEUTROPHILS # BLD: 8.7 K/UL (ref 1.7–7.7)
NEUTROPHILS NFR BLD: 75.7 %
PLATELET # BLD AUTO: 217 K/UL (ref 135–450)
PMV BLD AUTO: 8.7 FL (ref 5–10.5)
POTASSIUM SERPL-SCNC: 3.9 MMOL/L (ref 3.5–5.1)
RBC # BLD AUTO: 4.47 M/UL (ref 4.2–5.9)
SODIUM SERPL-SCNC: 134 MMOL/L (ref 136–145)
TROPONIN, HIGH SENSITIVITY: 10 NG/L (ref 0–22)
TROPONIN, HIGH SENSITIVITY: 8 NG/L (ref 0–22)
WBC # BLD AUTO: 11.5 K/UL (ref 4–11)

## 2024-11-22 PROCEDURE — 80048 BASIC METABOLIC PNL TOTAL CA: CPT

## 2024-11-22 PROCEDURE — 71046 X-RAY EXAM CHEST 2 VIEWS: CPT

## 2024-11-22 PROCEDURE — 6360000002 HC RX W HCPCS: Performed by: EMERGENCY MEDICINE

## 2024-11-22 PROCEDURE — 85025 COMPLETE CBC W/AUTO DIFF WBC: CPT

## 2024-11-22 PROCEDURE — 93005 ELECTROCARDIOGRAM TRACING: CPT | Performed by: EMERGENCY MEDICINE

## 2024-11-22 PROCEDURE — 96374 THER/PROPH/DIAG INJ IV PUSH: CPT

## 2024-11-22 PROCEDURE — 85379 FIBRIN DEGRADATION QUANT: CPT

## 2024-11-22 PROCEDURE — 84484 ASSAY OF TROPONIN QUANT: CPT

## 2024-11-22 PROCEDURE — 99285 EMERGENCY DEPT VISIT HI MDM: CPT

## 2024-11-22 PROCEDURE — 6370000000 HC RX 637 (ALT 250 FOR IP): Performed by: EMERGENCY MEDICINE

## 2024-11-22 RX ORDER — KETOROLAC TROMETHAMINE 15 MG/ML
15 INJECTION, SOLUTION INTRAMUSCULAR; INTRAVENOUS ONCE
Status: COMPLETED | OUTPATIENT
Start: 2024-11-22 | End: 2024-11-22

## 2024-11-22 RX ORDER — ASPIRIN 81 MG/1
243 TABLET, CHEWABLE ORAL ONCE
Status: COMPLETED | OUTPATIENT
Start: 2024-11-22 | End: 2024-11-22

## 2024-11-22 RX ADMIN — ASPIRIN 243 MG: 81 TABLET, CHEWABLE ORAL at 21:33

## 2024-11-22 RX ADMIN — KETOROLAC TROMETHAMINE 15 MG: 15 INJECTION, SOLUTION INTRAMUSCULAR; INTRAVENOUS at 21:32

## 2024-11-22 ASSESSMENT — PAIN SCALES - GENERAL
PAINLEVEL_OUTOF10: 4
PAINLEVEL_OUTOF10: 4

## 2024-11-22 ASSESSMENT — PAIN DESCRIPTION - ORIENTATION: ORIENTATION: LEFT;MID

## 2024-11-22 ASSESSMENT — PAIN - FUNCTIONAL ASSESSMENT: PAIN_FUNCTIONAL_ASSESSMENT: 0-10

## 2024-11-22 ASSESSMENT — PAIN DESCRIPTION - LOCATION
LOCATION: CHEST
LOCATION: CHEST

## 2024-11-22 ASSESSMENT — PAIN DESCRIPTION - DESCRIPTORS: DESCRIPTORS: SHARP

## 2024-11-23 NOTE — ED PROVIDER NOTES
changes noted.    RADIOLOGY:   Non-plain film images such as CT, Ultrasound and MRI are read by the radiologist.   Plain radiographic images are visualized and preliminarily interpreted by the ED Provider with the below findings:  -Chest x-ray is clear   Interpretation per Radiologist below, if available at the time of this note:  XR CHEST (2 VW)   Final Result      Lungs are clear           LABS:  Labs Reviewed   CBC WITH AUTO DIFFERENTIAL - Abnormal; Notable for the following components:       Result Value    WBC 11.5 (*)     Neutrophils Absolute 8.7 (*)     All other components within normal limits   BASIC METABOLIC PANEL W/ REFLEX TO MG FOR LOW K - Abnormal; Notable for the following components:    Sodium 134 (*)     Glucose 117 (*)     Creatinine 1.6 (*)     Est, Glom Filt Rate 50 (*)     All other components within normal limits   TROPONIN   TROPONIN   D-DIMER, QUANTITATIVE     When ordered only abnormal lab results are displayed. All other labs were within normal range or not returned as of this dictation.  PROCEDURES   Unless otherwise noted below, none  Procedures  CRITICAL CARE TIME (.cct)   Due to the immediate potential for life-threatening deterioration due to n/a, I personally spent 0 minutes providing critical care on this patient.   EMERGENCY DEPARTMENT COURSE and DIFFERENTIAL DIAGNOSIS/MDM:   CONSULTS: (Who and what was discussed)  None  Discussion with Other Profesionals : None  Social Determinants Significantly Affecting Health : None  Chronic Conditions: see medical history      Patient was given the following medications:  Orders Placed This Encounter   Medications    ketorolac (TORADOL) injection 15 mg    aspirin chewable tablet 243 mg     INITIAL VITALS: BP: 126/79, Temp: 97.7 °F (36.5 °C), Pulse: 88, Respirations: 16, SpO2: 95 %      Is this patient to be included in the SEP-1 Core Measure due to severe sepsis or septic shock?   No   Exclusion criteria - the patient is NOT to be included for  SEP-1 Core Measure due to:  2+ SIRS criteria are not met     CC/HPI Summary, DDx, ED Course, and Reassessment:   Keyon Rosales is a 59 y.o. male who presents to the emergency department secondary to concern for symptoms as noted in HPI above.     On presentation he is awake, alert, oriented.  Vitals are hemodynamically stable and within normal limits.  Physical exam reassuring with clear lungs, benign abdomen, no peripheral edema.  He answers questions appropriately and In complete sentences.  As his symptoms started randomly while he was sitting and occur at random intervals this would be atypical for ACS however he does have some risk factors.  PE is also a possibility though given he has no red flag findings we will order a D-dimer.     He does take baby aspirin every day, this was ordered for completion of full dose in addition to some Toradol. A peripheral IV was placed, labs were ordered in addition to medications and imaging as noted above.    EKG with no acute ischemic changes today, similar morphology to prior.    Labs largely reassuring including a troponin that is 8 and then 10 which is not a significant increase.  Renal panel reassuring including kidney function which appears similar to baseline.  D-dimer is negative.  He does have a mildly elevated white count 11.5 which is not specific, potentially related to illness though difficult to state since symptoms started within the last 5 hours.     Chest x-ray imaging on my read is clear, radiology agrees.    On reassessment We discussed results of his labs and imaging.  At that time he reported no new symptoms.  Repeat vitals remained hemodynamically stable.  We discussed follow-up with primary care and strict return precautions.    After discussion of results, diagnosis, and symptomatic care, I reiterated return precautions and importance of follow-up. He expressed understanding of all instructions, was in agreement with plan, and all questions were

## 2024-11-23 NOTE — DISCHARGE INSTRUCTIONS
Your workup today included blood tests, chest x-ray, and an EKG.  The results showed no signs of heart attack, abnormal heart rhythm, or pneumonia.  Your kidney function is at its baseline.      It is possible you are coming down with an illness that we did not see yet today since symptoms just started.  Please continue to take Tylenol at home and follow-up with your primary care this week.    Return to ED for new or worsening symptoms or concerns.

## 2024-11-24 LAB
EKG ATRIAL RATE: 79 BPM
EKG DIAGNOSIS: NORMAL
EKG P AXIS: 34 DEGREES
EKG P-R INTERVAL: 176 MS
EKG Q-T INTERVAL: 346 MS
EKG QRS DURATION: 88 MS
EKG QTC CALCULATION (BAZETT): 396 MS
EKG R AXIS: 13 DEGREES
EKG T AXIS: 2 DEGREES
EKG VENTRICULAR RATE: 79 BPM

## 2024-11-24 PROCEDURE — 93010 ELECTROCARDIOGRAM REPORT: CPT | Performed by: INTERNAL MEDICINE

## 2024-11-25 ENCOUNTER — CARE COORDINATION (OUTPATIENT)
Dept: CARE COORDINATION | Age: 59
End: 2024-11-25

## 2024-11-25 NOTE — CARE COORDINATION
Ambulatory Care Coordination Note     11/25/2024 11:48 AM     Patient outreach attempt by this ACM today to perform hospital follow up. ACM was unable to reach the patient by telephone today;   voicemail full and unable to leave a message.      ACM: Evelyn Dockery RN     Care Summary Note: ED follow up     PCP/Specialist follow up:   Future Appointments         Provider Specialty Dept Phone    1/10/2025 11:15 AM René Oliveros PA Nephrology [phone]

## 2024-11-26 ENCOUNTER — CARE COORDINATION (OUTPATIENT)
Dept: CARE COORDINATION | Age: 59
End: 2024-11-26

## 2024-11-26 NOTE — CARE COORDINATION
Ambulatory Care Coordination Note     11/26/2024 11:54 AM     patient outreach attempt by this ACM today to perform hospital follow up. ACM was unable to reach the patient by telephone today;   voicemail full and unable to leave a message.   [employer] message sent requesting patient to contact this ACM.     Patient closed (unable to reach patient) from the High Risk Care Management program on 11/26/2024.

## 2025-03-21 ENCOUNTER — OFFICE VISIT (OUTPATIENT)
Dept: FAMILY MEDICINE CLINIC | Age: 60
End: 2025-03-21

## 2025-03-21 VITALS
WEIGHT: 280 LBS | OXYGEN SATURATION: 98 % | SYSTOLIC BLOOD PRESSURE: 121 MMHG | HEART RATE: 73 BPM | DIASTOLIC BLOOD PRESSURE: 82 MMHG | BODY MASS INDEX: 41.35 KG/M2

## 2025-03-21 DIAGNOSIS — K21.9 GASTROESOPHAGEAL REFLUX DISEASE, UNSPECIFIED WHETHER ESOPHAGITIS PRESENT: ICD-10-CM

## 2025-03-21 DIAGNOSIS — E78.2 MIXED HYPERLIPIDEMIA: ICD-10-CM

## 2025-03-21 DIAGNOSIS — I10 HYPERTENSION, ESSENTIAL: ICD-10-CM

## 2025-03-21 DIAGNOSIS — C61 PROSTATE CANCER (HCC): ICD-10-CM

## 2025-03-21 DIAGNOSIS — J30.2 SEASONAL ALLERGIES: Primary | ICD-10-CM

## 2025-03-21 DIAGNOSIS — R10.9 ABDOMINAL PAIN, UNSPECIFIED ABDOMINAL LOCATION: ICD-10-CM

## 2025-03-21 DIAGNOSIS — E79.0 ELEVATED URIC ACID IN BLOOD: ICD-10-CM

## 2025-03-21 DIAGNOSIS — N18.32 CHRONIC KIDNEY DISEASE, STAGE 3B (HCC): ICD-10-CM

## 2025-03-21 DIAGNOSIS — H10.13 ALLERGIC CONJUNCTIVITIS OF BOTH EYES: ICD-10-CM

## 2025-03-21 PROBLEM — F43.23 ADJUSTMENT DISORDER WITH MIXED ANXIETY AND DEPRESSED MOOD: Status: RESOLVED | Noted: 2022-02-21 | Resolved: 2025-03-21

## 2025-03-21 LAB
25(OH)D3 SERPL-MCNC: 50.3 NG/ML
ALBUMIN SERPL-MCNC: 4.3 G/DL (ref 3.4–5)
ALBUMIN/GLOB SERPL: 1.5 {RATIO} (ref 1.1–2.2)
ALP SERPL-CCNC: 62 U/L (ref 40–129)
ALT SERPL-CCNC: 24 U/L (ref 10–40)
ANION GAP SERPL CALCULATED.3IONS-SCNC: 10 MMOL/L (ref 3–16)
AST SERPL-CCNC: 27 U/L (ref 15–37)
BASOPHILS # BLD: 0 K/UL (ref 0–0.2)
BASOPHILS NFR BLD: 0.5 %
BILIRUB SERPL-MCNC: 1.2 MG/DL (ref 0–1)
BILIRUBIN, POC: NORMAL
BLOOD URINE, POC: NORMAL
BUN SERPL-MCNC: 9 MG/DL (ref 7–20)
CALCIUM SERPL-MCNC: 9.5 MG/DL (ref 8.3–10.6)
CHLORIDE SERPL-SCNC: 104 MMOL/L (ref 99–110)
CHOLEST SERPL-MCNC: 108 MG/DL (ref 0–199)
CLARITY, POC: CLEAR
CO2 SERPL-SCNC: 25 MMOL/L (ref 21–32)
COLOR, POC: YELLOW
CREAT SERPL-MCNC: 1.5 MG/DL (ref 0.9–1.3)
CREAT UR-MCNC: 536 MG/DL (ref 39–259)
DEPRECATED RDW RBC AUTO: 14.1 % (ref 12.4–15.4)
EOSINOPHIL # BLD: 0.2 K/UL (ref 0–0.6)
EOSINOPHIL NFR BLD: 4.9 %
FOLATE SERPL-MCNC: 37.4 NG/ML (ref 4.78–24.2)
GFR SERPLBLD CREATININE-BSD FMLA CKD-EPI: 53 ML/MIN/{1.73_M2}
GLUCOSE P FAST SERPL-MCNC: 84 MG/DL (ref 70–99)
GLUCOSE URINE, POC: NORMAL MG/DL
HCT VFR BLD AUTO: 41.6 % (ref 40.5–52.5)
HDLC SERPL-MCNC: 51 MG/DL (ref 40–60)
HGB BLD-MCNC: 14.1 G/DL (ref 13.5–17.5)
KETONES, POC: NORMAL MG/DL
LDL CHOLESTEROL: 47 MG/DL
LEUKOCYTE EST, POC: NORMAL
LYMPHOCYTES # BLD: 2.1 K/UL (ref 1–5.1)
LYMPHOCYTES NFR BLD: 44.3 %
MAGNESIUM SERPL-MCNC: 2 MG/DL (ref 1.8–2.4)
MCH RBC QN AUTO: 32.6 PG (ref 26–34)
MCHC RBC AUTO-ENTMCNC: 34 G/DL (ref 31–36)
MCV RBC AUTO: 95.9 FL (ref 80–100)
MICROALBUMIN UR DL<=1MG/L-MCNC: 3.46 MG/DL
MICROALBUMIN/CREAT UR: 6.5 MG/G (ref 0–30)
MONOCYTES # BLD: 0.4 K/UL (ref 0–1.3)
MONOCYTES NFR BLD: 8.5 %
NEUTROPHILS # BLD: 2 K/UL (ref 1.7–7.7)
NEUTROPHILS NFR BLD: 41.8 %
NITRITE, POC: NORMAL
PH, POC: 5.5
PLATELET # BLD AUTO: 198 K/UL (ref 135–450)
PMV BLD AUTO: 9.6 FL (ref 5–10.5)
POTASSIUM SERPL-SCNC: 4.1 MMOL/L (ref 3.5–5.1)
PROT SERPL-MCNC: 7.1 G/DL (ref 6.4–8.2)
PROTEIN, POC: 100 MG/DL
PSA SERPL DL<=0.01 NG/ML-MCNC: <0.02 NG/ML (ref 0–4)
PTH-INTACT SERPL-MCNC: 74.8 PG/ML (ref 14–72)
RBC # BLD AUTO: 4.34 M/UL (ref 4.2–5.9)
SODIUM SERPL-SCNC: 139 MMOL/L (ref 136–145)
SPECIFIC GRAVITY, POC: 1.03
TRIGL SERPL-MCNC: 48 MG/DL (ref 0–150)
TSH SERPL DL<=0.005 MIU/L-ACNC: 1.7 UIU/ML (ref 0.27–4.2)
URATE SERPL-MCNC: 5.4 MG/DL (ref 3.5–7.2)
UROBILINOGEN, POC: 0.2 MG/DL
VIT B12 SERPL-MCNC: 971 PG/ML (ref 211–911)
VLDLC SERPL CALC-MCNC: 10 MG/DL
WBC # BLD AUTO: 4.8 K/UL (ref 4–11)

## 2025-03-21 RX ORDER — LORATADINE 10 MG/1
10 TABLET ORAL DAILY
Qty: 90 TABLET | Refills: 1 | Status: SHIPPED | OUTPATIENT
Start: 2025-03-21

## 2025-03-21 RX ORDER — PANTOPRAZOLE SODIUM 40 MG/1
40 TABLET, DELAYED RELEASE ORAL
Qty: 30 TABLET | Refills: 1 | Status: SHIPPED | OUTPATIENT
Start: 2025-03-21 | End: 2025-03-21

## 2025-03-21 RX ORDER — FLUTICASONE PROPIONATE 50 MCG
2 SPRAY, SUSPENSION (ML) NASAL DAILY
Qty: 16 G | Refills: 2 | Status: SHIPPED | OUTPATIENT
Start: 2025-03-21

## 2025-03-21 RX ORDER — PANTOPRAZOLE SODIUM 40 MG/1
40 TABLET, DELAYED RELEASE ORAL
Qty: 90 TABLET | Refills: 0 | Status: SHIPPED | OUTPATIENT
Start: 2025-03-21

## 2025-03-21 RX ORDER — OLOPATADINE HYDROCHLORIDE 1 MG/ML
1 SOLUTION/ DROPS OPHTHALMIC 2 TIMES DAILY
Qty: 5 ML | Refills: 2 | Status: SHIPPED | OUTPATIENT
Start: 2025-03-21

## 2025-03-21 SDOH — ECONOMIC STABILITY: FOOD INSECURITY: WITHIN THE PAST 12 MONTHS, YOU WORRIED THAT YOUR FOOD WOULD RUN OUT BEFORE YOU GOT MONEY TO BUY MORE.: NEVER TRUE

## 2025-03-21 SDOH — ECONOMIC STABILITY: FOOD INSECURITY: WITHIN THE PAST 12 MONTHS, THE FOOD YOU BOUGHT JUST DIDN'T LAST AND YOU DIDN'T HAVE MONEY TO GET MORE.: NEVER TRUE

## 2025-03-21 ASSESSMENT — PATIENT HEALTH QUESTIONNAIRE - PHQ9
SUM OF ALL RESPONSES TO PHQ QUESTIONS 1-9: 0
7. TROUBLE CONCENTRATING ON THINGS, SUCH AS READING THE NEWSPAPER OR WATCHING TELEVISION: NOT AT ALL
SUM OF ALL RESPONSES TO PHQ QUESTIONS 1-9: 0
SUM OF ALL RESPONSES TO PHQ QUESTIONS 1-9: 0
3. TROUBLE FALLING OR STAYING ASLEEP: NOT AT ALL
9. THOUGHTS THAT YOU WOULD BE BETTER OFF DEAD, OR OF HURTING YOURSELF: NOT AT ALL
4. FEELING TIRED OR HAVING LITTLE ENERGY: NOT AT ALL
8. MOVING OR SPEAKING SO SLOWLY THAT OTHER PEOPLE COULD HAVE NOTICED. OR THE OPPOSITE, BEING SO FIGETY OR RESTLESS THAT YOU HAVE BEEN MOVING AROUND A LOT MORE THAN USUAL: NOT AT ALL
10. IF YOU CHECKED OFF ANY PROBLEMS, HOW DIFFICULT HAVE THESE PROBLEMS MADE IT FOR YOU TO DO YOUR WORK, TAKE CARE OF THINGS AT HOME, OR GET ALONG WITH OTHER PEOPLE: NOT DIFFICULT AT ALL
5. POOR APPETITE OR OVEREATING: NOT AT ALL
6. FEELING BAD ABOUT YOURSELF - OR THAT YOU ARE A FAILURE OR HAVE LET YOURSELF OR YOUR FAMILY DOWN: NOT AT ALL
1. LITTLE INTEREST OR PLEASURE IN DOING THINGS: NOT AT ALL
2. FEELING DOWN, DEPRESSED OR HOPELESS: NOT AT ALL
SUM OF ALL RESPONSES TO PHQ QUESTIONS 1-9: 0

## 2025-03-21 NOTE — TELEPHONE ENCOUNTER
Medication:   Requested Prescriptions     Pending Prescriptions Disp Refills    pantoprazole (PROTONIX) 40 MG tablet [Pharmacy Med Name: PANTOPRAZOLE 40MG TABLETS] 90 tablet      Sig: TAKE 1 TABLET BY MOUTH EVERY MORNING BEFORE BREAKFAST        Last Filled:  03/21/2025    Patient Phone Number: [phone] (home)     Last appt: 3/21/2025   Next appt: Visit date not found    Last OARRS:        No data to display

## 2025-03-21 NOTE — PROGRESS NOTES
Keyon Rosales is a 59 y.o. male.    HPI:  Here with complaint of allergies/runny nose and itchy eyes  Recommend getting back on allergy meds    Also complaining of epigastric abdominal discomfort, history of reflux  Has cut back on his use of NSAIDs and caffeine  Will try PPI    Due for labs and urine micral screening  Blood pressure good today  Taking meds  Meds, vitamins and allergies reviewed with pt    Labs from February 2025 reviewed with patient in detail, look improved overall    Weight loss noted    Wt Readings from Last 3 Encounters:   03/21/25 127 kg (280 lb)   02/07/25 132 kg (291 lb)   05/10/24 131.1 kg (289 lb)       REVIEW OF SYSTEMS:   CONSTITUTIONAL: See history of present illness,   Weight noted   HEENT: Itchy allergic eyes, no URI symptoms, runny nose from allergies  RESPIRATORY: No new SOB, PND, orthopnea or cough.   CARDIOVASCULAR: no CP, palpitations or SOB with exertion  GI: No nausea, vomiting, diarrhea, constipation, abdominal pain or changes in bowel habits.   : No urinary frequency, urgency, incontinence hematuria or dysuria.   SKIN: No cyanosis or skin lesions.   MUSCULOSKELETAL: No new muscle or joint pain.   NEUROLOGICAL: No syncope or TIA-like symptoms.   PSYCHIATRIC: No anxiety, insomnia or depression     Allergies   Allergen Reactions    Aleve [Naproxen Sodium] Rash     \"pt takes aspirin at home\"    Norvasc [Amlodipine Besylate] Other (See Comments)     Palpitations/chest pressure       Prior to Visit Medications    Medication Sig Taking? Authorizing Provider   fluticasone (FLONASE) 50 MCG/ACT nasal spray 2 sprays by Each Nostril route daily Yes Rosa Maria Eduardo MD   loratadine (CLARITIN) 10 MG tablet Take 1 tablet by mouth daily Yes Rosa Maria Eduardo MD   olopatadine (PATANOL) 0.1 % ophthalmic solution Place 1 drop into both eyes 2 times daily Yes Rosa Maria Eduardo MD   folic acid (FOLVITE) 1 MG tablet TAKE 1 TABLET BY MOUTH DAILY Yes Rosa Maria Eduardo MD   vitamin D (ERGOCALCIFEROL)

## 2025-03-22 ENCOUNTER — RESULTS FOLLOW-UP (OUTPATIENT)
Dept: FAMILY MEDICINE CLINIC | Age: 60
End: 2025-03-22

## 2025-03-22 PROBLEM — H10.13 ALLERGIC CONJUNCTIVITIS OF BOTH EYES: Status: ACTIVE | Noted: 2025-03-22

## 2025-03-22 PROBLEM — J30.2 SEASONAL ALLERGIES: Status: ACTIVE | Noted: 2025-03-22

## 2025-03-22 LAB
EST. AVERAGE GLUCOSE BLD GHB EST-MCNC: 99.7 MG/DL
HBA1C MFR BLD: 5.1 %

## 2025-03-31 RX ORDER — ASPIRIN 81 MG/1
81 TABLET, CHEWABLE ORAL
Qty: 90 TABLET | Refills: 3 | Status: SHIPPED | OUTPATIENT
Start: 2025-03-31

## 2025-06-23 RX ORDER — PANTOPRAZOLE SODIUM 40 MG/1
40 TABLET, DELAYED RELEASE ORAL
Qty: 90 TABLET | Refills: 1 | Status: SHIPPED | OUTPATIENT
Start: 2025-06-23

## 2025-06-23 RX ORDER — OLMESARTAN MEDOXOMIL 20 MG/1
20 TABLET ORAL DAILY
Qty: 90 TABLET | Refills: 3 | Status: SHIPPED | OUTPATIENT
Start: 2025-06-23

## 2025-06-23 NOTE — TELEPHONE ENCOUNTER
Medication:   Requested Prescriptions     Pending Prescriptions Disp Refills    pantoprazole (PROTONIX) 40 MG tablet [Pharmacy Med Name: PANTOPRAZOLE 40MG TABLETS] 90 tablet 0     Sig: TAKE 1 TABLET BY MOUTH EVERY MORNING BEFORE BREAKFAST    olmesartan (BENICAR) 20 MG tablet [Pharmacy Med Name: OLMESARTAN MEDOXOMIL 20MG TABLETS] 90 tablet 3     Sig: TAKE 1 TABLET BY MOUTH DAILY       Last Filled:  03/21/2025 04/29/2024    Patient Phone Number: [phone] (home)     Last appt: 3/21/2025   Next appt: Visit date not found    Lab Results   Component Value Date     03/21/2025    K 4.1 03/21/2025     03/21/2025    CO2 25 03/21/2025    BUN 9 03/21/2025    CREATININE 1.5 (H) 03/21/2025    GLUCOSE 77 02/03/2025    CALCIUM 9.5 03/21/2025    BILITOT 1.2 (H) 03/21/2025    ALKPHOS 62 03/21/2025    AST 27 03/21/2025    ALT 24 03/21/2025    LABGLOM 53 (A) 03/21/2025    GFRAA 59 (A) 04/09/2022    AGRATIO 1.5 03/21/2025    GLOB 2.4 02/21/2020

## 2025-06-30 RX ORDER — FOLIC ACID 1 MG/1
1 TABLET ORAL DAILY
Qty: 90 TABLET | Refills: 1 | Status: SHIPPED | OUTPATIENT
Start: 2025-06-30

## 2025-06-30 NOTE — TELEPHONE ENCOUNTER
Medication:   Requested Prescriptions     Pending Prescriptions Disp Refills    folic acid (FOLVITE) 1 MG tablet [Pharmacy Med Name: FOLIC ACID 1MG TABLETS] 90 tablet 1     Sig: TAKE 1 TABLET BY MOUTH DAILY        Last Filled:      Patient Phone Number: [phone] (home)     Last appt: 3/21/2025   Next appt: Visit date not found    Last OARRS:        No data to display

## 2025-07-03 RX ORDER — MULTIVITAMIN WITH IRON
500 TABLET ORAL DAILY
Qty: 90 TABLET | Refills: 1 | Status: SHIPPED | OUTPATIENT
Start: 2025-07-03 | End: 2026-07-03